# Patient Record
Sex: FEMALE | Race: WHITE | Employment: OTHER | URBAN - NONMETROPOLITAN AREA
[De-identification: names, ages, dates, MRNs, and addresses within clinical notes are randomized per-mention and may not be internally consistent; named-entity substitution may affect disease eponyms.]

---

## 2017-03-09 ENCOUNTER — TELEPHONE (OUTPATIENT)
Dept: FAMILY MEDICINE CLINIC | Age: 67
End: 2017-03-09

## 2017-03-20 ENCOUNTER — TELEPHONE (OUTPATIENT)
Dept: FAMILY MEDICINE CLINIC | Age: 67
End: 2017-03-20

## 2017-03-20 DIAGNOSIS — M75.121 COMPLETE TEAR OF RIGHT ROTATOR CUFF: Primary | ICD-10-CM

## 2017-04-26 ENCOUNTER — NURSE ONLY (OUTPATIENT)
Dept: FAMILY MEDICINE CLINIC | Age: 67
End: 2017-04-26

## 2017-04-26 DIAGNOSIS — R30.0 DYSURIA: Primary | ICD-10-CM

## 2017-04-26 DIAGNOSIS — R35.0 URINARY FREQUENCY: ICD-10-CM

## 2017-04-26 LAB
BILIRUBIN, POC: NEGATIVE
BLOOD URINE, POC: NEGATIVE
CLARITY, POC: CLEAR
COLOR, POC: YELLOW
GLUCOSE URINE, POC: NEGATIVE
KETONES, POC: NEGATIVE
LEUKOCYTE EST, POC: NEGATIVE
NITRITE, POC: NEGATIVE
PH, POC: 5.5
PROTEIN, POC: NEGATIVE
SPECIFIC GRAVITY, POC: 1.03
UROBILINOGEN, POC: 0.2

## 2017-04-26 PROCEDURE — 81003 URINALYSIS AUTO W/O SCOPE: CPT | Performed by: NURSE PRACTITIONER

## 2017-05-11 ENCOUNTER — OFFICE VISIT (OUTPATIENT)
Dept: ONCOLOGY | Age: 67
End: 2017-05-11

## 2017-05-11 VITALS
HEIGHT: 63 IN | WEIGHT: 155 LBS | SYSTOLIC BLOOD PRESSURE: 142 MMHG | RESPIRATION RATE: 18 BRPM | BODY MASS INDEX: 27.46 KG/M2 | TEMPERATURE: 97.7 F | OXYGEN SATURATION: 97 % | DIASTOLIC BLOOD PRESSURE: 71 MMHG | HEART RATE: 73 BPM

## 2017-05-11 DIAGNOSIS — C50.912 BREAST CANCER METASTASIZED TO AXILLARY LYMPH NODE, LEFT (HCC): ICD-10-CM

## 2017-05-11 DIAGNOSIS — C77.3 BREAST CANCER METASTASIZED TO AXILLARY LYMPH NODE, LEFT (HCC): ICD-10-CM

## 2017-05-11 PROCEDURE — G8427 DOCREV CUR MEDS BY ELIG CLIN: HCPCS | Performed by: INTERNAL MEDICINE

## 2017-05-11 PROCEDURE — 99214 OFFICE O/P EST MOD 30 MIN: CPT | Performed by: INTERNAL MEDICINE

## 2017-05-11 PROCEDURE — 3014F SCREEN MAMMO DOC REV: CPT | Performed by: INTERNAL MEDICINE

## 2017-05-11 PROCEDURE — 3017F COLORECTAL CA SCREEN DOC REV: CPT | Performed by: INTERNAL MEDICINE

## 2017-05-11 PROCEDURE — G8420 CALC BMI NORM PARAMETERS: HCPCS | Performed by: INTERNAL MEDICINE

## 2017-05-11 PROCEDURE — 4040F PNEUMOC VAC/ADMIN/RCVD: CPT | Performed by: INTERNAL MEDICINE

## 2017-05-11 PROCEDURE — 1123F ACP DISCUSS/DSCN MKR DOCD: CPT | Performed by: INTERNAL MEDICINE

## 2017-05-11 PROCEDURE — 1036F TOBACCO NON-USER: CPT | Performed by: INTERNAL MEDICINE

## 2017-05-11 PROCEDURE — 1090F PRES/ABSN URINE INCON ASSESS: CPT | Performed by: INTERNAL MEDICINE

## 2017-05-11 PROCEDURE — G8400 PT W/DXA NO RESULTS DOC: HCPCS | Performed by: INTERNAL MEDICINE

## 2017-05-11 RX ORDER — ANASTROZOLE 1 MG/1
TABLET ORAL
Qty: 90 TABLET | Refills: 3 | Status: SHIPPED | OUTPATIENT
Start: 2017-05-11 | End: 2018-07-16 | Stop reason: SDUPTHER

## 2017-06-12 ENCOUNTER — OFFICE VISIT (OUTPATIENT)
Dept: CARDIOLOGY | Age: 67
End: 2017-06-12

## 2017-06-12 VITALS
HEIGHT: 63 IN | SYSTOLIC BLOOD PRESSURE: 144 MMHG | BODY MASS INDEX: 27 KG/M2 | DIASTOLIC BLOOD PRESSURE: 60 MMHG | WEIGHT: 152.4 LBS | HEART RATE: 71 BPM

## 2017-06-12 DIAGNOSIS — R06.02 SOB (SHORTNESS OF BREATH) ON EXERTION: ICD-10-CM

## 2017-06-12 DIAGNOSIS — I48.0 PAF (PAROXYSMAL ATRIAL FIBRILLATION) (HCC): ICD-10-CM

## 2017-06-12 DIAGNOSIS — I31.39 PERICARDIAL EFFUSION: ICD-10-CM

## 2017-06-12 DIAGNOSIS — R94.31 ABNORMAL EKG: ICD-10-CM

## 2017-06-12 DIAGNOSIS — Z01.818 PRE-OP EVALUATION: Primary | ICD-10-CM

## 2017-06-12 DIAGNOSIS — R60.0 BILATERAL LEG EDEMA: ICD-10-CM

## 2017-06-12 PROCEDURE — G8427 DOCREV CUR MEDS BY ELIG CLIN: HCPCS | Performed by: INTERNAL MEDICINE

## 2017-06-12 PROCEDURE — 1123F ACP DISCUSS/DSCN MKR DOCD: CPT | Performed by: INTERNAL MEDICINE

## 2017-06-12 PROCEDURE — 1090F PRES/ABSN URINE INCON ASSESS: CPT | Performed by: INTERNAL MEDICINE

## 2017-06-12 PROCEDURE — 3017F COLORECTAL CA SCREEN DOC REV: CPT | Performed by: INTERNAL MEDICINE

## 2017-06-12 PROCEDURE — G8419 CALC BMI OUT NRM PARAM NOF/U: HCPCS | Performed by: INTERNAL MEDICINE

## 2017-06-12 PROCEDURE — 3014F SCREEN MAMMO DOC REV: CPT | Performed by: INTERNAL MEDICINE

## 2017-06-12 PROCEDURE — 93000 ELECTROCARDIOGRAM COMPLETE: CPT | Performed by: INTERNAL MEDICINE

## 2017-06-12 PROCEDURE — 99214 OFFICE O/P EST MOD 30 MIN: CPT | Performed by: INTERNAL MEDICINE

## 2017-06-12 PROCEDURE — 4040F PNEUMOC VAC/ADMIN/RCVD: CPT | Performed by: INTERNAL MEDICINE

## 2017-06-12 PROCEDURE — 1036F TOBACCO NON-USER: CPT | Performed by: INTERNAL MEDICINE

## 2017-06-12 PROCEDURE — G8400 PT W/DXA NO RESULTS DOC: HCPCS | Performed by: INTERNAL MEDICINE

## 2017-06-13 RX ORDER — DILTIAZEM HYDROCHLORIDE 120 MG/1
120 CAPSULE, EXTENDED RELEASE ORAL DAILY
Qty: 180 CAPSULE | Refills: 1 | Status: SHIPPED | OUTPATIENT
Start: 2017-06-13 | End: 2017-06-13 | Stop reason: SDUPTHER

## 2017-06-13 RX ORDER — FLECAINIDE ACETATE 50 MG/1
TABLET ORAL
Qty: 180 TABLET | Refills: 1 | Status: SHIPPED | OUTPATIENT
Start: 2017-06-13 | End: 2017-06-13 | Stop reason: SDUPTHER

## 2017-06-21 RX ORDER — FLECAINIDE ACETATE 50 MG/1
TABLET ORAL
Qty: 180 TABLET | Refills: 1 | Status: SHIPPED | OUTPATIENT
Start: 2017-06-21 | End: 2017-12-24 | Stop reason: SDUPTHER

## 2017-06-21 RX ORDER — DILTIAZEM HYDROCHLORIDE 120 MG/1
120 CAPSULE, EXTENDED RELEASE ORAL DAILY
Qty: 180 CAPSULE | Refills: 1 | Status: SHIPPED | OUTPATIENT
Start: 2017-06-21 | End: 2018-06-11 | Stop reason: SDUPTHER

## 2017-06-27 ENCOUNTER — TELEPHONE (OUTPATIENT)
Dept: CARDIOLOGY | Age: 67
End: 2017-06-27

## 2017-07-03 ENCOUNTER — OFFICE VISIT (OUTPATIENT)
Dept: CARDIOLOGY | Age: 67
End: 2017-07-03

## 2017-07-03 VITALS
BODY MASS INDEX: 27.29 KG/M2 | DIASTOLIC BLOOD PRESSURE: 64 MMHG | HEART RATE: 84 BPM | HEIGHT: 63 IN | WEIGHT: 154 LBS | SYSTOLIC BLOOD PRESSURE: 130 MMHG

## 2017-07-03 DIAGNOSIS — I48.0 PAF (PAROXYSMAL ATRIAL FIBRILLATION) (HCC): ICD-10-CM

## 2017-07-03 DIAGNOSIS — R94.31 ABNORMAL EKG: ICD-10-CM

## 2017-07-03 DIAGNOSIS — R06.02 SOB (SHORTNESS OF BREATH) ON EXERTION: Primary | ICD-10-CM

## 2017-07-03 DIAGNOSIS — R00.2 INTERMITTENT PALPITATIONS: ICD-10-CM

## 2017-07-03 DIAGNOSIS — R53.1 GENERALIZED WEAKNESS: ICD-10-CM

## 2017-07-03 DIAGNOSIS — Z01.818 PRE-OP EVALUATION: ICD-10-CM

## 2017-07-03 PROCEDURE — 3017F COLORECTAL CA SCREEN DOC REV: CPT | Performed by: INTERNAL MEDICINE

## 2017-07-03 PROCEDURE — 99213 OFFICE O/P EST LOW 20 MIN: CPT | Performed by: INTERNAL MEDICINE

## 2017-07-03 PROCEDURE — 1036F TOBACCO NON-USER: CPT | Performed by: INTERNAL MEDICINE

## 2017-07-03 PROCEDURE — G8400 PT W/DXA NO RESULTS DOC: HCPCS | Performed by: INTERNAL MEDICINE

## 2017-07-03 PROCEDURE — 3014F SCREEN MAMMO DOC REV: CPT | Performed by: INTERNAL MEDICINE

## 2017-07-03 PROCEDURE — 1123F ACP DISCUSS/DSCN MKR DOCD: CPT | Performed by: INTERNAL MEDICINE

## 2017-07-03 PROCEDURE — G8427 DOCREV CUR MEDS BY ELIG CLIN: HCPCS | Performed by: INTERNAL MEDICINE

## 2017-07-03 PROCEDURE — G8419 CALC BMI OUT NRM PARAM NOF/U: HCPCS | Performed by: INTERNAL MEDICINE

## 2017-07-03 PROCEDURE — 4040F PNEUMOC VAC/ADMIN/RCVD: CPT | Performed by: INTERNAL MEDICINE

## 2017-07-03 PROCEDURE — 1090F PRES/ABSN URINE INCON ASSESS: CPT | Performed by: INTERNAL MEDICINE

## 2017-07-14 RX ORDER — SODIUM CHLORIDE 0.9 % (FLUSH) 0.9 %
10 SYRINGE (ML) INJECTION PRN
Status: CANCELLED | OUTPATIENT
Start: 2017-07-14

## 2017-07-14 RX ORDER — SODIUM CHLORIDE 9 MG/ML
INJECTION, SOLUTION INTRAVENOUS CONTINUOUS
Status: CANCELLED | OUTPATIENT
Start: 2017-07-14

## 2017-07-14 RX ORDER — ALPRAZOLAM 0.5 MG/1
0.5 TABLET ORAL
Status: CANCELLED | OUTPATIENT
Start: 2017-07-14 | End: 2017-07-14

## 2017-07-14 RX ORDER — SODIUM CHLORIDE 0.9 % (FLUSH) 0.9 %
10 SYRINGE (ML) INJECTION EVERY 12 HOURS SCHEDULED
Status: CANCELLED | OUTPATIENT
Start: 2017-07-14

## 2017-07-17 RX ORDER — SODIUM CHLORIDE 9 MG/ML
INJECTION, SOLUTION INTRAVENOUS CONTINUOUS
Status: CANCELLED | OUTPATIENT
Start: 2017-07-17

## 2017-07-17 RX ORDER — ALPRAZOLAM 0.5 MG/1
0.5 TABLET ORAL
Status: CANCELLED | OUTPATIENT
Start: 2017-07-17 | End: 2017-07-17

## 2017-07-17 RX ORDER — SODIUM CHLORIDE 0.9 % (FLUSH) 0.9 %
10 SYRINGE (ML) INJECTION EVERY 12 HOURS SCHEDULED
Status: CANCELLED | OUTPATIENT
Start: 2017-07-17

## 2017-07-17 RX ORDER — SODIUM CHLORIDE 0.9 % (FLUSH) 0.9 %
10 SYRINGE (ML) INJECTION PRN
Status: CANCELLED | OUTPATIENT
Start: 2017-07-17

## 2017-07-18 ENCOUNTER — HOSPITAL ENCOUNTER (OUTPATIENT)
Dept: CARDIAC CATH/INVASIVE PROCEDURES | Age: 67
Discharge: HOME | End: 2017-07-18
Payer: MEDICARE

## 2017-07-18 ENCOUNTER — APPOINTMENT (OUTPATIENT)
Dept: INPATIENT UNIT | Age: 67
End: 2017-07-18
Payer: MEDICARE

## 2017-07-18 ENCOUNTER — HOSPITAL ENCOUNTER (OUTPATIENT)
Dept: INPATIENT UNIT | Age: 67
Discharge: HOME | End: 2017-07-18
Payer: MEDICARE

## 2017-07-18 VITALS
HEART RATE: 72 BPM | WEIGHT: 154 LBS | OXYGEN SATURATION: 96 % | HEIGHT: 63 IN | DIASTOLIC BLOOD PRESSURE: 55 MMHG | BODY MASS INDEX: 27.29 KG/M2 | SYSTOLIC BLOOD PRESSURE: 140 MMHG | TEMPERATURE: 97.7 F | RESPIRATION RATE: 20 BRPM

## 2017-07-18 DIAGNOSIS — Z98.890 S/P CARDIAC CATH: ICD-10-CM

## 2017-07-18 LAB
ANION GAP SERPL CALCULATED.3IONS-SCNC: 10 MEQ/L (ref 8–16)
BUN BLDV-MCNC: 14 MG/DL (ref 7–22)
CALCIUM SERPL-MCNC: 9.4 MG/DL (ref 8.5–10.5)
CHLORIDE BLD-SCNC: 103 MEQ/L (ref 98–111)
CO2: 28 MEQ/L (ref 23–33)
CREAT SERPL-MCNC: 0.6 MG/DL (ref 0.4–1.2)
GFR SERPL CREATININE-BSD FRML MDRD: > 90 ML/MIN/1.73M2
GLUCOSE BLD-MCNC: 86 MG/DL (ref 70–108)
HCT VFR BLD CALC: 38.1 % (ref 37–47)
HEMOGLOBIN: 13.2 GM/DL (ref 12–16)
MCH RBC QN AUTO: 30.9 PG (ref 27–31)
MCHC RBC AUTO-ENTMCNC: 34.5 GM/DL (ref 33–37)
MCV RBC AUTO: 89.5 FL (ref 81–99)
PDW BLD-RTO: 14 % (ref 11.5–14.5)
PLATELET # BLD: 236 THOU/MM3 (ref 130–400)
PMV BLD AUTO: 8.3 MCM (ref 7.4–10.4)
POTASSIUM SERPL-SCNC: 4.2 MEQ/L (ref 3.5–5.2)
RBC # BLD: 4.26 MILL/MM3 (ref 4.2–5.4)
SODIUM BLD-SCNC: 141 MEQ/L (ref 135–145)
WBC # BLD: 6.9 THOU/MM3 (ref 4.8–10.8)

## 2017-07-18 PROCEDURE — C1894 INTRO/SHEATH, NON-LASER: HCPCS

## 2017-07-18 PROCEDURE — 93458 L HRT ARTERY/VENTRICLE ANGIO: CPT | Performed by: INTERNAL MEDICINE

## 2017-07-18 PROCEDURE — C1725 CATH, TRANSLUMIN NON-LASER: HCPCS

## 2017-07-18 PROCEDURE — 36415 COLL VENOUS BLD VENIPUNCTURE: CPT

## 2017-07-18 PROCEDURE — 2500000003 HC RX 250 WO HCPCS

## 2017-07-18 PROCEDURE — 2780000010 HC IMPLANT OTHER

## 2017-07-18 PROCEDURE — 6360000002 HC RX W HCPCS

## 2017-07-18 PROCEDURE — C1769 GUIDE WIRE: HCPCS

## 2017-07-18 PROCEDURE — 80048 BASIC METABOLIC PNL TOTAL CA: CPT

## 2017-07-18 PROCEDURE — 2580000003 HC RX 258: Performed by: PHYSICIAN ASSISTANT

## 2017-07-18 PROCEDURE — 85027 COMPLETE CBC AUTOMATED: CPT

## 2017-07-18 PROCEDURE — 93005 ELECTROCARDIOGRAM TRACING: CPT

## 2017-07-18 RX ORDER — ACETAMINOPHEN 325 MG/1
650 TABLET ORAL EVERY 4 HOURS PRN
Status: CANCELLED | OUTPATIENT
Start: 2017-07-18

## 2017-07-18 RX ORDER — ACETAMINOPHEN 325 MG/1
650 TABLET ORAL EVERY 4 HOURS PRN
Status: DISCONTINUED | OUTPATIENT
Start: 2017-07-18 | End: 2017-07-19 | Stop reason: HOSPADM

## 2017-07-18 RX ORDER — SODIUM CHLORIDE 9 MG/ML
INJECTION, SOLUTION INTRAVENOUS CONTINUOUS
Status: DISCONTINUED | OUTPATIENT
Start: 2017-07-18 | End: 2017-07-19 | Stop reason: HOSPADM

## 2017-07-18 RX ORDER — SODIUM CHLORIDE 0.9 % (FLUSH) 0.9 %
10 SYRINGE (ML) INJECTION EVERY 12 HOURS SCHEDULED
Status: DISCONTINUED | OUTPATIENT
Start: 2017-07-18 | End: 2017-07-18 | Stop reason: SDUPTHER

## 2017-07-18 RX ORDER — SODIUM CHLORIDE 0.9 % (FLUSH) 0.9 %
10 SYRINGE (ML) INJECTION PRN
Status: CANCELLED | OUTPATIENT
Start: 2017-07-18

## 2017-07-18 RX ORDER — ONDANSETRON 2 MG/ML
4 INJECTION INTRAMUSCULAR; INTRAVENOUS EVERY 6 HOURS PRN
Status: DISCONTINUED | OUTPATIENT
Start: 2017-07-18 | End: 2017-07-19 | Stop reason: HOSPADM

## 2017-07-18 RX ORDER — ALPRAZOLAM 0.5 MG/1
0.5 TABLET ORAL
Status: ACTIVE | OUTPATIENT
Start: 2017-07-18 | End: 2017-07-18

## 2017-07-18 RX ORDER — SODIUM CHLORIDE 0.9 % (FLUSH) 0.9 %
10 SYRINGE (ML) INJECTION PRN
Status: DISCONTINUED | OUTPATIENT
Start: 2017-07-18 | End: 2017-07-18 | Stop reason: SDUPTHER

## 2017-07-18 RX ORDER — ONDANSETRON 2 MG/ML
4 INJECTION INTRAMUSCULAR; INTRAVENOUS EVERY 6 HOURS PRN
Status: CANCELLED | OUTPATIENT
Start: 2017-07-18

## 2017-07-18 RX ORDER — SODIUM CHLORIDE 0.9 % (FLUSH) 0.9 %
10 SYRINGE (ML) INJECTION EVERY 12 HOURS SCHEDULED
Status: CANCELLED | OUTPATIENT
Start: 2017-07-18

## 2017-07-18 RX ORDER — SODIUM CHLORIDE 0.9 % (FLUSH) 0.9 %
10 SYRINGE (ML) INJECTION EVERY 12 HOURS SCHEDULED
Status: DISCONTINUED | OUTPATIENT
Start: 2017-07-18 | End: 2017-07-19 | Stop reason: HOSPADM

## 2017-07-18 RX ADMIN — SODIUM CHLORIDE: 9 INJECTION, SOLUTION INTRAVENOUS at 14:22

## 2017-07-19 LAB
EKG ATRIAL RATE: 66 BPM
EKG P AXIS: 39 DEGREES
EKG P-R INTERVAL: 190 MS
EKG Q-T INTERVAL: 414 MS
EKG QRS DURATION: 86 MS
EKG QTC CALCULATION (BAZETT): 434 MS
EKG R AXIS: 71 DEGREES
EKG T AXIS: 73 DEGREES
EKG VENTRICULAR RATE: 66 BPM

## 2017-07-26 ENCOUNTER — TELEPHONE (OUTPATIENT)
Dept: CARDIOLOGY CLINIC | Age: 67
End: 2017-07-26

## 2017-08-28 ENCOUNTER — OFFICE VISIT (OUTPATIENT)
Dept: CARDIOLOGY CLINIC | Age: 67
End: 2017-08-28
Payer: MEDICARE

## 2017-08-28 VITALS
DIASTOLIC BLOOD PRESSURE: 82 MMHG | SYSTOLIC BLOOD PRESSURE: 137 MMHG | BODY MASS INDEX: 26.72 KG/M2 | WEIGHT: 150.8 LBS | HEIGHT: 63 IN | HEART RATE: 78 BPM

## 2017-08-28 DIAGNOSIS — Z98.890 S/P CARDIAC CATH: Primary | ICD-10-CM

## 2017-08-28 DIAGNOSIS — R00.2 INTERMITTENT PALPITATIONS: ICD-10-CM

## 2017-08-28 DIAGNOSIS — I48.0 PAF (PAROXYSMAL ATRIAL FIBRILLATION) (HCC): ICD-10-CM

## 2017-08-28 PROCEDURE — 1123F ACP DISCUSS/DSCN MKR DOCD: CPT | Performed by: INTERNAL MEDICINE

## 2017-08-28 PROCEDURE — 1090F PRES/ABSN URINE INCON ASSESS: CPT | Performed by: INTERNAL MEDICINE

## 2017-08-28 PROCEDURE — G8419 CALC BMI OUT NRM PARAM NOF/U: HCPCS | Performed by: INTERNAL MEDICINE

## 2017-08-28 PROCEDURE — 99213 OFFICE O/P EST LOW 20 MIN: CPT | Performed by: INTERNAL MEDICINE

## 2017-08-28 PROCEDURE — 1036F TOBACCO NON-USER: CPT | Performed by: INTERNAL MEDICINE

## 2017-08-28 PROCEDURE — G8428 CUR MEDS NOT DOCUMENT: HCPCS | Performed by: INTERNAL MEDICINE

## 2017-08-28 PROCEDURE — 3017F COLORECTAL CA SCREEN DOC REV: CPT | Performed by: INTERNAL MEDICINE

## 2017-08-28 PROCEDURE — 4040F PNEUMOC VAC/ADMIN/RCVD: CPT | Performed by: INTERNAL MEDICINE

## 2017-08-28 PROCEDURE — 3014F SCREEN MAMMO DOC REV: CPT | Performed by: INTERNAL MEDICINE

## 2017-08-28 PROCEDURE — G8400 PT W/DXA NO RESULTS DOC: HCPCS | Performed by: INTERNAL MEDICINE

## 2017-08-28 RX ORDER — OXYCODONE HYDROCHLORIDE AND ACETAMINOPHEN 5; 325 MG/1; MG/1
1 TABLET ORAL EVERY 4 HOURS PRN
COMMUNITY
End: 2017-10-02

## 2017-09-26 ENCOUNTER — TELEPHONE (OUTPATIENT)
Dept: ADMINISTRATIVE | Age: 67
End: 2017-09-26

## 2017-10-02 ENCOUNTER — OFFICE VISIT (OUTPATIENT)
Dept: SURGERY | Age: 67
End: 2017-10-02
Payer: MEDICARE

## 2017-10-02 VITALS
WEIGHT: 153.4 LBS | DIASTOLIC BLOOD PRESSURE: 64 MMHG | HEART RATE: 100 BPM | OXYGEN SATURATION: 96 % | HEIGHT: 63 IN | BODY MASS INDEX: 27.18 KG/M2 | RESPIRATION RATE: 18 BRPM | SYSTOLIC BLOOD PRESSURE: 120 MMHG | TEMPERATURE: 97.6 F

## 2017-10-02 DIAGNOSIS — C50.512 MALIGNANT NEOPLASM OF LOWER-OUTER QUADRANT OF LEFT FEMALE BREAST (HCC): Primary | ICD-10-CM

## 2017-10-02 DIAGNOSIS — M85.80 OSTEOPENIA, UNSPECIFIED LOCATION: ICD-10-CM

## 2017-10-02 PROCEDURE — G8484 FLU IMMUNIZE NO ADMIN: HCPCS | Performed by: SURGERY

## 2017-10-02 PROCEDURE — 4040F PNEUMOC VAC/ADMIN/RCVD: CPT | Performed by: SURGERY

## 2017-10-02 PROCEDURE — G8427 DOCREV CUR MEDS BY ELIG CLIN: HCPCS | Performed by: SURGERY

## 2017-10-02 PROCEDURE — 1090F PRES/ABSN URINE INCON ASSESS: CPT | Performed by: SURGERY

## 2017-10-02 PROCEDURE — 99214 OFFICE O/P EST MOD 30 MIN: CPT | Performed by: SURGERY

## 2017-10-02 PROCEDURE — 3014F SCREEN MAMMO DOC REV: CPT | Performed by: SURGERY

## 2017-10-02 PROCEDURE — G8400 PT W/DXA NO RESULTS DOC: HCPCS | Performed by: SURGERY

## 2017-10-02 PROCEDURE — 1123F ACP DISCUSS/DSCN MKR DOCD: CPT | Performed by: SURGERY

## 2017-10-02 PROCEDURE — 1036F TOBACCO NON-USER: CPT | Performed by: SURGERY

## 2017-10-02 PROCEDURE — 3017F COLORECTAL CA SCREEN DOC REV: CPT | Performed by: SURGERY

## 2017-10-02 PROCEDURE — G8417 CALC BMI ABV UP PARAM F/U: HCPCS | Performed by: SURGERY

## 2017-10-02 ASSESSMENT — ENCOUNTER SYMPTOMS
ABDOMINAL PAIN: 0
COLOR CHANGE: 0
COUGH: 0
ABDOMINAL DISTENTION: 0
BLOOD IN STOOL: 0
TROUBLE SWALLOWING: 0
CONSTIPATION: 0
NAUSEA: 0

## 2017-10-02 NOTE — PROGRESS NOTES
Lucia Polk MD   General Surgery  Follow-up Patient Evaluation in Office  Pt Name: Katerine Scott  Date of Birth 1950   Today's Date: 10/2/2017  Medical Record Number: [id number]  Referring Provider: No ref. provider found  Primary Care Provider: Devan Aguiar NP  Chief Complaint:  Chief Complaint   Patient presents with    1 Year Follow Up     left breast cancer-mammogram 4/17/17       ASSESSMENT      1. Malignant neoplasm of lower-outer quadrant of left female breast ([address]    2. Osteopenia, unspecified location         PLANS      1. Continue Arimidex 5 years  2. Medical oncology follow-up with Dr. Debora Cid  3. Encourage monthly self breast examinations and yearly breast imaging  4. Breast survivorship plan discussed with patient  5. Follow-up surgical clinic in 1 year  6. Continue calcium and vitamin D supplementation      Bhumika Xiong is a 78y.o. year old female who is presenting today in the office for Follow-up evaluation of left breast cancer. Mario Hooper presented in 2014 with some thickening in the upper outer quadrant of the left breast.  On mammography she had extremely dense breasts. She had a cluster of fine microcalcifications left breast 1:00 position posterior depth. Ultrasound revealed an associated mass. An ultrasound-guided biopsy also showed a potentially enlarged lymph node in the axilla. Ultrasound-guided biopsies revealed invasive ductal carcinoma nuclear grade 2 with metastatic deposit within the left axillary lymph node. Tumor was estrogen receptor +100%, UT +90% and HER-2 nonamplified. Mario Hooper saw Dr Debora Cid and underwent neoadjuvant chemotherapy. Staging studies at that time did not reveal conclusive evidence of distant metastatic disease. She underwent 4 cycles of Adriamycin/Cytoxan followed by 12 Taxol treatments. She then underwent a left partial mastectomy and left axillary lymph node dissection on 6/26/2014. She completed external beam radiation therapy.   She has been on oral Arimidex therapy. She complains of hot flashes and mild arthralgias. She has some right shoulder pain she had recent rotator cuff repair. She has no breast pain or nipple discharge. She denies any vaginal bleeding. Mammography in April of this year showed no suspicious findings. Recent mammography in April showed benign findings. She has had a cardiac catheterization and a colonoscopy since her last visit with me. G4, P3. She had her children in her 25s. She was on brief estrogen replacement therapy remotely. Mother history of brain malignancy, no known family history of breast, ovarian or GI malignancy. Past Medical History  Past Medical History:   Diagnosis Date    Cancer [facility]) 2013    Breast LEFT       Past Surgical History  Past Surgical History:   Procedure Laterality Date    BREAST LUMPECTOMY Left 06/26/2014    Needle Loc x2 with Axillary node dissection    BUNIONECTOMY      CARPAL TUNNEL RELEASE Right 11/28/2016    COLONOSCOPY  12/02/2016    ROTATOR CUFF REPAIR Right 08/03/2017        TONSILLECTOMY AND ADENOIDECTOMY Bilateral        Medications  Current Outpatient Prescriptions   Medication Sig Dispense Refill    diltiazem (CARDIZEM 12 HR) 120 MG extended release capsule Take 1 capsule by mouth daily 180 capsule 1    flecainide (TAMBOCOR) 50 MG tablet TAKE 1 TABLET TWICE A  tablet 1    anastrozole (ARIMIDEX) 1 MG tablet take 1 tablet by mouth once daily 90 tablet 3    calcium carbonate (OSCAL) 500 MG TABS tablet Take 500 mg by mouth 2 times daily      Multiple Vitamins-Minerals (MULTIVITAMIN PO) Take 1 tablet by mouth daily       aspirin 325 MG EC tablet Take 1 tablet by mouth daily. 30 tablet 3    acetaminophen 650 MG TABS Take 650 mg by mouth every 4 hours as needed. 120 tablet 3     No current facility-administered medications for this visit.       Allergies  No Known Allergies    Family History  Family History   Problem Relation Age of

## 2017-10-02 NOTE — MR AVS SNAPSHOT
After Visit Summary             Jaylene Solomon   10/2/2017 1:15 PM   Office Visit    Description:  Female : 1950   Provider:  Elvira Smith MD   Department:  [facility]              Your Follow-Up and Future Appointments         Below is a list of your follow-up and future appointments. This may not be a complete list as you may have made appointments directly with providers that we are not aware of or your providers may have made some for you. Please call your providers to confirm appointments. It is important to keep your appointments. Please bring your current insurance card, photo ID, co-pay, and all medication bottles to your appointment. If self-pay, payment is expected at the time of service. Your To-Do List     Future Appointments Provider Department Dept Phone    2017 10:30 AM Chuck Koyanagi, MD [facility] [phone]    Please arrive 15 minutes prior to appointment, bring photo ID and insurance card. Please arrive 15 minutes prior to appointment, bring photo ID and insurance card. 3/5/2018 8:30 AM Thuy Maloney MD [facility] [phone]    Please arrive 15 minutes prior to appointment, bring photo ID and insurance card. 10/8/2018 1:00 PM Elvira Smith MD [facility] [phone]    Please arrive 15 minutes prior to appointment, bring photo ID and insurance card. Please arrive 15 minutes prior to appointment, bring photo ID and insurance card. Follow-Up    Return in about 1 year (around 10/2/2018). Information from Your Visit        Department     Name Address Phone Fax    [facility]. [address] [place] [address] [phone]      Vital Signs     Blood Pressure Pulse Temperature Respirations Height Weight    120/64 (Site: Right Arm, Position: Sitting, Cuff Size: Medium Adult) 100 97.6 °F (36.4 °C) (Tympanic) 18 5' 2.99\" (1.6 m) 153 lb 6.4 oz (69.6 kg)    Oxygen Saturation Body Mass Index Smoking Status             96% 27.18 kg/m2 Former Smoker         Additional Information about your Body Mass Index (BMI)           Your BMI as listed above is considered overweight (25.0-29.9). BMI is an estimate of body fat, calculated from your height and weight. The higher your BMI, the greater your risk of heart disease, high blood pressure, type 2 diabetes, stroke, gallstones, arthritis, sleep apnea, and certain cancers. BMI is not perfect. It may overestimate body fat in athletes and people who are more muscular. If your body fat is high you can improve your BMI by decreasing your calorie intake and becoming more physically active. Learn more at: [employer]             Today's Medication Changes          These changes are accurate as of: 10/2/17  1:47 PM.  If you have any questions, ask your nurse or doctor. STOP taking these medications           oxyCODONE-acetaminophen 5-325 MG per tablet   Commonly known as:  PERCOCET   Stopped by:  Dayanara Galvez MD               Your Current Medications Are              diltiazem (CARDIZEM 12 HR) 120 MG extended release capsule Take 1 capsule by mouth daily    flecainide (TAMBOCOR) 50 MG tablet TAKE 1 TABLET TWICE A DAY    anastrozole (ARIMIDEX) 1 MG tablet take 1 tablet by mouth once daily    calcium carbonate (OSCAL) 500 MG TABS tablet Take 500 mg by mouth 2 times daily    Multiple Vitamins-Minerals (MULTIVITAMIN PO) Take 1 tablet by mouth daily     aspirin 325 MG EC tablet Take 1 tablet by mouth daily. acetaminophen 650 MG TABS Take 650 mg by mouth every 4 hours as needed.       Allergies           No Known Allergies         Additional Information        Basic Information     Date Of Birth Sex Race Ethnicity Preferred Language    1950 Female White Non-/Non  [place]

## 2017-11-16 ENCOUNTER — OFFICE VISIT (OUTPATIENT)
Dept: ONCOLOGY | Age: 67
End: 2017-11-16
Payer: MEDICARE

## 2017-11-16 ENCOUNTER — HOSPITAL ENCOUNTER (OUTPATIENT)
Dept: INFUSION THERAPY | Age: 67
Discharge: HOME | End: 2017-11-16
Payer: MEDICARE

## 2017-11-16 VITALS
BODY MASS INDEX: 27.32 KG/M2 | RESPIRATION RATE: 18 BRPM | WEIGHT: 154.2 LBS | TEMPERATURE: 97.1 F | HEART RATE: 83 BPM | DIASTOLIC BLOOD PRESSURE: 67 MMHG | SYSTOLIC BLOOD PRESSURE: 138 MMHG | HEIGHT: 63 IN | OXYGEN SATURATION: 98 %

## 2017-11-16 DIAGNOSIS — C77.3 BREAST CANCER METASTASIZED TO AXILLARY LYMPH NODE, LEFT (HCC): ICD-10-CM

## 2017-11-16 DIAGNOSIS — Z85.3 HISTORY OF BREAST CANCER: ICD-10-CM

## 2017-11-16 DIAGNOSIS — Z79.811 USE OF ANASTROZOLE (ARIMIDEX): ICD-10-CM

## 2017-11-16 DIAGNOSIS — C77.3 CARCINOMA OF LEFT BREAST METASTATIC TO AXILLARY LYMPH NODE (HCC): Primary | ICD-10-CM

## 2017-11-16 DIAGNOSIS — C50.912 CARCINOMA OF LEFT BREAST METASTATIC TO AXILLARY LYMPH NODE (HCC): Primary | ICD-10-CM

## 2017-11-16 DIAGNOSIS — Z12.31 ENCOUNTER FOR SCREENING MAMMOGRAM FOR MALIGNANT NEOPLASM OF BREAST: ICD-10-CM

## 2017-11-16 DIAGNOSIS — C50.912 BREAST CANCER METASTASIZED TO AXILLARY LYMPH NODE, LEFT (HCC): ICD-10-CM

## 2017-11-16 LAB
ALBUMIN SERPL-MCNC: 4.1 G/DL (ref 3.5–5.1)
ALP BLD-CCNC: 108 U/L (ref 38–126)
ALT SERPL-CCNC: 16 U/L (ref 11–66)
AST SERPL-CCNC: 18 U/L (ref 5–40)
BASINOPHIL, AUTOMATED: 1 % (ref 0–12)
BILIRUB SERPL-MCNC: < 0.2 MG/DL (ref 0.3–1.2)
BILIRUBIN DIRECT: < 0.2 MG/DL (ref 0–0.3)
BUN, WHOLE BLOOD: 13 MG/DL (ref 8–26)
CHLORIDE, WHOLE BLOOD: 99 MEQ/L (ref 98–109)
CREATININE, WHOLE BLOOD: 0.8 MG/DL (ref 0.5–1.2)
EOSINOPHILS RELATIVE PERCENT: 4 % (ref 0–12)
GFR, ESTIMATED: 76 ML/MIN/1.73M2
GLUCOSE, WHOLE BLOOD: 94 MG/DL (ref 70–108)
HCT VFR BLD CALC: 40.3 % (ref 37–47)
HEMOGLOBIN: 13.5 GM/DL (ref 12–16)
IONIZED CALCIUM, WHOLE BLOOD: 1.31 MMOL/L (ref 1.12–1.32)
LYMPHOCYTES # BLD: 31 % (ref 15–47)
MCH RBC QN AUTO: 30.2 PG (ref 27–31)
MCHC RBC AUTO-ENTMCNC: 33.6 GM/DL (ref 33–37)
MCV RBC AUTO: 90 FL (ref 81–99)
MONOCYTES: 6 % (ref 0–12)
PDW BLD-RTO: 11.5 % (ref 11.5–14.5)
PLATELET # BLD: 274 THOU/MM3 (ref 130–400)
PMV BLD AUTO: 7.2 MCM (ref 7.4–10.4)
POTASSIUM, WHOLE BLOOD: 4.4 MEQ/L (ref 3.5–4.9)
RBC # BLD: 4.48 MILL/MM3 (ref 4.2–5.4)
SEG NEUTROPHILS: 58 % (ref 43–75)
SODIUM, WHOLE BLOOD: 141 MEQ/L (ref 138–146)
TOTAL CO2, WHOLE BLOOD: 30 MEQ/L (ref 23–33)
TOTAL PROTEIN: 6.9 G/DL (ref 6.1–8)
WBC # BLD: 7.5 THOU/MM3 (ref 4.8–10.8)

## 2017-11-16 PROCEDURE — 1036F TOBACCO NON-USER: CPT | Performed by: INTERNAL MEDICINE

## 2017-11-16 PROCEDURE — G8400 PT W/DXA NO RESULTS DOC: HCPCS | Performed by: INTERNAL MEDICINE

## 2017-11-16 PROCEDURE — G8484 FLU IMMUNIZE NO ADMIN: HCPCS | Performed by: INTERNAL MEDICINE

## 2017-11-16 PROCEDURE — 99214 OFFICE O/P EST MOD 30 MIN: CPT | Performed by: INTERNAL MEDICINE

## 2017-11-16 PROCEDURE — G8427 DOCREV CUR MEDS BY ELIG CLIN: HCPCS | Performed by: INTERNAL MEDICINE

## 2017-11-16 PROCEDURE — 1090F PRES/ABSN URINE INCON ASSESS: CPT | Performed by: INTERNAL MEDICINE

## 2017-11-16 PROCEDURE — 99211 OFF/OP EST MAY X REQ PHY/QHP: CPT

## 2017-11-16 PROCEDURE — 36415 COLL VENOUS BLD VENIPUNCTURE: CPT

## 2017-11-16 PROCEDURE — G8417 CALC BMI ABV UP PARAM F/U: HCPCS | Performed by: INTERNAL MEDICINE

## 2017-11-16 PROCEDURE — 1123F ACP DISCUSS/DSCN MKR DOCD: CPT | Performed by: INTERNAL MEDICINE

## 2017-11-16 PROCEDURE — 80047 BASIC METABLC PNL IONIZED CA: CPT

## 2017-11-16 PROCEDURE — 4040F PNEUMOC VAC/ADMIN/RCVD: CPT | Performed by: INTERNAL MEDICINE

## 2017-11-16 PROCEDURE — 3014F SCREEN MAMMO DOC REV: CPT | Performed by: INTERNAL MEDICINE

## 2017-11-16 PROCEDURE — 85025 COMPLETE CBC W/AUTO DIFF WBC: CPT

## 2017-11-16 PROCEDURE — 80076 HEPATIC FUNCTION PANEL: CPT

## 2017-11-16 PROCEDURE — 3017F COLORECTAL CA SCREEN DOC REV: CPT | Performed by: INTERNAL MEDICINE

## 2017-11-16 NOTE — PROGRESS NOTES
[facility] PROFESSIONAL SERVICES  [facility]  [address] [facility] [address]  [address]  Dept: [phone]  Dept Fax: [phone]  Loc: [phone]       Subjective:      Chief Complaint: Debra Jackson is a 61-year-old female with a history of breast cancer. She was noted to have some thickening in the upper outer quadrant of the left breast. On mammography, she had extremely dense breasts. There was a cluster fine calcifications in the left breast. An ultrasound was obtained and ultrasound guided biopsy was completed. She also had low density towards the left axilla which was suspicious for enlarged lymph node. Core biopsy of the left breast mass and the enlarged lymph node revealed invasive ductal carcinoma, nuclear grade 1-2 as well as metastatic ductal adenocarcinoma in the lymph node. The tumor was noted to be estrogen receptor positive (100%) and progesterone receptor positive (90%). HER-2-олег was not amplified. She subsequently did undergo an MRI to rule out additional lesions and CT scans of the chest, abdomen and pelvis to evaluate for metastatic disease. There is no obvious evidence of distant metastatic disease. There was ground glass attenuation along the right major fissure of the lung which could represent inflammation or scarring, but nodules cannot not be entirely excluded. MRI revealed a stable 7 mm lobular mass right breast 11 o'clock posterior depth, appeared benign. There was an additional 5 mm round focus in the left breast 11 o'clock posterior depth which had a low suspicion for malignancy, but an ultrasound guided biopsy of this area confirmed benign tissue. There was 2 cm mass left breast 2 o'clock posterior depth consistent with a known carcinoma, extended to but did not involve the pectoralis muscle. The patient was started in neoadjuvant chemotherapy. She completed chemotherapy, surgery and radiation therapy treatment.      HPI:  The patient is here today for follow up evaluation. She is here today for follow-up of her history of breast cancer. At this time she has no evidence of recurrence of her malignancy. The patient continues to take adjuvant therapy with Arimidex. She is taking Arimidex 1 mg tablet by mouth daily. The patient reports that she is tolerating this well and without any significant side effects or toxicity. The patient has no signs or symptoms that be suggestive of recurrence of her malignancy on today's clinical evaluation. She does have a history of a pleural effusion of uncertain etiology. This has not recurred recently. The patient denies shortness of breath, chest pain, a change in bowel habits or a change in bladder habits. ECOG performance status is level 0. Her most recent mammogram was on April 17, 2017. This was reported as a benign appearing mammogram.  Her next mammogram will be completed in April 2018. PMH, SH, and FH:  I reviewed the PMH, SH and FH as noted on the electronic medical record. There have been no changes as noted in the previous documentation. Review of Systems   Constitutional: Negative. HENT: Negative. Eyes: Negative. Respiratory: Negative. Cardiovascular: Negative. Gastrointestinal: Negative. Genitourinary: Negative. Musculoskeletal: Negative. Skin: Negative. Neurological: Negative. Hematological: Negative. Psychiatric/Behavioral: Negative. Objective:   Physical Exam   Vitals:    11/16/17 1026   BP: 138/67   Pulse: 83   Resp: 18   Temp: 97.1 °F (36.2 °C)   SpO2: 98%   Vitals reviewed and are stable. Constitutional: Well-developed and well-nourished. No acute distress. HENT: Normocephalic and atraumatic. Eyes: Pupils are equal and reactive. No scleral icterus. Neck: Overall appearance is symmetrical. No identifiable masses. Chest: Bilateral breast exam displays no palpable abnormalities. Surgical incisions are well-healed.   There is no evidence of any localized recurrence. Pulmonary: Effort normal. No respiratory distress. Cardiovascular: RRR. No edema in any of the four extremities. Abdominal: Soft. No hepatomegaly or splenomegaly. Musculoskeletal: Gait is normal. Muscle strength and tone good. Neurological: Alert and oriented to person, place, and time. Judgment and thought content normal.  Skin: Skin is warm and dry. No rash. Psychiatric: Mood and affect appropriate for the clinical situation. Behavior is normal.        Data Analysis:    Hematology 11/16/2017 7/18/2017 5/11/2017   WBC 7.5 6.9 6.7   RBC 4.48 4.26 4.51   HGB 13.5 13.2 13.5   HCT 40.3 38.1 40.6   MCV 90 89.5 90   RDW 11.5 14.0 11.2 (L)    236 236     Hematology 11/17/2016   WBC 6.7   RBC 4.42   HGB 13.3   HCT 39.2   MCV 89   RDW 11.8        Assessment:   1. Breast cancer. 2.  Arimidex therapy. Plan:   1. Continue Arimidex address 1 mg tablet by mouth daily. 2.  Monitor for recurrence of malignancy. 3.  Continue annual mammogram - next mammogram in April, 2018. 4.  Monitor for side effects and toxicity from Arimidex. Melba Rubio M.D.   [facility]

## 2017-12-12 ENCOUNTER — NURSE ONLY (OUTPATIENT)
Dept: FAMILY MEDICINE CLINIC | Age: 67
End: 2017-12-12
Payer: MEDICARE

## 2017-12-12 DIAGNOSIS — Z23 NEED FOR PNEUMOCOCCAL VACCINATION: Primary | ICD-10-CM

## 2017-12-12 PROCEDURE — 90732 PPSV23 VACC 2 YRS+ SUBQ/IM: CPT | Performed by: NURSE PRACTITIONER

## 2017-12-12 PROCEDURE — G0009 ADMIN PNEUMOCOCCAL VACCINE: HCPCS | Performed by: NURSE PRACTITIONER

## 2017-12-26 RX ORDER — FLECAINIDE ACETATE 50 MG/1
TABLET ORAL
Qty: 180 TABLET | Refills: 3 | Status: SHIPPED | OUTPATIENT
Start: 2017-12-26 | End: 2018-12-17 | Stop reason: SDUPTHER

## 2018-03-05 ENCOUNTER — OFFICE VISIT (OUTPATIENT)
Dept: CARDIOLOGY CLINIC | Age: 68
End: 2018-03-05
Payer: MEDICARE

## 2018-03-05 VITALS
HEIGHT: 64 IN | DIASTOLIC BLOOD PRESSURE: 60 MMHG | HEART RATE: 70 BPM | WEIGHT: 155 LBS | BODY MASS INDEX: 26.46 KG/M2 | SYSTOLIC BLOOD PRESSURE: 118 MMHG

## 2018-03-05 DIAGNOSIS — I48.0 PAF (PAROXYSMAL ATRIAL FIBRILLATION) (HCC): Primary | ICD-10-CM

## 2018-03-05 DIAGNOSIS — Z98.890 S/P CARDIAC CATH: ICD-10-CM

## 2018-03-05 DIAGNOSIS — R06.02 SOB (SHORTNESS OF BREATH) ON EXERTION: ICD-10-CM

## 2018-03-05 PROCEDURE — 3014F SCREEN MAMMO DOC REV: CPT | Performed by: INTERNAL MEDICINE

## 2018-03-05 PROCEDURE — G8417 CALC BMI ABV UP PARAM F/U: HCPCS | Performed by: INTERNAL MEDICINE

## 2018-03-05 PROCEDURE — G8427 DOCREV CUR MEDS BY ELIG CLIN: HCPCS | Performed by: INTERNAL MEDICINE

## 2018-03-05 PROCEDURE — 93000 ELECTROCARDIOGRAM COMPLETE: CPT | Performed by: INTERNAL MEDICINE

## 2018-03-05 PROCEDURE — 1123F ACP DISCUSS/DSCN MKR DOCD: CPT | Performed by: INTERNAL MEDICINE

## 2018-03-05 PROCEDURE — 99214 OFFICE O/P EST MOD 30 MIN: CPT | Performed by: INTERNAL MEDICINE

## 2018-03-05 PROCEDURE — G8400 PT W/DXA NO RESULTS DOC: HCPCS | Performed by: INTERNAL MEDICINE

## 2018-03-05 PROCEDURE — 1036F TOBACCO NON-USER: CPT | Performed by: INTERNAL MEDICINE

## 2018-03-05 PROCEDURE — 4040F PNEUMOC VAC/ADMIN/RCVD: CPT | Performed by: INTERNAL MEDICINE

## 2018-03-05 PROCEDURE — 1090F PRES/ABSN URINE INCON ASSESS: CPT | Performed by: INTERNAL MEDICINE

## 2018-03-05 PROCEDURE — 3017F COLORECTAL CA SCREEN DOC REV: CPT | Performed by: INTERNAL MEDICINE

## 2018-03-05 PROCEDURE — G8484 FLU IMMUNIZE NO ADMIN: HCPCS | Performed by: INTERNAL MEDICINE

## 2018-03-05 NOTE — PROGRESS NOTES
Chief Complaint   Patient presents with    6 Month Follow-Up    Shortness of Breath    Atrial Fibrillation   Seen and evaluated in the hospital for for PNA and small pericardial effusionm  And discharged   Came today for F/U      Pt here for 6 mo check up     Occasional Palpitations    Chronic sob on exertion stairs       EKG done today    Denies chest pain,  dizziness, edema    Feels heart racing once in a while    C/o sob when going uphill or steps, palpitations, dizzy, lightheaded and peripheral edema on left side when standing on feet all day. Has occasional palpitations     Patient Seen, Chart, Consults notes, Labs, Radiology studies reviewed.         Patient Active Problem List   Diagnosis    Breast cancer metastasized to axillary lymph node ([address]    Encounter for antineoplastic chemotherapy    Anemia    Leukopenia    Encounter for care related to Port-a-Cath    Febrile illness, acute    Pneumonia    Pleural effusion    Pericardial effusion- small    Paroxysmal a-fib (HCC)    Abnormal EKG    Elevated alkaline phosphatase level    Severe malnutrition (HCC)    Generalized weakness    Leukocytosis    Weakness    Atrial fibrillation (HCC)    Use of anastrozole (Arimidex)    PAF (paroxysmal atrial fibrillation) (HCC)    Bilateral leg edema- dependant Trace    Pre-op evaluation- for rotator cuff surgery    SOB (shortness of breath) on exertion    Intermittent palpitations    S/P cardiac cath- 7/18/17- LM-P, LAD MID AND DISTAL 30%, LCX SMALL PATENT , RCA LARGED PATENT, EDP 5 MMHG, EF 60%- MD RX       Past Surgical History:   Procedure Laterality Date    BREAST LUMPECTOMY Left 06/26/2014    Needle Loc x2 with Axillary node dissection    BUNIONECTOMY      CARPAL TUNNEL RELEASE Right 11/28/2016    COLONOSCOPY  12/02/2016    OTHER SURGICAL HISTORY  08/03/2017    rotar cuff right repair     ROTATOR CUFF REPAIR Right 08/03/2017        TONSILLECTOMY AND ADENOIDECTOMY Bilateral No Known Allergies     Family History   Problem Relation Age of Onset    Cancer Mother     Mult Sclerosis Mother     Alcohol Abuse Father     Cancer Father         Social History     Social History    Marital status:      Spouse name: N/A    Number of children: 3    Years of education: N/A     Occupational History    Not on file. Social History Main Topics    Smoking status: Former Smoker     Packs/day: 0.25     Years: 25.00     Quit date: 1/1/2005    Smokeless tobacco: Never Used    Alcohol use 0.0 oz/week      Comment: social    Drug use: No    Sexual activity: Not on file     Other Topics Concern    Not on file     Social History Narrative    No narrative on file       Current Outpatient Prescriptions   Medication Sig Dispense Refill    calcium carbonate (OSCAL) 500 MG TABS tablet Take 500 mg by mouth 2 times daily      anastrozole (ARIMIDEX) 1 MG tablet take 1 tablet by mouth once daily REQUEST FOR 90 DAY RX 90 tablet 3    diltiazem (CARDIZEM SR) 120 MG SR capsule Take 1 capsule by mouth daily 60 capsule 3    flecainide (TAMBOCOR) 50 MG tablet TAKE 1 TABLET TWICE A  tablet 3    Multiple Vitamins-Minerals (MULTIVITAMIN PO) Take  by mouth daily.  aspirin 325 MG EC tablet Take 1 tablet by mouth daily. 30 tablet 3    acetaminophen 650 MG TABS Take 650 mg by mouth every 4 hours as needed. 120 tablet 3     No current facility-administered medications for this visit. Review of Systems -     General ROS: negative  Psychological ROS: negative  Hematological and Lymphatic ROS: No history of blood clots or bleeding disorder.    Respiratory ROS: no cough, shortness of breath, or wheezing  Cardiovascular ROS: no chest pain or dyspnea on exertion  Gastrointestinal ROS: negative  Genito-Urinary ROS: no dysuria, trouble voiding, or hematuria  Musculoskeletal ROS: negative  Neurological ROS: no TIA or stroke symptoms  Dermatological ROS: negative      Blood pressure 118/60,

## 2018-03-12 ENCOUNTER — HOSPITAL ENCOUNTER (OUTPATIENT)
Dept: NON INVASIVE DIAGNOSTICS | Age: 68
Discharge: HOME | End: 2018-03-12
Payer: MEDICARE

## 2018-03-12 DIAGNOSIS — Z98.890 S/P CARDIAC CATH: ICD-10-CM

## 2018-03-12 DIAGNOSIS — R06.02 SOB (SHORTNESS OF BREATH) ON EXERTION: ICD-10-CM

## 2018-03-12 DIAGNOSIS — I48.0 PAF (PAROXYSMAL ATRIAL FIBRILLATION) (HCC): ICD-10-CM

## 2018-03-12 PROCEDURE — 93270 REMOTE 30 DAY ECG REV/REPORT: CPT

## 2018-03-26 ENCOUNTER — OFFICE VISIT (OUTPATIENT)
Dept: FAMILY MEDICINE CLINIC | Age: 68
End: 2018-03-26
Payer: MEDICARE

## 2018-03-26 VITALS
BODY MASS INDEX: 27.36 KG/M2 | OXYGEN SATURATION: 98 % | DIASTOLIC BLOOD PRESSURE: 70 MMHG | SYSTOLIC BLOOD PRESSURE: 122 MMHG | HEART RATE: 80 BPM | WEIGHT: 159.4 LBS

## 2018-03-26 DIAGNOSIS — C50.912 CARCINOMA OF LEFT BREAST METASTATIC TO AXILLARY LYMPH NODE (HCC): Primary | ICD-10-CM

## 2018-03-26 DIAGNOSIS — E78.5 HYPERLIPIDEMIA, UNSPECIFIED HYPERLIPIDEMIA TYPE: ICD-10-CM

## 2018-03-26 DIAGNOSIS — I48.0 PAROXYSMAL A-FIB (HCC): ICD-10-CM

## 2018-03-26 DIAGNOSIS — C77.3 CARCINOMA OF LEFT BREAST METASTATIC TO AXILLARY LYMPH NODE (HCC): Primary | ICD-10-CM

## 2018-03-26 PROCEDURE — 1090F PRES/ABSN URINE INCON ASSESS: CPT | Performed by: NURSE PRACTITIONER

## 2018-03-26 PROCEDURE — 1123F ACP DISCUSS/DSCN MKR DOCD: CPT | Performed by: NURSE PRACTITIONER

## 2018-03-26 PROCEDURE — 1036F TOBACCO NON-USER: CPT | Performed by: NURSE PRACTITIONER

## 2018-03-26 PROCEDURE — G8417 CALC BMI ABV UP PARAM F/U: HCPCS | Performed by: NURSE PRACTITIONER

## 2018-03-26 PROCEDURE — 99214 OFFICE O/P EST MOD 30 MIN: CPT | Performed by: NURSE PRACTITIONER

## 2018-03-26 PROCEDURE — 4040F PNEUMOC VAC/ADMIN/RCVD: CPT | Performed by: NURSE PRACTITIONER

## 2018-03-26 PROCEDURE — G8427 DOCREV CUR MEDS BY ELIG CLIN: HCPCS | Performed by: NURSE PRACTITIONER

## 2018-03-26 PROCEDURE — G8400 PT W/DXA NO RESULTS DOC: HCPCS | Performed by: NURSE PRACTITIONER

## 2018-03-26 PROCEDURE — 3014F SCREEN MAMMO DOC REV: CPT | Performed by: NURSE PRACTITIONER

## 2018-03-26 PROCEDURE — 3017F COLORECTAL CA SCREEN DOC REV: CPT | Performed by: NURSE PRACTITIONER

## 2018-03-26 PROCEDURE — G8484 FLU IMMUNIZE NO ADMIN: HCPCS | Performed by: NURSE PRACTITIONER

## 2018-03-26 ASSESSMENT — ENCOUNTER SYMPTOMS
RESPIRATORY NEGATIVE: 1
EYES NEGATIVE: 1
GASTROINTESTINAL NEGATIVE: 1

## 2018-03-26 ASSESSMENT — PATIENT HEALTH QUESTIONNAIRE - PHQ9
2. FEELING DOWN, DEPRESSED OR HOPELESS: 0
SUM OF ALL RESPONSES TO PHQ QUESTIONS 1-9: 0
1. LITTLE INTEREST OR PLEASURE IN DOING THINGS: 0
SUM OF ALL RESPONSES TO PHQ9 QUESTIONS 1 & 2: 0

## 2018-04-02 ENCOUNTER — HOSPITAL ENCOUNTER (OUTPATIENT)
Age: 68
Discharge: HOME | End: 2018-04-02
Payer: MEDICARE

## 2018-04-02 DIAGNOSIS — I48.0 PAROXYSMAL A-FIB (HCC): ICD-10-CM

## 2018-04-02 DIAGNOSIS — E78.5 HYPERLIPIDEMIA, UNSPECIFIED HYPERLIPIDEMIA TYPE: ICD-10-CM

## 2018-04-02 LAB
CHOLESTEROL, TOTAL: 217 MG/DL (ref 100–199)
HDLC SERPL-MCNC: 104 MG/DL
LDL CHOLESTEROL CALCULATED: 100 MG/DL
TRIGL SERPL-MCNC: 67 MG/DL (ref 0–199)

## 2018-04-02 PROCEDURE — 36415 COLL VENOUS BLD VENIPUNCTURE: CPT

## 2018-04-02 PROCEDURE — 80061 LIPID PANEL: CPT

## 2018-04-03 ENCOUNTER — OFFICE VISIT (OUTPATIENT)
Dept: FAMILY MEDICINE CLINIC | Age: 68
End: 2018-04-03
Payer: MEDICARE

## 2018-04-03 VITALS
BODY MASS INDEX: 26.26 KG/M2 | SYSTOLIC BLOOD PRESSURE: 120 MMHG | HEART RATE: 80 BPM | WEIGHT: 153 LBS | DIASTOLIC BLOOD PRESSURE: 70 MMHG | TEMPERATURE: 98.5 F

## 2018-04-03 DIAGNOSIS — B00.1 COLD SORE: ICD-10-CM

## 2018-04-03 DIAGNOSIS — R05.9 COUGH: Primary | ICD-10-CM

## 2018-04-03 LAB
INFLUENZA VIRUS A RNA: NEGATIVE
INFLUENZA VIRUS B RNA: NEGATIVE

## 2018-04-03 PROCEDURE — G8417 CALC BMI ABV UP PARAM F/U: HCPCS | Performed by: NURSE PRACTITIONER

## 2018-04-03 PROCEDURE — 1036F TOBACCO NON-USER: CPT | Performed by: NURSE PRACTITIONER

## 2018-04-03 PROCEDURE — G8427 DOCREV CUR MEDS BY ELIG CLIN: HCPCS | Performed by: NURSE PRACTITIONER

## 2018-04-03 PROCEDURE — 87502 INFLUENZA DNA AMP PROBE: CPT | Performed by: NURSE PRACTITIONER

## 2018-04-03 PROCEDURE — 1123F ACP DISCUSS/DSCN MKR DOCD: CPT | Performed by: NURSE PRACTITIONER

## 2018-04-03 PROCEDURE — 3017F COLORECTAL CA SCREEN DOC REV: CPT | Performed by: NURSE PRACTITIONER

## 2018-04-03 PROCEDURE — 3014F SCREEN MAMMO DOC REV: CPT | Performed by: NURSE PRACTITIONER

## 2018-04-03 PROCEDURE — G8400 PT W/DXA NO RESULTS DOC: HCPCS | Performed by: NURSE PRACTITIONER

## 2018-04-03 PROCEDURE — 4040F PNEUMOC VAC/ADMIN/RCVD: CPT | Performed by: NURSE PRACTITIONER

## 2018-04-03 PROCEDURE — 1090F PRES/ABSN URINE INCON ASSESS: CPT | Performed by: NURSE PRACTITIONER

## 2018-04-03 PROCEDURE — 99213 OFFICE O/P EST LOW 20 MIN: CPT | Performed by: NURSE PRACTITIONER

## 2018-04-03 RX ORDER — CEFDINIR 300 MG/1
300 CAPSULE ORAL 2 TIMES DAILY
Qty: 20 CAPSULE | Refills: 0 | Status: SHIPPED | OUTPATIENT
Start: 2018-04-03 | End: 2019-10-02 | Stop reason: SDUPTHER

## 2018-04-03 RX ORDER — VALACYCLOVIR HYDROCHLORIDE 1 G/1
1000 TABLET, FILM COATED ORAL 2 TIMES DAILY
Qty: 10 TABLET | Refills: 0 | Status: SHIPPED | OUTPATIENT
Start: 2018-04-03 | End: 2018-04-08

## 2018-04-16 NOTE — PROCEDURES
[address] [place] [address]                                   EVENT MONITOR    PATIENT NAME: Reva Kimble                  :        1950  MED REC NO:   [id number]                           ROOM:  ACCOUNT NO:   [de-identified]                           ADMIT DATE: 2018  PROVIDER:     Adan Lloyd. Mateo Brandon M.D.    Luly Pierre:  2018    TEST TYPE:  Event monitor. DATE OF ENROLMENT:  2018 to 04/10/2018    INDICATION:  Paroxysmal atrial fibrillation. FINDINGS:  There are four strips that have been captured. Multiple  baseline strips and one strip captured later. So, all of them basically  showed normal sinus rhythm. Heart rate ranging from 72 beats per minute to  100 beats per minute. So, most of them are 72, 77, 75, and 100 beats per  minute. So, basically no atrial fibrillation. No other form of  arrhythmia. CONCLUSION:  This is a benign event monitor finding with normal sinus  rhythm. No evidence of atrial fibrillation. Heart rate ranging from 72 to  100 beats per minute. No other form of arrhythmia noted. For sure, there is no evidence of atrial fibrillation. Merly Flynn.  Ivana Simon M.D.    D: 04/15/2018 13:04:28       T: 04/15/2018 16:54:25     SAMINA_CAROLE  Job#: [id number]     Doc#: [id number]    CC:

## 2018-04-18 ENCOUNTER — TELEPHONE (OUTPATIENT)
Dept: FAMILY MEDICINE CLINIC | Age: 68
End: 2018-04-18

## 2018-04-23 ENCOUNTER — HOSPITAL ENCOUNTER (OUTPATIENT)
Dept: WOMENS IMAGING | Age: 68
Discharge: HOME | End: 2018-04-23
Payer: MEDICARE

## 2018-04-23 DIAGNOSIS — Z12.31 ENCOUNTER FOR SCREENING MAMMOGRAM FOR BREAST CANCER: ICD-10-CM

## 2018-04-23 DIAGNOSIS — C77.3 CARCINOMA OF LEFT BREAST METASTATIC TO AXILLARY LYMPH NODE (HCC): ICD-10-CM

## 2018-04-23 DIAGNOSIS — Z79.811 USE OF ANASTROZOLE (ARIMIDEX): ICD-10-CM

## 2018-04-23 DIAGNOSIS — C50.912 CARCINOMA OF LEFT BREAST METASTATIC TO AXILLARY LYMPH NODE (HCC): ICD-10-CM

## 2018-04-23 DIAGNOSIS — Z12.31 ENCOUNTER FOR SCREENING MAMMOGRAM FOR MALIGNANT NEOPLASM OF BREAST: ICD-10-CM

## 2018-04-23 DIAGNOSIS — Z12.31 ENCOUNTER FOR SCREENING MAMMOGRAM FOR BREAST CANCER: Primary | ICD-10-CM

## 2018-04-23 PROCEDURE — 77063 BREAST TOMOSYNTHESIS BI: CPT

## 2018-05-11 ENCOUNTER — NURSE ONLY (OUTPATIENT)
Dept: FAMILY MEDICINE CLINIC | Age: 68
End: 2018-05-11
Payer: MEDICARE

## 2018-05-11 DIAGNOSIS — R10.2 PELVIC PRESSURE IN FEMALE: Primary | ICD-10-CM

## 2018-05-11 LAB
BILIRUBIN URINE: NEGATIVE
BILIRUBIN, POC: NEGATIVE
BLOOD URINE, POC: ABNORMAL
BLOOD URINE, POC: NORMAL
CHARACTER, URINE: ABNORMAL
CLARITY, POC: NORMAL
COLOR, POC: YELLOW
COLOR, URINE: YELLOW
GLUCOSE URINE, POC: NEGATIVE
GLUCOSE URINE: NEGATIVE MG/DL
KETONES, POC: NEGATIVE
KETONES, URINE: NEGATIVE
LEUKOCYTE CLUMPS, URINE: ABNORMAL
LEUKOCYTE EST, POC: NORMAL
NITRITE, POC: NEGATIVE
NITRITE, URINE: NEGATIVE
PH, POC: 5
PH, URINE: 5
PROTEIN, POC: NEGATIVE
PROTEIN, URINE: NEGATIVE MG/DL
SPECIFIC GRAVITY, POC: 1.02
SPECIFIC GRAVITY, URINE: 1.02 (ref 1–1.03)
UROBILINOGEN, POC: 0.2
UROBILINOGEN, URINE: 0.2 EU/DL

## 2018-05-11 PROCEDURE — 81003 URINALYSIS AUTO W/O SCOPE: CPT | Performed by: NURSE PRACTITIONER

## 2018-05-11 PROCEDURE — 99211 OFF/OP EST MAY X REQ PHY/QHP: CPT | Performed by: NURSE PRACTITIONER

## 2018-05-11 RX ORDER — CEPHALEXIN 500 MG/1
500 CAPSULE ORAL 3 TIMES DAILY
Qty: 30 CAPSULE | Refills: 0 | Status: SHIPPED | OUTPATIENT
Start: 2018-05-11 | End: 2018-05-18

## 2018-05-13 LAB
ORGANISM: ABNORMAL
URINE CULTURE, ROUTINE: ABNORMAL

## 2018-05-17 ENCOUNTER — OFFICE VISIT (OUTPATIENT)
Dept: ONCOLOGY | Age: 68
End: 2018-05-17
Payer: MEDICARE

## 2018-05-17 ENCOUNTER — HOSPITAL ENCOUNTER (OUTPATIENT)
Dept: INFUSION THERAPY | Age: 68
Discharge: HOME | End: 2018-05-17
Payer: MEDICARE

## 2018-05-17 VITALS
WEIGHT: 151.2 LBS | HEART RATE: 68 BPM | RESPIRATION RATE: 18 BRPM | TEMPERATURE: 97 F | HEIGHT: 64 IN | OXYGEN SATURATION: 100 % | DIASTOLIC BLOOD PRESSURE: 76 MMHG | BODY MASS INDEX: 25.81 KG/M2 | SYSTOLIC BLOOD PRESSURE: 136 MMHG

## 2018-05-17 DIAGNOSIS — Z79.811 USE OF ANASTROZOLE (ARIMIDEX): ICD-10-CM

## 2018-05-17 DIAGNOSIS — C50.912 CARCINOMA OF LEFT BREAST METASTATIC TO AXILLARY LYMPH NODE (HCC): Primary | ICD-10-CM

## 2018-05-17 DIAGNOSIS — C77.3 CARCINOMA OF LEFT BREAST METASTATIC TO AXILLARY LYMPH NODE (HCC): ICD-10-CM

## 2018-05-17 DIAGNOSIS — C50.912 CARCINOMA OF LEFT BREAST METASTATIC TO AXILLARY LYMPH NODE (HCC): ICD-10-CM

## 2018-05-17 DIAGNOSIS — C77.3 CARCINOMA OF LEFT BREAST METASTATIC TO AXILLARY LYMPH NODE (HCC): Primary | ICD-10-CM

## 2018-05-17 LAB
ALBUMIN SERPL-MCNC: 4.2 G/DL (ref 3.5–5.1)
ALP BLD-CCNC: 102 U/L (ref 38–126)
ALT SERPL-CCNC: 18 U/L (ref 11–66)
AST SERPL-CCNC: 22 U/L (ref 5–40)
BASINOPHIL, AUTOMATED: 0 % (ref 0–3)
BILIRUB SERPL-MCNC: 0.3 MG/DL (ref 0.3–1.2)
BILIRUBIN DIRECT: < 0.2 MG/DL (ref 0–0.3)
BUN, WHOLE BLOOD: 14 MG/DL (ref 8–26)
CHLORIDE, WHOLE BLOOD: 99 MEQ/L (ref 98–109)
CREATININE, WHOLE BLOOD: 0.9 MG/DL (ref 0.5–1.2)
EOSINOPHILS RELATIVE PERCENT: 5 % (ref 0–4)
GFR, ESTIMATED: 66 ML/MIN/1.73M2
GLUCOSE, WHOLE BLOOD: 88 MG/DL (ref 70–108)
HCT VFR BLD CALC: 41 % (ref 37–47)
HEMOGLOBIN: 13.7 GM/DL (ref 12–16)
IONIZED CALCIUM, WHOLE BLOOD: 1.23 MMOL/L (ref 1.12–1.32)
LYMPHOCYTES # BLD: 34 % (ref 15–47)
MCH RBC QN AUTO: 30.1 PG (ref 27–31)
MCHC RBC AUTO-ENTMCNC: 33.4 GM/DL (ref 33–37)
MCV RBC AUTO: 90 FL (ref 81–99)
MONOCYTES: 6 % (ref 0–12)
PDW BLD-RTO: 11.8 % (ref 11.5–14.5)
PLATELET # BLD: 265 THOU/MM3 (ref 130–400)
PMV BLD AUTO: 7 FL (ref 7.4–10.4)
POTASSIUM, WHOLE BLOOD: 4.6 MEQ/L (ref 3.5–4.9)
RBC # BLD: 4.55 MILL/MM3 (ref 4.2–5.4)
SEG NEUTROPHILS: 55 % (ref 43–75)
SODIUM, WHOLE BLOOD: 136 MEQ/L (ref 138–146)
TOTAL CO2, WHOLE BLOOD: 31 MEQ/L (ref 23–33)
TOTAL PROTEIN: 7.1 G/DL (ref 6.1–8)
WBC # BLD: 7 THOU/MM3 (ref 4.8–10.8)

## 2018-05-17 PROCEDURE — 80076 HEPATIC FUNCTION PANEL: CPT

## 2018-05-17 PROCEDURE — 85025 COMPLETE CBC W/AUTO DIFF WBC: CPT

## 2018-05-17 PROCEDURE — 1036F TOBACCO NON-USER: CPT | Performed by: INTERNAL MEDICINE

## 2018-05-17 PROCEDURE — 1090F PRES/ABSN URINE INCON ASSESS: CPT | Performed by: INTERNAL MEDICINE

## 2018-05-17 PROCEDURE — 80047 BASIC METABLC PNL IONIZED CA: CPT

## 2018-05-17 PROCEDURE — 3017F COLORECTAL CA SCREEN DOC REV: CPT | Performed by: INTERNAL MEDICINE

## 2018-05-17 PROCEDURE — 99214 OFFICE O/P EST MOD 30 MIN: CPT | Performed by: INTERNAL MEDICINE

## 2018-05-17 PROCEDURE — 4040F PNEUMOC VAC/ADMIN/RCVD: CPT | Performed by: INTERNAL MEDICINE

## 2018-05-17 PROCEDURE — G8427 DOCREV CUR MEDS BY ELIG CLIN: HCPCS | Performed by: INTERNAL MEDICINE

## 2018-05-17 PROCEDURE — 99211 OFF/OP EST MAY X REQ PHY/QHP: CPT

## 2018-05-17 PROCEDURE — 36415 COLL VENOUS BLD VENIPUNCTURE: CPT

## 2018-05-17 PROCEDURE — 1123F ACP DISCUSS/DSCN MKR DOCD: CPT | Performed by: INTERNAL MEDICINE

## 2018-05-17 PROCEDURE — G8400 PT W/DXA NO RESULTS DOC: HCPCS | Performed by: INTERNAL MEDICINE

## 2018-05-17 PROCEDURE — G8417 CALC BMI ABV UP PARAM F/U: HCPCS | Performed by: INTERNAL MEDICINE

## 2018-05-24 ENCOUNTER — HOSPITAL ENCOUNTER (OUTPATIENT)
Age: 68
Discharge: HOME | End: 2018-05-24
Payer: MEDICARE

## 2018-05-24 ENCOUNTER — OFFICE VISIT (OUTPATIENT)
Dept: FAMILY MEDICINE CLINIC | Age: 68
End: 2018-05-24
Payer: MEDICARE

## 2018-05-24 ENCOUNTER — HOSPITAL ENCOUNTER (OUTPATIENT)
Dept: GENERAL RADIOLOGY | Age: 68
Discharge: HOME | End: 2018-05-24
Payer: MEDICARE

## 2018-05-24 VITALS
HEIGHT: 64 IN | OXYGEN SATURATION: 97 % | HEART RATE: 80 BPM | BODY MASS INDEX: 26.15 KG/M2 | DIASTOLIC BLOOD PRESSURE: 62 MMHG | RESPIRATION RATE: 14 BRPM | SYSTOLIC BLOOD PRESSURE: 134 MMHG | WEIGHT: 153.2 LBS | TEMPERATURE: 97 F

## 2018-05-24 DIAGNOSIS — M79.605 PAIN OF LEFT LOWER EXTREMITY: ICD-10-CM

## 2018-05-24 DIAGNOSIS — M79.605 PAIN OF LEFT LOWER EXTREMITY: Primary | ICD-10-CM

## 2018-05-24 PROCEDURE — 1036F TOBACCO NON-USER: CPT | Performed by: NURSE PRACTITIONER

## 2018-05-24 PROCEDURE — G8417 CALC BMI ABV UP PARAM F/U: HCPCS | Performed by: NURSE PRACTITIONER

## 2018-05-24 PROCEDURE — 1090F PRES/ABSN URINE INCON ASSESS: CPT | Performed by: NURSE PRACTITIONER

## 2018-05-24 PROCEDURE — 99213 OFFICE O/P EST LOW 20 MIN: CPT | Performed by: NURSE PRACTITIONER

## 2018-05-24 PROCEDURE — 1123F ACP DISCUSS/DSCN MKR DOCD: CPT | Performed by: NURSE PRACTITIONER

## 2018-05-24 PROCEDURE — 3017F COLORECTAL CA SCREEN DOC REV: CPT | Performed by: NURSE PRACTITIONER

## 2018-05-24 PROCEDURE — 73552 X-RAY EXAM OF FEMUR 2/>: CPT

## 2018-05-24 PROCEDURE — 73502 X-RAY EXAM HIP UNI 2-3 VIEWS: CPT

## 2018-05-24 PROCEDURE — G8427 DOCREV CUR MEDS BY ELIG CLIN: HCPCS | Performed by: NURSE PRACTITIONER

## 2018-05-24 PROCEDURE — 4040F PNEUMOC VAC/ADMIN/RCVD: CPT | Performed by: NURSE PRACTITIONER

## 2018-05-24 PROCEDURE — G8400 PT W/DXA NO RESULTS DOC: HCPCS | Performed by: NURSE PRACTITIONER

## 2018-05-24 RX ORDER — IBUPROFEN 800 MG/1
800 TABLET ORAL EVERY 8 HOURS PRN
Qty: 30 TABLET | Refills: 0 | Status: SHIPPED | OUTPATIENT
Start: 2018-05-24 | End: 2019-04-17

## 2018-05-24 RX ORDER — CYCLOBENZAPRINE HCL 5 MG
5 TABLET ORAL 3 TIMES DAILY PRN
Qty: 21 TABLET | Refills: 0 | Status: SHIPPED | OUTPATIENT
Start: 2018-05-24 | End: 2018-05-31

## 2018-05-24 ASSESSMENT — ENCOUNTER SYMPTOMS
GASTROINTESTINAL NEGATIVE: 1
RESPIRATORY NEGATIVE: 1
EYES NEGATIVE: 1

## 2018-06-11 RX ORDER — DILTIAZEM HYDROCHLORIDE 120 MG/1
CAPSULE, EXTENDED RELEASE ORAL
Qty: 90 CAPSULE | Refills: 0 | Status: SHIPPED | OUTPATIENT
Start: 2018-06-11 | End: 2018-06-18

## 2018-06-18 RX ORDER — DILTIAZEM HYDROCHLORIDE 120 MG/1
120 CAPSULE, COATED, EXTENDED RELEASE ORAL DAILY
COMMUNITY
End: 2018-06-18 | Stop reason: SDUPTHER

## 2018-06-18 RX ORDER — DILTIAZEM HYDROCHLORIDE 120 MG/1
120 CAPSULE, COATED, EXTENDED RELEASE ORAL DAILY
Qty: 90 CAPSULE | Refills: 0 | Status: SHIPPED | OUTPATIENT
Start: 2018-06-18 | End: 2018-07-10 | Stop reason: SDUPTHER

## 2018-07-10 RX ORDER — DILTIAZEM HYDROCHLORIDE 120 MG/1
120 CAPSULE, COATED, EXTENDED RELEASE ORAL DAILY
Qty: 90 CAPSULE | Refills: 0 | Status: SHIPPED | OUTPATIENT
Start: 2018-07-10 | End: 2018-07-30 | Stop reason: SDUPTHER

## 2018-07-16 DIAGNOSIS — C50.912 BREAST CANCER METASTASIZED TO AXILLARY LYMPH NODE, LEFT (HCC): ICD-10-CM

## 2018-07-16 DIAGNOSIS — C77.3 BREAST CANCER METASTASIZED TO AXILLARY LYMPH NODE, LEFT (HCC): ICD-10-CM

## 2018-07-16 RX ORDER — ANASTROZOLE 1 MG/1
TABLET ORAL
Qty: 90 TABLET | Refills: 3 | Status: SHIPPED | OUTPATIENT
Start: 2018-07-16 | End: 2018-11-13 | Stop reason: SDUPTHER

## 2018-07-23 ENCOUNTER — TELEPHONE (OUTPATIENT)
Dept: CARDIOLOGY CLINIC | Age: 68
End: 2018-07-23

## 2018-07-31 RX ORDER — DILTIAZEM HYDROCHLORIDE 120 MG/1
120 CAPSULE, COATED, EXTENDED RELEASE ORAL DAILY
Qty: 90 CAPSULE | Refills: 0 | Status: SHIPPED | OUTPATIENT
Start: 2018-07-31 | End: 2018-09-10 | Stop reason: SDUPTHER

## 2018-09-10 ENCOUNTER — OFFICE VISIT (OUTPATIENT)
Dept: CARDIOLOGY CLINIC | Age: 68
End: 2018-09-10
Payer: MEDICARE

## 2018-09-10 VITALS
HEART RATE: 80 BPM | SYSTOLIC BLOOD PRESSURE: 150 MMHG | DIASTOLIC BLOOD PRESSURE: 69 MMHG | BODY MASS INDEX: 25.65 KG/M2 | HEIGHT: 64 IN | WEIGHT: 150.25 LBS

## 2018-09-10 DIAGNOSIS — I48.0 PAF (PAROXYSMAL ATRIAL FIBRILLATION) (HCC): Primary | ICD-10-CM

## 2018-09-10 DIAGNOSIS — Z98.890 S/P CARDIAC CATH: ICD-10-CM

## 2018-09-10 DIAGNOSIS — R94.31 ABNORMAL EKG: ICD-10-CM

## 2018-09-10 PROCEDURE — G8417 CALC BMI ABV UP PARAM F/U: HCPCS | Performed by: INTERNAL MEDICINE

## 2018-09-10 PROCEDURE — G8427 DOCREV CUR MEDS BY ELIG CLIN: HCPCS | Performed by: INTERNAL MEDICINE

## 2018-09-10 PROCEDURE — 1101F PT FALLS ASSESS-DOCD LE1/YR: CPT | Performed by: INTERNAL MEDICINE

## 2018-09-10 PROCEDURE — 99214 OFFICE O/P EST MOD 30 MIN: CPT | Performed by: INTERNAL MEDICINE

## 2018-09-10 PROCEDURE — 1090F PRES/ABSN URINE INCON ASSESS: CPT | Performed by: INTERNAL MEDICINE

## 2018-09-10 PROCEDURE — 3017F COLORECTAL CA SCREEN DOC REV: CPT | Performed by: INTERNAL MEDICINE

## 2018-09-10 PROCEDURE — 4040F PNEUMOC VAC/ADMIN/RCVD: CPT | Performed by: INTERNAL MEDICINE

## 2018-09-10 PROCEDURE — G8400 PT W/DXA NO RESULTS DOC: HCPCS | Performed by: INTERNAL MEDICINE

## 2018-09-10 PROCEDURE — 1123F ACP DISCUSS/DSCN MKR DOCD: CPT | Performed by: INTERNAL MEDICINE

## 2018-09-10 PROCEDURE — 93000 ELECTROCARDIOGRAM COMPLETE: CPT | Performed by: INTERNAL MEDICINE

## 2018-09-10 PROCEDURE — 1036F TOBACCO NON-USER: CPT | Performed by: INTERNAL MEDICINE

## 2018-09-10 RX ORDER — DILTIAZEM HYDROCHLORIDE 120 MG/1
120 CAPSULE, COATED, EXTENDED RELEASE ORAL DAILY
Qty: 90 CAPSULE | Refills: 1 | Status: SHIPPED | OUTPATIENT
Start: 2018-09-10 | End: 2019-03-11 | Stop reason: SDUPTHER

## 2018-09-10 NOTE — PROGRESS NOTES
08/03/2017    rotar cuff right repair     ROTATOR CUFF REPAIR Right 08/03/2017        TONSILLECTOMY AND ADENOIDECTOMY Bilateral        No Known Allergies     Family History   Problem Relation Age of Onset    Cancer Mother     Mult Sclerosis Mother     Alcohol Abuse Father     Cancer Father         Social History     Social History    Marital status:      Spouse name: N/A    Number of children: 3    Years of education: N/A     Occupational History    Not on file. Social History Main Topics    Smoking status: Former Smoker     Packs/day: 0.25     Years: 25.00     Quit date: 1/1/2005    Smokeless tobacco: Never Used    Alcohol use 0.0 oz/week      Comment: social    Drug use: No    Sexual activity: Not on file     Other Topics Concern    Not on file     Social History Narrative    No narrative on file       Current Outpatient Prescriptions   Medication Sig Dispense Refill    calcium carbonate (OSCAL) 500 MG TABS tablet Take 500 mg by mouth 2 times daily      anastrozole (ARIMIDEX) 1 MG tablet take 1 tablet by mouth once daily REQUEST FOR 90 DAY RX 90 tablet 3    diltiazem (CARDIZEM SR) 120 MG SR capsule Take 1 capsule by mouth daily 60 capsule 3    flecainide (TAMBOCOR) 50 MG tablet TAKE 1 TABLET TWICE A  tablet 3    Multiple Vitamins-Minerals (MULTIVITAMIN PO) Take  by mouth daily.  aspirin 325 MG EC tablet Take 1 tablet by mouth daily. 30 tablet 3    acetaminophen 650 MG TABS Take 650 mg by mouth every 4 hours as needed. 120 tablet 3     No current facility-administered medications for this visit. Review of Systems -     General ROS: negative  Psychological ROS: negative  Hematological and Lymphatic ROS: No history of blood clots or bleeding disorder.    Respiratory ROS: no cough, shortness of breath, or wheezing  Cardiovascular ROS: no chest pain or dyspnea on exertion  Gastrointestinal ROS: negative  Genito-Urinary ROS: no dysuria, trouble voiding, or hematuria  Musculoskeletal ROS: negative  Neurological ROS: no TIA or stroke symptoms  Dermatological ROS: negative      Blood pressure (!) 150/69, pulse 80, height 5' 4\" (1.626 m), weight 150 lb 4 oz (68.2 kg). Physical Examination:    General appearance - alert, well appearing, and in no distress  Mental status - alert, oriented to person, place, and time  Neck - supple, no significant adenopathy, no JVD, or carotid bruits  Chest - clear to auscultation, no wheezes, rales or rhonchi, symmetric air entry  Heart - normal rate, regular rhythm, normal S1, S2, no murmurs, rubs, clicks or gallops  Abdomen - soft, nontender, nondistended, no masses or organomegaly  Neurological - alert, oriented, normal speech, no focal findings or movement disorder noted  Musculoskeletal - no joint tenderness, deformity or swelling  Extremities - peripheral pulses normal, no pedal edema, no clubbing or cyanosis  Skin - normal coloration and turgor, no rashes, no suspicious skin lesions noted    Lab  No results for input(s): CKTOTAL, CKMB, CKMBINDEX, TROPONINI in the last 72 hours.   CBC:   Lab Results   Component Value Date    WBC 7.0 05/17/2018    RBC 4.55 05/17/2018    HGB 13.7 05/17/2018    HCT 41.0 05/17/2018    MCV 90 05/17/2018    MCH 30.1 05/17/2018    MCHC 33.4 05/17/2018    RDW 11.8 05/17/2018     05/17/2018    MPV 7.0 05/17/2018     BMP:    Lab Results   Component Value Date     05/17/2018     07/18/2017    K 4.6 05/17/2018    K 4.2 07/18/2017     07/18/2017    CO2 28 07/18/2017    BUN 14 07/18/2017    LABALBU 4.2 05/17/2018    CREATININE 0.9 05/17/2018    CREATININE 0.6 07/18/2017    CALCIUM 9.4 07/18/2017    LABGLOM >90 07/18/2017    GLUCOSE 86 07/18/2017     Hepatic Function Panel:    Lab Results   Component Value Date    ALKPHOS 102 05/17/2018    ALT 18 05/17/2018    AST 22 05/17/2018    PROT 7.1 05/17/2018    BILITOT 0.3 05/17/2018    BILIDIR <0.2 05/17/2018    LABALBU 4.2 05/17/2018 Magnesium:    Lab Results   Component Value Date    MG 2.1 11/17/2014     Warfarin PT/INR:    No components found for: PTPATWAR,  PTINRWAR  HgBA1c:    No results found for: LABA1C  FLP:    Lab Results   Component Value Date    TRIG 67 04/02/2018     04/02/2018    LDLCALC 100 04/02/2018     TSH:    Lab Results   Component Value Date    TSH 1.080 11/12/2014       EKG 12/4/14-Sinus  Rhythm   -  Nonspecific T-abnormality. ABNORMAL  msec    SUMMARY:    Left ventricle:  Size was normal.  Systolic function was normal. Ejection fraction was estimated in the range   of 55 % to 65 %. There were no regional wall motion abnormalities. Left atrium:  The atrium was mildly dilated. Pericardium:  A small, loculated pericardial effusion was identified circumferential to   the heart. The fluid had no internal echoes. There was no evidence of hemodynamic compromise. COMPARISONS:  Comparison was made with the previous study of 12-Nov-2014. Pericardial   effusion appearance has not changed. Prepared and signed by    Citlali Rodrigez MD    EKG 3/3./15-Sinus  Rhythm    -  Nonspecific T-abnormality. No acute changes  ABNORMAL   msec    EKG 7/7/15-  Sinus  Rhythm   -  Negative precordial T-waves. WITHIN NORMAL LIMITS    EKG 12/7/15  Sinus  Rhythm   -  Nonspecific T-abnormality.   QTC  msec  ABNORMAL     EKG 6/12/17  NSR, prwp, no acute changed  QTC      Conclusions      Summary   Lexiscan EKG stress test is not suggestive for ischemia.   Calculated gated LVEF 69 %.   The T.I.D. ratio was 1.3 .   There was a small sized, mildly severe, reversible myocardial perfusion   defect of the apex and distal anterior wall.   The nuclear images is suggestive for mild myocardial ischemia in the apex   and distal segment of anterior wall.      Recommendation   Clinical correlation is recommended.      Signatures      ----------------------------------------------------------------   Electronically signed by Saida Amezquita MD (Interpreting   Cardiologist) on 06/26/2017 at 19:33   ----------------------------------------------------------------    ekg  9/10/18  Sinus  Rhythm   WITHIN NORMAL LIMITS   msec        Event monitor  CONCLUSION:  This is a benign event monitor finding with normal sinus  rhythm. No evidence of atrial fibrillation. Heart rate ranging from 72 to  100 beats per minute. No other form of arrhythmia noted.     For sure, there is no evidence of atrial fibrillation.      CHUY Sharma M.D.     D: 04/15/2018 13:04:28      Assessment     Diagnosis Orders   1. PAF (paroxysmal atrial fibrillation) ([facility] [address]     2. Abnormal EKG     3. S/P cardiac cath- 7/18/17- LM-P, LAD MID AND DISTAL 30%, LCX SMALL PATENT , RCA LARGED PATENT, EDP 5 MMHG, EF 60%- MD RX           Previous admission DX  New onset AFB: CVR now Less than 24 hours now IN NSR  CHADS2= 0  TSH, potassium and mag normal No Hx TEMI    Now moderate pericardial effusion by CT done this am  Small pericardial effusion by echo    EKG changes: new T wave inversions leads V4-V6  - trend top      Pleural effusion: s\p Lt thoracentesis 11-16 with 225 ml off    viral syndrome- with possible pericarditis and pleurisy    possible pericarditis and pleurisy    Hx breast CA- s\p lumpectomy and chemo/radiation    Atypical CP upon admit- resolved now      Plan     Still in nSR      Continue the current treatment and with constant vigilance to changes in symptoms and also any potential side effects. Return for care or seek medical attention immediately if symptoms got worse and/or develop new symptoms. Limited echo revealed stable small pericardial effusion- resolved  Completed months of colchicine      PAF 2015 and remain in NSR for over one yr- post chemo and radiation  chads of 0, chads vasc 2 ( age and female)  EKG NSR Normal QTC  Rate control for now and consider rhythm control down the line  Cont.  Cardizem cd 120 qd and flecanide  Remain in NSR for over one yr and will cont asa rather   OA considered and does not want it now  Check event mionitor_ revealed NSR  Cont asa 325 po qd  Any recurrence of atr fib she need OA    Need rhythm control and   cont Flecanide 50 BID    D/w the pat the plan of care    Echo and ekg reviewed and doing well    RTC in 6 months    Melina [facility]

## 2018-09-26 PROBLEM — Z01.818 PRE-OP EVALUATION: Status: RESOLVED | Noted: 2017-06-12 | Resolved: 2018-09-26

## 2018-10-15 ENCOUNTER — OFFICE VISIT (OUTPATIENT)
Dept: SURGERY | Age: 68
End: 2018-10-15
Payer: MEDICARE

## 2018-10-15 VITALS
SYSTOLIC BLOOD PRESSURE: 120 MMHG | HEART RATE: 88 BPM | TEMPERATURE: 98 F | BODY MASS INDEX: 26 KG/M2 | RESPIRATION RATE: 18 BRPM | HEIGHT: 64 IN | DIASTOLIC BLOOD PRESSURE: 80 MMHG | OXYGEN SATURATION: 99 % | WEIGHT: 152.3 LBS

## 2018-10-15 DIAGNOSIS — L81.9 PIGMENTED SKIN LESION SUSPICIOUS FOR MALIGNANT NEOPLASM: ICD-10-CM

## 2018-10-15 DIAGNOSIS — M85.80 OSTEOPENIA, UNSPECIFIED LOCATION: ICD-10-CM

## 2018-10-15 DIAGNOSIS — C50.512 MALIGNANT NEOPLASM OF LOWER-OUTER QUADRANT OF LEFT BREAST OF FEMALE, ESTROGEN RECEPTOR POSITIVE (HCC): Primary | ICD-10-CM

## 2018-10-15 DIAGNOSIS — Z17.0 MALIGNANT NEOPLASM OF LOWER-OUTER QUADRANT OF LEFT BREAST OF FEMALE, ESTROGEN RECEPTOR POSITIVE (HCC): Primary | ICD-10-CM

## 2018-10-15 PROCEDURE — 99214 OFFICE O/P EST MOD 30 MIN: CPT | Performed by: SURGERY

## 2018-10-15 PROCEDURE — G8417 CALC BMI ABV UP PARAM F/U: HCPCS | Performed by: SURGERY

## 2018-10-15 PROCEDURE — 3017F COLORECTAL CA SCREEN DOC REV: CPT | Performed by: SURGERY

## 2018-10-15 PROCEDURE — 4040F PNEUMOC VAC/ADMIN/RCVD: CPT | Performed by: SURGERY

## 2018-10-15 PROCEDURE — 1101F PT FALLS ASSESS-DOCD LE1/YR: CPT | Performed by: SURGERY

## 2018-10-15 PROCEDURE — 1036F TOBACCO NON-USER: CPT | Performed by: SURGERY

## 2018-10-15 PROCEDURE — G8484 FLU IMMUNIZE NO ADMIN: HCPCS | Performed by: SURGERY

## 2018-10-15 PROCEDURE — G8427 DOCREV CUR MEDS BY ELIG CLIN: HCPCS | Performed by: SURGERY

## 2018-10-15 PROCEDURE — G8400 PT W/DXA NO RESULTS DOC: HCPCS | Performed by: SURGERY

## 2018-10-15 PROCEDURE — 1090F PRES/ABSN URINE INCON ASSESS: CPT | Performed by: SURGERY

## 2018-10-15 PROCEDURE — 1123F ACP DISCUSS/DSCN MKR DOCD: CPT | Performed by: SURGERY

## 2018-10-15 ASSESSMENT — ENCOUNTER SYMPTOMS
TROUBLE SWALLOWING: 0
ABDOMINAL DISTENTION: 0
ABDOMINAL PAIN: 0
NAUSEA: 0
BLOOD IN STOOL: 0
CONSTIPATION: 0
COUGH: 0
COLOR CHANGE: 0

## 2018-10-15 NOTE — LETTER
[address] [facility]  [address] [facility]  Phone: [phone]  Fax: [phone]    Patrick Johnson MD        October 16, 2018    MARGARET Asencio CNP  [address], [address]     Patient: Gala Villanueva   MR Number: [id number]   YOB: 1950   Date of Visit: 10/15/2018     Dear Leena Barron,    I recently saw your patient, Gala Villanueva for a follow-up visit. Below are the relevant portions of my assessment and plan of care. 1. Malignant neoplasm of lower-outer quadrant of left breast of female, estrogen receptor positive ([address]    2. Osteopenia, unspecified location    3. Pigmented skin lesion suspicious for malignant neoplasm           1. Continue Arimidex 5 years  2. Medical oncology follow-up with Dr. Winifred Carroll  3. Encourage monthly self breast examinations and yearly breast imaging  4. Breast survivorship plan discussed with patient  5. Follow-up surgical clinic in 1 year  6. Continue calcium and vitamin D supplementation  7. Schedule office procedure for excision of suspicious pigmented skin lesion on her sternum. If you have questions, please do not hesitate to call me. I look forward to following Pillo Guillen along with you.     Sincerely,          Patrick Johnson MD

## 2018-10-15 NOTE — PROGRESS NOTES
fax: [phone]       Imaging Technologist: Jamin Mustafa RT(R)(M), [address]   letter sent: [facility] Doctor Names     [id number]

## 2018-10-16 ASSESSMENT — ENCOUNTER SYMPTOMS
SHORTNESS OF BREATH: 0
EYE PAIN: 0
EYE REDNESS: 0

## 2018-10-22 ENCOUNTER — PROCEDURE VISIT (OUTPATIENT)
Dept: SURGERY | Age: 68
End: 2018-10-22
Payer: MEDICARE

## 2018-10-22 VITALS
HEART RATE: 92 BPM | HEIGHT: 64 IN | BODY MASS INDEX: 25.61 KG/M2 | OXYGEN SATURATION: 96 % | WEIGHT: 150 LBS | TEMPERATURE: 97.5 F | RESPIRATION RATE: 18 BRPM | DIASTOLIC BLOOD PRESSURE: 60 MMHG | SYSTOLIC BLOOD PRESSURE: 132 MMHG

## 2018-10-22 DIAGNOSIS — L98.9 SKIN LESION: Primary | ICD-10-CM

## 2018-10-22 PROCEDURE — 11402 EXC TR-EXT B9+MARG 1.1-2 CM: CPT | Performed by: SURGERY

## 2018-10-22 PROCEDURE — 12031 INTMD RPR S/A/T/EXT 2.5 CM/<: CPT | Performed by: SURGERY

## 2018-10-23 ENCOUNTER — TELEPHONE (OUTPATIENT)
Dept: FAMILY MEDICINE CLINIC | Age: 68
End: 2018-10-23

## 2018-10-23 NOTE — TELEPHONE ENCOUNTER
Patient called asking if she was due for a pnuemonia shot? Advised patient she was not due and series was completed. No further concerns voiced.

## 2018-10-25 ENCOUNTER — TELEPHONE (OUTPATIENT)
Dept: SURGERY | Age: 68
End: 2018-10-25

## 2018-11-05 ENCOUNTER — OFFICE VISIT (OUTPATIENT)
Dept: SURGERY | Age: 68
End: 2018-11-05
Payer: MEDICARE

## 2018-11-05 VITALS
OXYGEN SATURATION: 97 % | WEIGHT: 148.6 LBS | RESPIRATION RATE: 16 BRPM | HEART RATE: 83 BPM | HEIGHT: 64 IN | SYSTOLIC BLOOD PRESSURE: 122 MMHG | DIASTOLIC BLOOD PRESSURE: 70 MMHG | BODY MASS INDEX: 25.37 KG/M2 | TEMPERATURE: 97.5 F

## 2018-11-05 DIAGNOSIS — D23.9 DYSPLASTIC NEVUS OF SKIN: Primary | ICD-10-CM

## 2018-11-05 PROCEDURE — G8417 CALC BMI ABV UP PARAM F/U: HCPCS | Performed by: SURGERY

## 2018-11-05 PROCEDURE — 3017F COLORECTAL CA SCREEN DOC REV: CPT | Performed by: SURGERY

## 2018-11-05 PROCEDURE — 99211 OFF/OP EST MAY X REQ PHY/QHP: CPT | Performed by: SURGERY

## 2018-11-05 PROCEDURE — G8428 CUR MEDS NOT DOCUMENT: HCPCS | Performed by: SURGERY

## 2018-11-06 ASSESSMENT — ENCOUNTER SYMPTOMS: COLOR CHANGE: 0

## 2018-11-06 NOTE — PROGRESS NOTES
(OSCAL) 500 MG TABS tablet Take 500 mg by mouth 2 times daily      Multiple Vitamins-Minerals (MULTIVITAMIN PO) Take 1 tablet by mouth daily       aspirin 325 MG EC tablet Take 1 tablet by mouth daily. 30 tablet 3    acetaminophen 650 MG TABS Take 650 mg by mouth every 4 hours as needed. 120 tablet 3     No current facility-administered medications for this visit. Allergies  No Known Allergies    Family History  Family History   Problem Relation Age of Onset    Cancer Mother    Guadalupe Price Sclerosis Mother     Alcohol Abuse Father     Cancer Father        SocialHistory  Social History     Social History    Marital status:      Spouse name: N/A    Number of children: 3    Years of education: N/A     Occupational History    Not on file. Social History Main Topics    Smoking status: Former Smoker     Packs/day: 0.25     Years: 25.00     Quit date: 1/1/2005    Smokeless tobacco: Never Used    Alcohol use 0.0 oz/week      Comment: social    Drug use: No    Sexual activity: Not on file     Other Topics Concern    Not on file     Social History Narrative    No narrative on file           Review of Systems  Review of Systems   Constitutional: Negative for chills and fever. Skin: Negative for color change and rash. OBJECTIVE     /70 (Site: Right Upper Arm, Position: Sitting, Cuff Size: Medium Adult)   Pulse 83   Temp 97.5 °F (36.4 °C) (Tympanic)   Resp 16   Ht 5' 4\" (1.626 m)   Wt 148 lb 9.6 oz (67.4 kg)   SpO2 97%   Breastfeeding? No   BMI 25.51 kg/m²     Physical Exam   Constitutional: She appears well-developed and well-nourished. HENT:   Head: Normocephalic and atraumatic. Eyes: EOM are normal. No scleral icterus. Neck: No JVD present. Cardiovascular: Normal rate. Pulmonary/Chest: Effort normal. No respiratory distress. Skin: Skin is warm and dry. She is not diaphoretic.             Lab Results   Component Value Date    WBC 7.0 05/17/2018    HGB 13.7 dermis. The melanocytes show evidence of mild cytologic  atypia.  Underlying fibroplasia is seen.  Overall, the findings are  consistent with a dysplastic compound nevus with mild architectural and  cytologic atypia.  Prominent dermal melanophages are also seen.  The  lesion appears completely excised and is not seen at the specimen  edges.  Please correlate with the clinical findings. Viet Hooper                                        <Sign Out Dr. Magda Macias M.D., F.C.A.P.       [facility]/ [address] [facility]  Printed on:  10/24/2018  Luly Julio [facility]  [address] [facility], [facility]  Original print date: 10/24/2018   Lab and Collection     Surgical Pathology on 10/22/2018

## 2018-11-12 DIAGNOSIS — C77.3 CARCINOMA OF LEFT BREAST METASTATIC TO AXILLARY LYMPH NODE (HCC): Primary | ICD-10-CM

## 2018-11-12 DIAGNOSIS — C50.912 CARCINOMA OF LEFT BREAST METASTATIC TO AXILLARY LYMPH NODE (HCC): Primary | ICD-10-CM

## 2018-11-13 ENCOUNTER — OFFICE VISIT (OUTPATIENT)
Dept: ONCOLOGY | Age: 68
End: 2018-11-13
Payer: MEDICARE

## 2018-11-13 ENCOUNTER — HOSPITAL ENCOUNTER (OUTPATIENT)
Dept: INFUSION THERAPY | Age: 68
Discharge: HOME | End: 2018-11-13
Payer: MEDICARE

## 2018-11-13 VITALS
SYSTOLIC BLOOD PRESSURE: 136 MMHG | WEIGHT: 147.4 LBS | TEMPERATURE: 98 F | HEART RATE: 67 BPM | RESPIRATION RATE: 18 BRPM | HEIGHT: 64 IN | OXYGEN SATURATION: 98 % | DIASTOLIC BLOOD PRESSURE: 63 MMHG | BODY MASS INDEX: 25.16 KG/M2

## 2018-11-13 DIAGNOSIS — C77.3 CARCINOMA OF LEFT BREAST METASTATIC TO AXILLARY LYMPH NODE (HCC): ICD-10-CM

## 2018-11-13 DIAGNOSIS — C77.3 CARCINOMA OF LEFT BREAST METASTATIC TO AXILLARY LYMPH NODE (HCC): Primary | ICD-10-CM

## 2018-11-13 DIAGNOSIS — C77.3 BREAST CANCER METASTASIZED TO AXILLARY LYMPH NODE, LEFT (HCC): ICD-10-CM

## 2018-11-13 DIAGNOSIS — C50.912 BREAST CANCER METASTASIZED TO AXILLARY LYMPH NODE, LEFT (HCC): ICD-10-CM

## 2018-11-13 DIAGNOSIS — C50.912 CARCINOMA OF LEFT BREAST METASTATIC TO AXILLARY LYMPH NODE (HCC): Primary | ICD-10-CM

## 2018-11-13 DIAGNOSIS — J90 PLEURAL EFFUSION: ICD-10-CM

## 2018-11-13 DIAGNOSIS — C50.912 CARCINOMA OF LEFT BREAST METASTATIC TO AXILLARY LYMPH NODE (HCC): ICD-10-CM

## 2018-11-13 LAB
ALBUMIN SERPL-MCNC: 4.3 G/DL (ref 3.5–5.1)
ALP BLD-CCNC: 108 U/L (ref 38–126)
ALT SERPL-CCNC: 16 U/L (ref 11–66)
AST SERPL-CCNC: 19 U/L (ref 5–40)
BASINOPHIL, AUTOMATED: 1 % (ref 0–3)
BILIRUB SERPL-MCNC: < 0.2 MG/DL (ref 0.3–1.2)
BILIRUBIN DIRECT: < 0.2 MG/DL (ref 0–0.3)
BUN, WHOLE BLOOD: 15 MG/DL (ref 8–26)
CHLORIDE, WHOLE BLOOD: 98 MEQ/L (ref 98–109)
CREATININE, WHOLE BLOOD: 0.7 MG/DL (ref 0.5–1.2)
EOSINOPHILS RELATIVE PERCENT: 4 % (ref 0–4)
GFR, ESTIMATED: 89 ML/MIN/1.73M2
GLUCOSE, WHOLE BLOOD: 104 MG/DL (ref 70–108)
HCT VFR BLD CALC: 41.5 % (ref 37–47)
HEMOGLOBIN: 14.2 GM/DL (ref 12–16)
IONIZED CALCIUM, WHOLE BLOOD: 1.29 MMOL/L (ref 1.12–1.32)
LYMPHOCYTES # BLD: 33 % (ref 15–47)
MCH RBC QN AUTO: 29.7 PG (ref 27–31)
MCHC RBC AUTO-ENTMCNC: 34.3 GM/DL (ref 33–37)
MCV RBC AUTO: 87 FL (ref 81–99)
MONOCYTES: 8 % (ref 0–12)
PDW BLD-RTO: 11.4 % (ref 11.5–14.5)
PLATELET # BLD: 270 THOU/MM3 (ref 130–400)
PMV BLD AUTO: 7.3 FL (ref 7.4–10.4)
POTASSIUM, WHOLE BLOOD: 4.2 MEQ/L (ref 3.5–4.9)
RBC # BLD: 4.78 MILL/MM3 (ref 4.2–5.4)
SEG NEUTROPHILS: 55 % (ref 43–75)
SODIUM, WHOLE BLOOD: 139 MEQ/L (ref 138–146)
TOTAL CO2, WHOLE BLOOD: 28 MEQ/L (ref 23–33)
TOTAL PROTEIN: 7.2 G/DL (ref 6.1–8)
WBC # BLD: 8.3 THOU/MM3 (ref 4.8–10.8)

## 2018-11-13 PROCEDURE — G8400 PT W/DXA NO RESULTS DOC: HCPCS | Performed by: INTERNAL MEDICINE

## 2018-11-13 PROCEDURE — 80047 BASIC METABLC PNL IONIZED CA: CPT

## 2018-11-13 PROCEDURE — 99214 OFFICE O/P EST MOD 30 MIN: CPT | Performed by: INTERNAL MEDICINE

## 2018-11-13 PROCEDURE — G8427 DOCREV CUR MEDS BY ELIG CLIN: HCPCS | Performed by: INTERNAL MEDICINE

## 2018-11-13 PROCEDURE — 99211 OFF/OP EST MAY X REQ PHY/QHP: CPT

## 2018-11-13 PROCEDURE — 1090F PRES/ABSN URINE INCON ASSESS: CPT | Performed by: INTERNAL MEDICINE

## 2018-11-13 PROCEDURE — G8484 FLU IMMUNIZE NO ADMIN: HCPCS | Performed by: INTERNAL MEDICINE

## 2018-11-13 PROCEDURE — 80076 HEPATIC FUNCTION PANEL: CPT

## 2018-11-13 PROCEDURE — 36415 COLL VENOUS BLD VENIPUNCTURE: CPT

## 2018-11-13 PROCEDURE — 1036F TOBACCO NON-USER: CPT | Performed by: INTERNAL MEDICINE

## 2018-11-13 PROCEDURE — 1123F ACP DISCUSS/DSCN MKR DOCD: CPT | Performed by: INTERNAL MEDICINE

## 2018-11-13 PROCEDURE — 1101F PT FALLS ASSESS-DOCD LE1/YR: CPT | Performed by: INTERNAL MEDICINE

## 2018-11-13 PROCEDURE — 4040F PNEUMOC VAC/ADMIN/RCVD: CPT | Performed by: INTERNAL MEDICINE

## 2018-11-13 PROCEDURE — G8417 CALC BMI ABV UP PARAM F/U: HCPCS | Performed by: INTERNAL MEDICINE

## 2018-11-13 PROCEDURE — 85025 COMPLETE CBC W/AUTO DIFF WBC: CPT

## 2018-11-13 PROCEDURE — 3017F COLORECTAL CA SCREEN DOC REV: CPT | Performed by: INTERNAL MEDICINE

## 2018-11-13 RX ORDER — ANASTROZOLE 1 MG/1
TABLET ORAL
Qty: 90 TABLET | Refills: 3 | Status: SHIPPED | OUTPATIENT
Start: 2018-11-13 | End: 2019-06-25 | Stop reason: SDUPTHER

## 2018-11-19 ENCOUNTER — OFFICE VISIT (OUTPATIENT)
Dept: FAMILY MEDICINE CLINIC | Age: 68
End: 2018-11-19
Payer: MEDICARE

## 2018-11-19 VITALS
WEIGHT: 148.6 LBS | TEMPERATURE: 99.2 F | BODY MASS INDEX: 25.51 KG/M2 | SYSTOLIC BLOOD PRESSURE: 150 MMHG | HEART RATE: 86 BPM | RESPIRATION RATE: 12 BRPM | DIASTOLIC BLOOD PRESSURE: 58 MMHG

## 2018-11-19 DIAGNOSIS — B37.2 CANDIDAL DERMATITIS: Primary | ICD-10-CM

## 2018-11-19 DIAGNOSIS — H01.004 BLEPHARITIS OF LEFT UPPER EYELID, UNSPECIFIED TYPE: ICD-10-CM

## 2018-11-19 PROBLEM — C77.3 BREAST CANCER METASTASIZED TO AXILLARY LYMPH NODE, LEFT (HCC): Status: ACTIVE | Noted: 2018-11-19

## 2018-11-19 PROBLEM — C50.912 BREAST CANCER METASTASIZED TO AXILLARY LYMPH NODE, LEFT (HCC): Status: ACTIVE | Noted: 2018-11-19

## 2018-11-19 PROCEDURE — G8484 FLU IMMUNIZE NO ADMIN: HCPCS | Performed by: NURSE PRACTITIONER

## 2018-11-19 PROCEDURE — 1101F PT FALLS ASSESS-DOCD LE1/YR: CPT | Performed by: NURSE PRACTITIONER

## 2018-11-19 PROCEDURE — G8400 PT W/DXA NO RESULTS DOC: HCPCS | Performed by: NURSE PRACTITIONER

## 2018-11-19 PROCEDURE — 4040F PNEUMOC VAC/ADMIN/RCVD: CPT | Performed by: NURSE PRACTITIONER

## 2018-11-19 PROCEDURE — 1036F TOBACCO NON-USER: CPT | Performed by: NURSE PRACTITIONER

## 2018-11-19 PROCEDURE — 99213 OFFICE O/P EST LOW 20 MIN: CPT | Performed by: NURSE PRACTITIONER

## 2018-11-19 PROCEDURE — 3017F COLORECTAL CA SCREEN DOC REV: CPT | Performed by: NURSE PRACTITIONER

## 2018-11-19 PROCEDURE — G8427 DOCREV CUR MEDS BY ELIG CLIN: HCPCS | Performed by: NURSE PRACTITIONER

## 2018-11-19 PROCEDURE — 1123F ACP DISCUSS/DSCN MKR DOCD: CPT | Performed by: NURSE PRACTITIONER

## 2018-11-19 PROCEDURE — G8417 CALC BMI ABV UP PARAM F/U: HCPCS | Performed by: NURSE PRACTITIONER

## 2018-11-19 PROCEDURE — 1090F PRES/ABSN URINE INCON ASSESS: CPT | Performed by: NURSE PRACTITIONER

## 2018-11-19 RX ORDER — GENTAMICIN SULFATE 3 MG/ML
2 SOLUTION/ DROPS OPHTHALMIC 4 TIMES DAILY
Qty: 1 BOTTLE | Refills: 0 | Status: SHIPPED | OUTPATIENT
Start: 2018-11-19 | End: 2018-11-29

## 2018-11-19 RX ORDER — CEPHALEXIN 500 MG/1
500 CAPSULE ORAL 3 TIMES DAILY
Qty: 30 CAPSULE | Refills: 0 | Status: SHIPPED | OUTPATIENT
Start: 2018-11-19 | End: 2020-01-14 | Stop reason: SDUPTHER

## 2018-11-19 ASSESSMENT — ENCOUNTER SYMPTOMS
COLOR CHANGE: 1
GASTROINTESTINAL NEGATIVE: 1
RESPIRATORY NEGATIVE: 1
EYE REDNESS: 1

## 2018-11-19 NOTE — PROGRESS NOTES
Pulse: 86   Resp: 12   Temp: 99.2 °F (37.3 °C)   TempSrc: Temporal   Weight: 148 lb 9.6 oz (67.4 kg)       Physical Exam   Constitutional: She is oriented to person, place, and time. She appears well-developed and well-nourished. HENT:   Head: Normocephalic. Right Ear: Tympanic membrane and external ear normal.   Left Ear: Tympanic membrane and external ear normal.   Nose: Nose normal.   Mouth/Throat: Oropharynx is clear and moist.   Eyes:       Neck: Normal range of motion. Neck supple. Cardiovascular: Normal rate, regular rhythm, normal heart sounds and intact distal pulses. Exam reveals no gallop and no friction rub. No murmur heard. Pulmonary/Chest: Effort normal and breath sounds normal. She has no wheezes. She has no rales. Abdominal: Soft. Bowel sounds are normal. There is no tenderness. There is no guarding. Musculoskeletal: Normal range of motion. Lymphadenopathy:     She has no cervical adenopathy. Neurological: She is alert and oriented to person, place, and time. She has normal reflexes. Skin: Skin is warm. Psychiatric: She has a normal mood and affect. Assessment:      Diagnosis Orders   1. Candidal dermatitis  nystatin-triamcinolone (MYCOLOG II) 766041-3.1 UNIT/GM-% cream   2. Blepharitis of left upper eyelid, unspecified type  cephALEXin (KEFLEX) 500 MG capsule    gentamicin (GARAMYCIN) 0.3 % ophthalmic solution       Plan:      No Follow-up on file. No orders of the defined types were placed in this encounter. Orders Placed This Encounter   Medications    nystatin-triamcinolone (MYCOLOG II) 598528-8.3 UNIT/GM-% cream     Sig: Apply topically 4 times daily.      Dispense:  30 g     Refill:  1    cephALEXin (KEFLEX) 500 MG capsule     Sig: Take 1 capsule by mouth 3 times daily for 10 days     Dispense:  30 capsule     Refill:  0    gentamicin (GARAMYCIN) 0.3 % ophthalmic solution     Sig: Place 2 drops into the left eye 4 times daily for 10 days

## 2018-11-20 NOTE — PROGRESS NOTES
splenomegaly. Musculoskeletal: Gait is normal. Muscle strength and tone good. Neurological: Alert and oriented to person, place, and time. Judgment and thought content normal.  Skin: Skin is warm and dry. No rash. Psychiatric: Mood and affect appropriate for the clinical situation. Behavior is normal.         Data Analysis:    Hematology 11/13/2018 5/17/2018 11/16/2017   WBC 8.3 7.0 7.5   RBC 4.78 4.55 4.48   HGB 14.2 13.7 13.5   HCT 41.5 41.0 40.3   MCV 87 90 90   RDW 11.4 (L) 11.8 11.5    265 274     Assessment:   1. Breast cancer. 2.  Arimidex therapy. 3.  History of unexplained pleural effusion. Plan:   1. Continue Arimidex 1 mg tablet by mouth daily. 2.  Monitor for recurrence of malignancy. 3.  Continue annual mammogram.  4.  Monitor for side effects and toxicity from Arimidex. 5.  Monitor for recurrent pleural effusion. Brenden Maloney M.D.   [facility] [address]  [address] [facility], [address] [place] [address], [address] [place] [address]

## 2018-12-17 RX ORDER — FLECAINIDE ACETATE 50 MG/1
TABLET ORAL
Qty: 180 TABLET | Refills: 1 | Status: SHIPPED | OUTPATIENT
Start: 2018-12-17 | End: 2019-03-11 | Stop reason: SDUPTHER

## 2019-02-27 ENCOUNTER — OFFICE VISIT (OUTPATIENT)
Dept: FAMILY MEDICINE CLINIC | Age: 69
End: 2019-02-27
Payer: MEDICARE

## 2019-02-27 VITALS
SYSTOLIC BLOOD PRESSURE: 140 MMHG | HEIGHT: 64 IN | HEART RATE: 76 BPM | DIASTOLIC BLOOD PRESSURE: 72 MMHG | WEIGHT: 151.6 LBS | BODY MASS INDEX: 25.88 KG/M2 | RESPIRATION RATE: 12 BRPM

## 2019-02-27 DIAGNOSIS — M25.562 ARTHRALGIA OF LEFT KNEE: Primary | ICD-10-CM

## 2019-02-27 DIAGNOSIS — C77.3 CARCINOMA OF LEFT BREAST METASTATIC TO AXILLARY LYMPH NODE (HCC): ICD-10-CM

## 2019-02-27 DIAGNOSIS — I48.0 PAROXYSMAL A-FIB (HCC): ICD-10-CM

## 2019-02-27 DIAGNOSIS — C50.912 CARCINOMA OF LEFT BREAST METASTATIC TO AXILLARY LYMPH NODE (HCC): ICD-10-CM

## 2019-02-27 PROCEDURE — 1090F PRES/ABSN URINE INCON ASSESS: CPT | Performed by: NURSE PRACTITIONER

## 2019-02-27 PROCEDURE — G8417 CALC BMI ABV UP PARAM F/U: HCPCS | Performed by: NURSE PRACTITIONER

## 2019-02-27 PROCEDURE — 1036F TOBACCO NON-USER: CPT | Performed by: NURSE PRACTITIONER

## 2019-02-27 PROCEDURE — 99213 OFFICE O/P EST LOW 20 MIN: CPT | Performed by: NURSE PRACTITIONER

## 2019-02-27 PROCEDURE — 4040F PNEUMOC VAC/ADMIN/RCVD: CPT | Performed by: NURSE PRACTITIONER

## 2019-02-27 PROCEDURE — 1123F ACP DISCUSS/DSCN MKR DOCD: CPT | Performed by: NURSE PRACTITIONER

## 2019-02-27 PROCEDURE — G8427 DOCREV CUR MEDS BY ELIG CLIN: HCPCS | Performed by: NURSE PRACTITIONER

## 2019-02-27 PROCEDURE — 1101F PT FALLS ASSESS-DOCD LE1/YR: CPT | Performed by: NURSE PRACTITIONER

## 2019-02-27 PROCEDURE — 3017F COLORECTAL CA SCREEN DOC REV: CPT | Performed by: NURSE PRACTITIONER

## 2019-02-27 PROCEDURE — G8400 PT W/DXA NO RESULTS DOC: HCPCS | Performed by: NURSE PRACTITIONER

## 2019-02-27 PROCEDURE — G8482 FLU IMMUNIZE ORDER/ADMIN: HCPCS | Performed by: NURSE PRACTITIONER

## 2019-02-27 RX ORDER — MELOXICAM 7.5 MG/1
7.5 TABLET ORAL DAILY PRN
Qty: 90 TABLET | Refills: 1 | Status: SHIPPED | OUTPATIENT
Start: 2019-02-27

## 2019-02-27 ASSESSMENT — ENCOUNTER SYMPTOMS
EYES NEGATIVE: 1
GASTROINTESTINAL NEGATIVE: 1
RESPIRATORY NEGATIVE: 1

## 2019-02-27 ASSESSMENT — PATIENT HEALTH QUESTIONNAIRE - PHQ9
1. LITTLE INTEREST OR PLEASURE IN DOING THINGS: 0
SUM OF ALL RESPONSES TO PHQ QUESTIONS 1-9: 0
SUM OF ALL RESPONSES TO PHQ9 QUESTIONS 1 & 2: 0
2. FEELING DOWN, DEPRESSED OR HOPELESS: 0
SUM OF ALL RESPONSES TO PHQ QUESTIONS 1-9: 0

## 2019-03-05 ENCOUNTER — HOSPITAL ENCOUNTER (OUTPATIENT)
Dept: GENERAL RADIOLOGY | Age: 69
Discharge: HOME | End: 2019-03-05
Payer: MEDICARE

## 2019-03-05 ENCOUNTER — HOSPITAL ENCOUNTER (OUTPATIENT)
Age: 69
Discharge: HOME | End: 2019-03-05
Payer: MEDICARE

## 2019-03-05 DIAGNOSIS — M25.562 ARTHRALGIA OF LEFT KNEE: ICD-10-CM

## 2019-03-05 PROCEDURE — 73564 X-RAY EXAM KNEE 4 OR MORE: CPT

## 2019-03-11 ENCOUNTER — OFFICE VISIT (OUTPATIENT)
Dept: CARDIOLOGY CLINIC | Age: 69
End: 2019-03-11
Payer: MEDICARE

## 2019-03-11 VITALS
HEART RATE: 82 BPM | WEIGHT: 154 LBS | HEIGHT: 64 IN | BODY MASS INDEX: 26.29 KG/M2 | SYSTOLIC BLOOD PRESSURE: 154 MMHG | DIASTOLIC BLOOD PRESSURE: 69 MMHG

## 2019-03-11 DIAGNOSIS — I48.0 PAF (PAROXYSMAL ATRIAL FIBRILLATION) (HCC): Primary | ICD-10-CM

## 2019-03-11 DIAGNOSIS — Z98.890 S/P CARDIAC CATH: ICD-10-CM

## 2019-03-11 DIAGNOSIS — R94.31 ABNORMAL EKG: ICD-10-CM

## 2019-03-11 PROBLEM — R06.02 SOB (SHORTNESS OF BREATH) ON EXERTION: Status: RESOLVED | Noted: 2017-06-12 | Resolved: 2019-03-11

## 2019-03-11 PROBLEM — R00.2 INTERMITTENT PALPITATIONS: Status: RESOLVED | Noted: 2017-07-03 | Resolved: 2019-03-11

## 2019-03-11 PROCEDURE — 99214 OFFICE O/P EST MOD 30 MIN: CPT | Performed by: INTERNAL MEDICINE

## 2019-03-11 PROCEDURE — 93000 ELECTROCARDIOGRAM COMPLETE: CPT | Performed by: INTERNAL MEDICINE

## 2019-03-11 PROCEDURE — G8428 CUR MEDS NOT DOCUMENT: HCPCS | Performed by: INTERNAL MEDICINE

## 2019-03-11 PROCEDURE — G8400 PT W/DXA NO RESULTS DOC: HCPCS | Performed by: INTERNAL MEDICINE

## 2019-03-11 PROCEDURE — G8482 FLU IMMUNIZE ORDER/ADMIN: HCPCS | Performed by: INTERNAL MEDICINE

## 2019-03-11 PROCEDURE — 4040F PNEUMOC VAC/ADMIN/RCVD: CPT | Performed by: INTERNAL MEDICINE

## 2019-03-11 PROCEDURE — 1123F ACP DISCUSS/DSCN MKR DOCD: CPT | Performed by: INTERNAL MEDICINE

## 2019-03-11 PROCEDURE — 3017F COLORECTAL CA SCREEN DOC REV: CPT | Performed by: INTERNAL MEDICINE

## 2019-03-11 PROCEDURE — 1036F TOBACCO NON-USER: CPT | Performed by: INTERNAL MEDICINE

## 2019-03-11 PROCEDURE — 1101F PT FALLS ASSESS-DOCD LE1/YR: CPT | Performed by: INTERNAL MEDICINE

## 2019-03-11 PROCEDURE — G8417 CALC BMI ABV UP PARAM F/U: HCPCS | Performed by: INTERNAL MEDICINE

## 2019-03-11 PROCEDURE — 1090F PRES/ABSN URINE INCON ASSESS: CPT | Performed by: INTERNAL MEDICINE

## 2019-03-11 RX ORDER — FLECAINIDE ACETATE 50 MG/1
TABLET ORAL
Qty: 180 TABLET | Refills: 3 | Status: SHIPPED | OUTPATIENT
Start: 2019-03-11 | End: 2019-04-17

## 2019-03-11 RX ORDER — DILTIAZEM HYDROCHLORIDE 120 MG/1
120 CAPSULE, COATED, EXTENDED RELEASE ORAL DAILY
Qty: 90 CAPSULE | Refills: 3 | Status: SHIPPED | OUTPATIENT
Start: 2019-03-11 | End: 2019-03-19 | Stop reason: SDUPTHER

## 2019-03-19 RX ORDER — DILTIAZEM HYDROCHLORIDE 120 MG/1
120 CAPSULE, COATED, EXTENDED RELEASE ORAL DAILY
Qty: 7 CAPSULE | Refills: 0 | OUTPATIENT
Start: 2019-03-19 | End: 2020-03-24

## 2019-03-27 ENCOUNTER — HOSPITAL ENCOUNTER (OUTPATIENT)
Dept: NON INVASIVE DIAGNOSTICS | Age: 69
Discharge: HOME | End: 2019-03-27
Payer: MEDICARE

## 2019-03-27 DIAGNOSIS — R94.31 ABNORMAL EKG: ICD-10-CM

## 2019-03-27 DIAGNOSIS — Z98.890 S/P CARDIAC CATH: ICD-10-CM

## 2019-03-27 DIAGNOSIS — I48.0 PAF (PAROXYSMAL ATRIAL FIBRILLATION) (HCC): ICD-10-CM

## 2019-03-27 LAB
LV EF: 45 %
LVEF MODALITY: NORMAL

## 2019-03-27 PROCEDURE — 93306 TTE W/DOPPLER COMPLETE: CPT

## 2019-04-17 ENCOUNTER — OFFICE VISIT (OUTPATIENT)
Dept: CARDIOLOGY CLINIC | Age: 69
End: 2019-04-17
Payer: MEDICARE

## 2019-04-17 VITALS
HEIGHT: 64 IN | DIASTOLIC BLOOD PRESSURE: 72 MMHG | BODY MASS INDEX: 25.95 KG/M2 | WEIGHT: 152 LBS | SYSTOLIC BLOOD PRESSURE: 138 MMHG | HEART RATE: 63 BPM

## 2019-04-17 DIAGNOSIS — I42.8 CARDIOMYOPATHY, NONISCHEMIC (HCC): Primary | ICD-10-CM

## 2019-04-17 DIAGNOSIS — R06.02 SOB (SHORTNESS OF BREATH) ON EXERTION: ICD-10-CM

## 2019-04-17 DIAGNOSIS — R94.31 ABNORMAL EKG: ICD-10-CM

## 2019-04-17 DIAGNOSIS — I48.0 PAF (PAROXYSMAL ATRIAL FIBRILLATION) (HCC): ICD-10-CM

## 2019-04-17 DIAGNOSIS — Z98.890 S/P CARDIAC CATH: ICD-10-CM

## 2019-04-17 DIAGNOSIS — I44.7 LBBB (LEFT BUNDLE BRANCH BLOCK): ICD-10-CM

## 2019-04-17 PROCEDURE — 1036F TOBACCO NON-USER: CPT | Performed by: INTERNAL MEDICINE

## 2019-04-17 PROCEDURE — 93000 ELECTROCARDIOGRAM COMPLETE: CPT | Performed by: INTERNAL MEDICINE

## 2019-04-17 PROCEDURE — G8400 PT W/DXA NO RESULTS DOC: HCPCS | Performed by: INTERNAL MEDICINE

## 2019-04-17 PROCEDURE — 99214 OFFICE O/P EST MOD 30 MIN: CPT | Performed by: INTERNAL MEDICINE

## 2019-04-17 PROCEDURE — G8417 CALC BMI ABV UP PARAM F/U: HCPCS | Performed by: INTERNAL MEDICINE

## 2019-04-17 PROCEDURE — G8427 DOCREV CUR MEDS BY ELIG CLIN: HCPCS | Performed by: INTERNAL MEDICINE

## 2019-04-17 PROCEDURE — 4040F PNEUMOC VAC/ADMIN/RCVD: CPT | Performed by: INTERNAL MEDICINE

## 2019-04-17 PROCEDURE — 1090F PRES/ABSN URINE INCON ASSESS: CPT | Performed by: INTERNAL MEDICINE

## 2019-04-17 PROCEDURE — 3017F COLORECTAL CA SCREEN DOC REV: CPT | Performed by: INTERNAL MEDICINE

## 2019-04-17 PROCEDURE — 1123F ACP DISCUSS/DSCN MKR DOCD: CPT | Performed by: INTERNAL MEDICINE

## 2019-04-17 NOTE — PROGRESS NOTES
Years of education: Not on file    Highest education level: Not on file   Occupational History    Not on file   Social Needs    Financial resource strain: Not on file    Food insecurity:     Worry: Not on file     Inability: Not on file    Transportation needs:     Medical: Not on file     Non-medical: Not on file   Tobacco Use    Smoking status: Former Smoker     Packs/day: 0.25     Years: 25.00     Pack years: 6.25     Last attempt to quit: 2005     Years since quittin.2    Smokeless tobacco: Never Used   Substance and Sexual Activity    Alcohol use: Yes     Alcohol/week: 0.0 oz     Comment: social    Drug use: No    Sexual activity: Not on file   Lifestyle    Physical activity:     Days per week: Not on file     Minutes per session: Not on file    Stress: Not on file   Relationships    Social connections:     Talks on phone: Not on file     Gets together: Not on file     Attends [employer] service: Not on file     Active member of club or organization: Not on file     Attends meetings of clubs or organizations: Not on file     Relationship status: Not on file    Intimate partner violence:     Fear of current or ex partner: Not on file     Emotionally abused: Not on file     Physically abused: Not on file     Forced sexual activity: Not on file   Other Topics Concern    Not on file   Social History Narrative    Not on file       Current Outpatient Prescriptions   Medication Sig Dispense Refill    calcium carbonate (OSCAL) 500 MG TABS tablet Take 500 mg by mouth 2 times daily      anastrozole (ARIMIDEX) 1 MG tablet take 1 tablet by mouth once daily REQUEST FOR 90 DAY RX 90 tablet 3    diltiazem (CARDIZEM SR) 120 MG SR capsule Take 1 capsule by mouth daily 60 capsule 3    flecainide (TAMBOCOR) 50 MG tablet TAKE 1 TABLET TWICE A  tablet 3    Multiple Vitamins-Minerals (MULTIVITAMIN PO) Take  by mouth daily.  aspirin 325 MG EC tablet Take 1 tablet by mouth daily.  30 tablet 3 139 11/13/2018     07/18/2017    K 4.2 11/13/2018    K 4.2 07/18/2017     07/18/2017    CO2 28 07/18/2017    BUN 14 07/18/2017    LABALBU 4.3 11/13/2018    CREATININE 0.7 11/13/2018    CREATININE 0.6 07/18/2017    CALCIUM 9.4 07/18/2017    LABGLOM >90 07/18/2017    GLUCOSE 86 07/18/2017     Hepatic Function Panel:    Lab Results   Component Value Date    ALKPHOS 108 11/13/2018    ALT 16 11/13/2018    AST 19 11/13/2018    PROT 7.2 11/13/2018    BILITOT <0.2 11/13/2018    BILIDIR <0.2 11/13/2018    LABALBU 4.3 11/13/2018     Magnesium:    Lab Results   Component Value Date    MG 2.1 11/17/2014     Warfarin PT/INR:    No components found for: PTPATWAR,  PTINRWAR  HgBA1c:    No results found for: LABA1C  FLP:    Lab Results   Component Value Date    TRIG 67 04/02/2018     04/02/2018    LDLCALC 100 04/02/2018     TSH:    Lab Results   Component Value Date    TSH 1.080 11/12/2014       EKG 12/4/14-Sinus  Rhythm   -  Nonspecific T-abnormality. ABNORMAL  msec    SUMMARY:    Left ventricle:  Size was normal.  Systolic function was normal. Ejection fraction was estimated in the range   of 55 % to 65 %. There were no regional wall motion abnormalities. Left atrium:  The atrium was mildly dilated. Pericardium:  A small, loculated pericardial effusion was identified circumferential to   the heart. The fluid had no internal echoes. There was no evidence of hemodynamic compromise. COMPARISONS:  Comparison was made with the previous study of 12-Nov-2014. Pericardial   effusion appearance has not changed. Prepared and signed by    Cosme Swain MD    EKG 3/3./15-Sinus  Rhythm    -  Nonspecific T-abnormality. No acute changes  ABNORMAL   msec    EKG 7/7/15-  Sinus  Rhythm   -  Negative precordial T-waves. WITHIN NORMAL LIMITS    EKG 12/7/15  Sinus  Rhythm   -  Nonspecific T-abnormality.   QTC  msec  ABNORMAL     EKG 6/12/17  NSR, prwp, no acute changed  QTC WNL [facility] EKG stress test is not suggestive for ischemia.   Calculated gated LVEF 69 %.   The T.I.D. ratio was 1.3 .   There was a small sized, mildly severe, reversible myocardial perfusion   defect of the apex and distal anterior wall.   The nuclear images is suggestive for mild myocardial ischemia in the apex   and distal segment of anterior wall.      Recommendation   Clinical correlation is recommended.      Signatures      ----------------------------------------------------------------   Electronically signed by Judi Jerez MD (Interpreting   Cardiologist) on 06/26/2017 at 19:33   ----------------------------------------------------------------    ekg  9/10/18  Sinus  Rhythm   WITHIN NORMAL LIMITS   msec        Event monitor  CONCLUSION:  This is a benign event monitor finding with normal sinus  rhythm. No evidence of atrial fibrillation. Heart rate ranging from 72 to  100 beats per minute. No other form of arrhythmia noted.     For sure, there is no evidence of atrial fibrillation.      CHUY Tejeda M.D.     D: 04/15/2018 13:04:28    EKG 3/11/19  NSR, LBBB  The LBBB is new compared to ekg sept 2018  No hx of cp     MSEC    EKG 4/17/19  Sinus  Rhythm   -  Nonspecific T-abnormality.    ABNORMAL   No more LBBB      Conclusions      Summary   Left ventricle size is normal.   Normal left ventricular wall thickness.   There was moderate global hypokinesis of the left ventricle.   Systolic function was moderately reduced.   Ejection fraction is visually estimated at 45%.   Doppler parameters were consistent with abnormal left ventricular   relaxation (grade 1 diastolic dysfunction).   Doppler parameters were consistent with abnormal left ventricular   relaxation (grade 1 diastolic dysfunction).   The left atrium is Mildly dilated.   Moderate mitral regurgitation is present.      Signature      ----------------------------------------------------------------   Electronically signed by Justo Georges MD (Interpreting  [phone]) on 03/27/2019 at 07:02 PM    Assessment     Diagnosis Orders   1. Cardiomyopathy, probable nonischemic (HCC)  Stress test, lexiscan   2. SOB (shortness of breath) on exertion  Stress test, lexiscan   3. PAF (paroxysmal atrial fibrillation) (HCC)  Stress test, lexiscan   4. LBBB (left bundle branch block)  Stress test, lexiscan   5. Abnormal EKG  Stress test, lexiscan   6. S/P cardiac cath- 7/18/17- LM-P, LAD MID AND DISTAL 30%, LCX SMALL PATENT , RCA LARGED PATENT, EDP 5 MMHG, EF 60%- MD RX  Stress test, lexiscan         Previous admission DX  New onset AFB: CVR now Less than 24 hours now IN NSR  CHADS2= 0  TSH, potassium and mag normal No Hx TEMI    Now moderate pericardial effusion by CT done this am  Small pericardial effusion by echo    EKG changes: new T wave inversions leads V4-V6  - trend top      Pleural effusion: s\p Lt thoracentesis 11-16 with 225 ml off    viral syndrome- with possible pericarditis and pleurisy    possible pericarditis and pleurisy    Hx breast CA- s\p lumpectomy and chemo/radiation    Atypical CP upon admit- resolved now      Plan     Still in nSR  BuT NEW ABN EKG LBBB and eveloving  NEED ECHO  NO CP   Sob on exertion      Cardiomyopathy: improving, no CHF symptoms, no change in clinical condition. Will need periodic echocardiograms depending on symptoms. LBBB new and now resolved and EF 45% new drop from 60% prior  No cp  Sob on exertion not new per pat  Stop felcanide 50 bid  lexiscan nuc stress  Start lopressor 25 po bid      Continue the current treatment and with constant vigilance to changes in symptoms and also any potential side effects. Return for care or seek medical attention immediately if symptoms got worse and/or develop new symptoms.     Limited echo revealed stable small pericardial effusion- resolved  Completed months of colchicine      PAF 2015 and remain in NSR for over one yr- post chemo and radiation  chads of 0, chads vasc 2 ( age and female)  EKG NSR Normal QTC  Rate control for now and consider rhythm control down the line  Cont.  Cardizem cd 120 qd and flecanide  Remain in NSR for over one yr and will cont asa rather   OA considered and does not want it now  Check event mionitor_ revealed NSR  Cont asa 325 po qd  Any recurrence of atr fib she need OA    Need rhythm control and   Stop  Flecanide 50 BID  qtc wnl    HOME BP ADVISED    D/w the pat the plan of care    Echo and ekg reviewed and doing well    RTC in 4 months    Ty Count includes the [facility]

## 2019-04-29 ENCOUNTER — HOSPITAL ENCOUNTER (OUTPATIENT)
Dept: WOMENS IMAGING | Age: 69
Discharge: HOME | End: 2019-04-29
Payer: MEDICARE

## 2019-04-29 DIAGNOSIS — Z85.3 HISTORY OF BREAST CANCER: ICD-10-CM

## 2019-04-29 PROCEDURE — 77063 BREAST TOMOSYNTHESIS BI: CPT

## 2019-05-07 ENCOUNTER — HOSPITAL ENCOUNTER (OUTPATIENT)
Dept: NON INVASIVE DIAGNOSTICS | Age: 69
Discharge: HOME | End: 2019-05-07
Payer: MEDICARE

## 2019-05-07 DIAGNOSIS — I42.8 CARDIOMYOPATHY, NONISCHEMIC (HCC): ICD-10-CM

## 2019-05-07 DIAGNOSIS — R94.31 ABNORMAL EKG: ICD-10-CM

## 2019-05-07 DIAGNOSIS — R06.02 SOB (SHORTNESS OF BREATH) ON EXERTION: ICD-10-CM

## 2019-05-07 DIAGNOSIS — I44.7 LBBB (LEFT BUNDLE BRANCH BLOCK): ICD-10-CM

## 2019-05-07 DIAGNOSIS — I48.0 PAF (PAROXYSMAL ATRIAL FIBRILLATION) (HCC): ICD-10-CM

## 2019-05-07 DIAGNOSIS — Z98.890 S/P CARDIAC CATH: ICD-10-CM

## 2019-05-07 PROCEDURE — 6360000002 HC RX W HCPCS

## 2019-05-07 PROCEDURE — 3430000000 HC RX DIAGNOSTIC RADIOPHARMACEUTICAL: Performed by: INTERNAL MEDICINE

## 2019-05-07 PROCEDURE — A9500 TC99M SESTAMIBI: HCPCS | Performed by: INTERNAL MEDICINE

## 2019-05-07 PROCEDURE — 93017 CV STRESS TEST TRACING ONLY: CPT

## 2019-05-07 PROCEDURE — 2709999900 HC NON-CHARGEABLE SUPPLY

## 2019-05-07 PROCEDURE — 78452 HT MUSCLE IMAGE SPECT MULT: CPT

## 2019-05-07 RX ADMIN — Medication 34.1 MILLICURIE: at 12:05

## 2019-05-07 RX ADMIN — Medication 9.6 MILLICURIE: at 11:20

## 2019-05-08 ENCOUNTER — TELEPHONE (OUTPATIENT)
Dept: CARDIOLOGY CLINIC | Age: 69
End: 2019-05-08

## 2019-05-08 NOTE — TELEPHONE ENCOUNTER
LM for pt to return call. Pt had an abnormal stress test. Schedule pt 5/9/19 at 1130AM with Dr. Zina Casey if pt can make it at that time.

## 2019-05-09 ENCOUNTER — OFFICE VISIT (OUTPATIENT)
Dept: CARDIOLOGY CLINIC | Age: 69
End: 2019-05-09
Payer: MEDICARE

## 2019-05-09 VITALS
SYSTOLIC BLOOD PRESSURE: 139 MMHG | HEIGHT: 64 IN | DIASTOLIC BLOOD PRESSURE: 63 MMHG | BODY MASS INDEX: 25.64 KG/M2 | HEART RATE: 61 BPM | WEIGHT: 150.2 LBS

## 2019-05-09 DIAGNOSIS — R94.39 ABNORMAL NUCLEAR STRESS TEST: ICD-10-CM

## 2019-05-09 DIAGNOSIS — I44.7 LBBB (LEFT BUNDLE BRANCH BLOCK): ICD-10-CM

## 2019-05-09 DIAGNOSIS — R94.31 ABNORMAL EKG: ICD-10-CM

## 2019-05-09 DIAGNOSIS — I48.0 PAF (PAROXYSMAL ATRIAL FIBRILLATION) (HCC): Primary | ICD-10-CM

## 2019-05-09 DIAGNOSIS — R06.02 SOB (SHORTNESS OF BREATH) ON EXERTION: ICD-10-CM

## 2019-05-09 DIAGNOSIS — Z98.890 S/P CARDIAC CATH: ICD-10-CM

## 2019-05-09 DIAGNOSIS — I42.8 CARDIOMYOPATHY, NONISCHEMIC (HCC): ICD-10-CM

## 2019-05-09 PROCEDURE — 1036F TOBACCO NON-USER: CPT | Performed by: INTERNAL MEDICINE

## 2019-05-09 PROCEDURE — 3017F COLORECTAL CA SCREEN DOC REV: CPT | Performed by: INTERNAL MEDICINE

## 2019-05-09 PROCEDURE — G8400 PT W/DXA NO RESULTS DOC: HCPCS | Performed by: INTERNAL MEDICINE

## 2019-05-09 PROCEDURE — 4040F PNEUMOC VAC/ADMIN/RCVD: CPT | Performed by: INTERNAL MEDICINE

## 2019-05-09 PROCEDURE — G8417 CALC BMI ABV UP PARAM F/U: HCPCS | Performed by: INTERNAL MEDICINE

## 2019-05-09 PROCEDURE — 99214 OFFICE O/P EST MOD 30 MIN: CPT | Performed by: INTERNAL MEDICINE

## 2019-05-09 PROCEDURE — 1090F PRES/ABSN URINE INCON ASSESS: CPT | Performed by: INTERNAL MEDICINE

## 2019-05-09 PROCEDURE — 1123F ACP DISCUSS/DSCN MKR DOCD: CPT | Performed by: INTERNAL MEDICINE

## 2019-05-09 PROCEDURE — G8427 DOCREV CUR MEDS BY ELIG CLIN: HCPCS | Performed by: INTERNAL MEDICINE

## 2019-05-09 NOTE — PROGRESS NOTES
Chief Complaint   Patient presents with    Check-Up    Cardiomyopathy    Atrial Fibrillation   Originally Seen and evaluated in the hospital for for PNA and small pericardial effusionm  And discharged   Came today for F/U      Abnormal stress test f/u. Rare episodes of dizziness at rest    NO  Palpitations    Chronic sob on exertion stairs- stable no wrosening    No cp      Denies chest pain, palpitations,  dizziness, edema    Patient Seen, Chart, Consults notes, Labs, Radiology studies reviewed.         Patient Active Problem List   Diagnosis    Breast cancer metastasized to axillary lymph node ([facility] [address]    Encounter for antineoplastic chemotherapy    Anemia    Leukopenia    Encounter for care related to Port-a-Cath    Febrile illness, acute    Pneumonia    Pleural effusion    Paroxysmal A-fib (HCC)    Abnormal EKG    Elevated alkaline phosphatase level    Generalized weakness    Leukocytosis    Weakness    Use of anastrozole (Arimidex)    PAF (paroxysmal atrial fibrillation) (HCC)    Bilateral leg edema- dependant Trace    S/P cardiac cath- 7/18/17- LM-P, LAD MID AND DISTAL 30%, LCX SMALL PATENT , RCA LARGED PATENT, EDP 5 MMHG, EF 60%- MD RX    Breast cancer metastasized to axillary lymph node, left (HCC)    LBBB (left bundle branch block)    SOB (shortness of breath) on exertion    Cardiomyopathy, probable nonischemic (HCC)    Abnormal nuclear stress test- poor image quaility mild ischemia probvaly artifact related - no cp- med RX       Past Surgical History:   Procedure Laterality Date    BREAST LUMPECTOMY Left 06/26/2014    Needle Loc x2 with Axillary node dissection    BUNIONECTOMY      CARPAL TUNNEL RELEASE Right 11/28/2016    COLONOSCOPY  12/02/2016    ROTATOR CUFF REPAIR Right 08/03/2017        TONSILLECTOMY AND ADENOIDECTOMY Bilateral        No Known Allergies     Family History   Problem Relation Age of Onset    Cancer Mother    Diana Ally Sclerosis Mother     Alcohol TWICE A  tablet 3    Multiple Vitamins-Minerals (MULTIVITAMIN PO) Take  by mouth daily.  aspirin 325 MG EC tablet Take 1 tablet by mouth daily. 30 tablet 3    acetaminophen 650 MG TABS Take 650 mg by mouth every 4 hours as needed. 120 tablet 3     No current facility-administered medications for this visit. Review of Systems -     General ROS: negative  Psychological ROS: negative  Hematological and Lymphatic ROS: No history of blood clots or bleeding disorder. Respiratory ROS: no cough, shortness of breath, or wheezing  Cardiovascular ROS: no chest pain or dyspnea on exertion  Gastrointestinal ROS: negative  Genito-Urinary ROS: no dysuria, trouble voiding, or hematuria  Musculoskeletal ROS: negative  Neurological ROS: no TIA or stroke symptoms  Dermatological ROS: negative      Blood pressure 139/63, pulse 61, height 5' 4\" (1.626 m), weight 150 lb 3.2 oz (68.1 kg), not currently breastfeeding. Physical Examination:    General appearance - alert, well appearing, and in no distress  Mental status - alert, oriented to person, place, and time  Neck - supple, no significant adenopathy, no JVD, or carotid bruits  Chest - clear to auscultation, no wheezes, rales or rhonchi, symmetric air entry  Heart - normal rate, regular rhythm, normal S1, S2, no murmurs, rubs, clicks or gallops  Abdomen - soft, nontender, nondistended, no masses or organomegaly  Neurological - alert, oriented, normal speech, no focal findings or movement disorder noted  Musculoskeletal - no joint tenderness, deformity or swelling  Extremities - peripheral pulses normal, no pedal edema, no clubbing or cyanosis  Skin - normal coloration and turgor, no rashes, no suspicious skin lesions noted    Lab  No results for input(s): CKTOTAL, CKMB, CKMBINDEX, TROPONINI in the last 72 hours.   CBC:   Lab Results   Component Value Date    WBC 8.3 11/13/2018    RBC 4.78 11/13/2018    HGB 14.2 11/13/2018    HCT 41.5 11/13/2018    MCV 87 11/13/2018    MCH 29.7 11/13/2018    MCHC 34.3 11/13/2018    RDW 11.4 11/13/2018     11/13/2018    MPV 7.3 11/13/2018     BMP:    Lab Results   Component Value Date     11/13/2018     07/18/2017    K 4.2 11/13/2018    K 4.2 07/18/2017     07/18/2017    CO2 28 07/18/2017    BUN 14 07/18/2017    LABALBU 4.3 11/13/2018    CREATININE 0.7 11/13/2018    CREATININE 0.6 07/18/2017    CALCIUM 9.4 07/18/2017    LABGLOM >90 07/18/2017    GLUCOSE 86 07/18/2017     Hepatic Function Panel:    Lab Results   Component Value Date    ALKPHOS 108 11/13/2018    ALT 16 11/13/2018    AST 19 11/13/2018    PROT 7.2 11/13/2018    BILITOT <0.2 11/13/2018    BILIDIR <0.2 11/13/2018    LABALBU 4.3 11/13/2018     Magnesium:    Lab Results   Component Value Date    MG 2.1 11/17/2014     Warfarin PT/INR:    No components found for: PTPATWAR,  PTINRWAR  HgBA1c:    No results found for: LABA1C  FLP:    Lab Results   Component Value Date    TRIG 67 04/02/2018     04/02/2018    LDLCALC 100 04/02/2018     TSH:    Lab Results   Component Value Date    TSH 1.080 11/12/2014       EKG 12/4/14-Sinus  Rhythm   -  Nonspecific T-abnormality. ABNORMAL  msec    SUMMARY:    Left ventricle:  Size was normal.  Systolic function was normal. Ejection fraction was estimated in the range   of 55 % to 65 %. There were no regional wall motion abnormalities. Left atrium:  The atrium was mildly dilated. Pericardium:  A small, loculated pericardial effusion was identified circumferential to   the heart. The fluid had no internal echoes. There was no evidence of hemodynamic compromise. COMPARISONS:  Comparison was made with the previous study of 12-Nov-2014. Pericardial   effusion appearance has not changed. Prepared and signed by    Rafael Ulloa MD    EKG 3/3./15-Sinus  Rhythm    -  Nonspecific T-abnormality. No acute changes  ABNORMAL   msec    EKG 7/7/15-  Sinus  Rhythm   -  Negative precordial T-waves. dysfunction).   The left atrium is Mildly dilated.   Moderate mitral regurgitation is present.      Signature      ----------------------------------------------------------------   Electronically signed by Jazmine Cardoza MD (Interpreting   physician) on 03/27/2019 at 07:02 PM      Conclusions      Summary   No ischemic EKG changes.   The nuclear images is suggestive for mild myocardial ischemia in the apex   and distal anterior walls. Gated EF 60%      Recommendation   Clinical correlation is recommended due to poor image quality.      Signatures      ----------------------------------------------------------------   Electronically signed by Jazmine Cardoza MD (Interpreting   Cardiologist) on 05/07/2019 at 21:06   ----------------------------------------------------------------    Assessment     Diagnosis Orders   1. PAF (paroxysmal atrial fibrillation) (HCC)     2. Cardiomyopathy, probable nonischemic ([address]     3. Abnormal EKG     4. LBBB (left bundle branch block)     5. SOB (shortness of breath) on exertion     6. Abnormal nuclear stress test- poor image quaility mild ischemia probvaly artifact related - no cp- med RX     7.  S/P cardiac cath- 7/18/17- LM-P, LAD MID AND DISTAL 30%, LCX SMALL PATENT , RCA LARGED PATENT, EDP 5 MMHG, EF 60%- MD RX           Previous admission DX  New onset AFB: CVR now Less than 24 hours now IN NSR  CHADS2= 0  TSH, potassium and mag normal No Hx TEMI    Now moderate pericardial effusion by CT done this am  Small pericardial effusion by echo    EKG changes: new T wave inversions leads V4-V6  - trend top      Pleural effusion: s\p Lt thoracentesis 11-16 with 225 ml off    viral syndrome- with possible pericarditis and pleurisy    possible pericarditis and pleurisy    Hx breast CA- s\p lumpectomy and chemo/radiation    Atypical CP upon admit- resolved now      Plan     Still in nSR  BuT NEW ABN EKG LBBB and eveloving  NEED ECHO  NO CP   Sob on exertion      Cardiomyopathy: improving, no CHF symptoms, no change in clinical condition. Will need periodic echocardiograms depending on symptoms. LBBB new and now resolved and EF 45% new drop from 60% prior  No cp  Sob on exertion not new per pat  Off  felcanide 50 bid  Abnormal nuclear stress test- poor image quaility mild ischemia probvaly artifact related - no cp- med RX  D/w the pat  Cont lopressor 25 po bid      Continue the current treatment and with constant vigilance to changes in symptoms and also any potential side effects. Return for care or seek medical attention immediately if symptoms got worse and/or develop new symptoms. Limited echo revealed stable small pericardial effusion- resolved  Completed months of colchicine      PAF 2015 and remain in NSR for over one yr- post chemo and radiation  chads of 0, chads vasc 2 ( age and female)  EKG NSR Normal QTC  Rate control for now and consider rhythm control down the line  Cont.  Cardizem cd 120 qd and flecanide  Remain in NSR for over one yr and will cont asa rather   OA considered and does not want it now  Check event mionitor_ revealed NSR  Cont asa 325 po qd  Any recurrence of atr fib she need OA    Need rhythm control and   Off  Flecanide 50 BID for drop in ef to 45  qtc wnl    HOME BP ADVISED    D/w the pat the plan of care    Echo and ekg reviewed and doing well    RTC in 6 months    Edwardo Quintana [facility]

## 2019-05-13 ENCOUNTER — OFFICE VISIT (OUTPATIENT)
Dept: SURGERY | Age: 69
End: 2019-05-13
Payer: MEDICARE

## 2019-05-13 VITALS
TEMPERATURE: 96.7 F | RESPIRATION RATE: 16 BRPM | HEIGHT: 64 IN | HEART RATE: 72 BPM | OXYGEN SATURATION: 95 % | WEIGHT: 151 LBS | SYSTOLIC BLOOD PRESSURE: 120 MMHG | BODY MASS INDEX: 25.78 KG/M2 | DIASTOLIC BLOOD PRESSURE: 60 MMHG

## 2019-05-13 DIAGNOSIS — C50.512 MALIGNANT NEOPLASM OF LOWER-OUTER QUADRANT OF LEFT BREAST OF FEMALE, ESTROGEN RECEPTOR POSITIVE (HCC): Primary | ICD-10-CM

## 2019-05-13 DIAGNOSIS — M85.80 OSTEOPENIA, UNSPECIFIED LOCATION: ICD-10-CM

## 2019-05-13 DIAGNOSIS — D23.9 DYSPLASTIC NEVUS OF SKIN: ICD-10-CM

## 2019-05-13 DIAGNOSIS — Z17.0 MALIGNANT NEOPLASM OF LOWER-OUTER QUADRANT OF LEFT BREAST OF FEMALE, ESTROGEN RECEPTOR POSITIVE (HCC): Primary | ICD-10-CM

## 2019-05-13 PROCEDURE — G8427 DOCREV CUR MEDS BY ELIG CLIN: HCPCS | Performed by: SURGERY

## 2019-05-13 PROCEDURE — 1123F ACP DISCUSS/DSCN MKR DOCD: CPT | Performed by: SURGERY

## 2019-05-13 PROCEDURE — 1036F TOBACCO NON-USER: CPT | Performed by: SURGERY

## 2019-05-13 PROCEDURE — G8400 PT W/DXA NO RESULTS DOC: HCPCS | Performed by: SURGERY

## 2019-05-13 PROCEDURE — 99213 OFFICE O/P EST LOW 20 MIN: CPT | Performed by: SURGERY

## 2019-05-13 PROCEDURE — 1090F PRES/ABSN URINE INCON ASSESS: CPT | Performed by: SURGERY

## 2019-05-13 PROCEDURE — 3017F COLORECTAL CA SCREEN DOC REV: CPT | Performed by: SURGERY

## 2019-05-13 PROCEDURE — 4040F PNEUMOC VAC/ADMIN/RCVD: CPT | Performed by: SURGERY

## 2019-05-13 PROCEDURE — G8417 CALC BMI ABV UP PARAM F/U: HCPCS | Performed by: SURGERY

## 2019-05-13 ASSESSMENT — ENCOUNTER SYMPTOMS
EYE REDNESS: 0
COLOR CHANGE: 0
EYE PAIN: 0
BLOOD IN STOOL: 0
COUGH: 0
SHORTNESS OF BREATH: 0
NAUSEA: 0
ABDOMINAL PAIN: 0
TROUBLE SWALLOWING: 0
CONSTIPATION: 0
ABDOMINAL DISTENTION: 0

## 2019-05-13 NOTE — PROGRESS NOTES
Gauri Bains MD   General Surgery  Follow-up Patient Evaluation in Office  Pt Name: Yossi Virk  Date of Birth 1950   Today's Date: 5/13/2019  Medical Record Number: [id number]  Referring Provider: No ref. provider found  Primary Care Provider: MARGARET Rodriguez CNP  Chief Complaint:  Chief Complaint   Patient presents with    6 Month Follow-Up     left breast cancer--last mammogram 4/29/19       ASSESSMENT      1. Malignant neoplasm of lower-outer quadrant of left breast of female, estrogen receptor positive ([address]    2. Osteopenia, unspecified location    3. Dysplastic nevus of skin    4. No evidence of recurrent breast cancer     PLANS      1. Continue Arimidex per medical oncology recommendations. 2. Medical oncology follow-up with Dr. Jacquelyn James  3. Encourage monthly self breast examinations and yearly breast imaging  4. Follow-up surgical clinic as needed. Patient nearly 5 years out from her surgery and adjuvant treatment. 5. Continue calcium and vitamin D supplementation for osteopenia. 6.recent breast imaging reviewed. SUBJECTIVE   History of present illness:  Joaquin Beckham is a 80y.o. year old female who is presenting today in the office for Follow-up evaluation of left breast cancer. Joaquin Beckham presented in 2014 with some thickening in the upper outer quadrant of the left breast.  On mammography she had extremely dense breasts. She had a cluster of fine microcalcifications left breast 1:00 position posterior depth. Ultrasound revealed an associated mass. An ultrasound-guided biopsy also showed a potentially enlarged lymph node in the axilla. Ultrasound-guided biopsies revealed invasive ductal carcinoma nuclear grade 2 with metastatic deposit within the left axillary lymph node. Tumor was estrogen receptor +100%, NV +90% and HER-2 nonamplified. Joaquin Beckham saw Dr Jacquelyn James and underwent neoadjuvant chemotherapy.   Staging studies at that time did not reveal conclusive evidence of distant metastatic disease. She underwent 4 cycles of Adriamycin/Cytoxan followed by 12 Taxol treatments. She then underwent a left partial mastectomy and left axillary lymph node dissection on 6/26/2014. She completed external beam radiation therapy. She has been on oral Arimidex therapy since completion of radiation therapy. Interval history: Christopher Leyva is tolerating Arimidex. She denies any of the arthralgias or hot flashes that she had when she first started the medication. Site of dysplastic nevus that was excised between her breasts is well healed. Clinical breast examination today is benign. She continues to take oral calcium and vitamin D for her osteopenia. She was counseled she may have another DEXA scan this fall. Clinical breast examination today is benign. She has no current lymphedema symptoms in her left upper extremity. She has good range of motion. G4, P3. She had her children in her 25s. She was on brief estrogen replacement therapy remotely. Mother history of brain malignancy, no known family history of breast, ovarian or GI malignancy.        Past Medical History  Past Medical History:   Diagnosis Date    Cancer [facility] 2013    Breast LEFT       Past Surgical History  Past Surgical History:   Procedure Laterality Date    BREAST LUMPECTOMY Left 06/26/2014    Needle Loc x2 with Axillary node dissection    BUNIONECTOMY      CARPAL TUNNEL RELEASE Right 11/28/2016    COLONOSCOPY  12/02/2016    ROTATOR CUFF REPAIR Right 08/03/2017        TONSILLECTOMY AND ADENOIDECTOMY Bilateral        Medications  Current Outpatient Medications   Medication Sig Dispense Refill    metoprolol tartrate (LOPRESSOR) 25 MG tablet Take 1 tablet by mouth 2 times daily 180 tablet 4    diltiazem (CARDIZEM CD) 120 MG extended release capsule Take 1 capsule by mouth daily 7 capsule 0    meloxicam (MOBIC) 7.5 MG tablet Take 1 tablet by mouth daily as needed for Pain 90 tablet 1    anastrozole (ARIMIDEX) 1 MG tablet take 1 tablet by mouth once daily 90 tablet 3    calcium carbonate (OSCAL) 500 MG TABS tablet Take 500 mg by mouth 2 times daily      Multiple Vitamins-Minerals (MULTIVITAMIN PO) Take 1 tablet by mouth daily       aspirin 325 MG EC tablet Take 1 tablet by mouth daily. 30 tablet 3    acetaminophen 650 MG TABS Take 650 mg by mouth every 4 hours as needed. 120 tablet 3     No current facility-administered medications for this visit. Allergies  No Known Allergies    Family History  Family History   Problem Relation Age of Onset    Cancer Mother     Mult Sclerosis Mother     Alcohol Abuse Father     Cancer Father        Social History  Social History     Socioeconomic History    Marital status:      Spouse name: Not on file    Number of children: 3    Years of education: Not on file    Highest education level: Not on file   Occupational History    Not on file   Social Needs    Financial resource strain: Not on file    Food insecurity:     Worry: Not on file     Inability: Not on file    Transportation needs:     Medical: Not on file     Non-medical: Not on file   Tobacco Use    Smoking status: Former Smoker     Packs/day: 0.25     Years: 25.00     Pack years: 6.25     Last attempt to quit: 2005     Years since quittin.3    Smokeless tobacco: Never Used   Substance and Sexual Activity    Alcohol use:  Yes     Alcohol/week: 0.0 oz     Comment: social    Drug use: No    Sexual activity: Not on file   Lifestyle    Physical activity:     Days per week: Not on file     Minutes per session: Not on file    Stress: Not on file   Relationships    Social connections:     Talks on phone: Not on file     Gets together: Not on file     Attends Spiritism service: Not on file     Active member of club or organization: Not on file     Attends meetings of clubs or organizations: Not on file     Relationship status: Not on file    Intimate partner violence:     Fear of current or ex partner: Not on file     Emotionally abused: Not on file     Physically abused: Not on file     Forced sexual activity: Not on file   Other Topics Concern    Not on file   Social History Narrative    Not on file           Review of Systems  Review of Systems   Constitutional: Negative for activity change, chills, fatigue, fever and unexpected weight change. HENT: Negative for dental problem, hearing loss, nosebleeds and trouble swallowing. Eyes: Negative for pain and redness. Respiratory: Negative for cough and shortness of breath. Cardiovascular: Negative for chest pain and leg swelling. Gastrointestinal: Negative for abdominal distention, abdominal pain, blood in stool, constipation and nausea. Endocrine: Negative for cold intolerance, heat intolerance, polydipsia and polyuria. Genitourinary: Negative for dysuria, flank pain and hematuria. Musculoskeletal: Negative for arthralgias, joint swelling and neck pain. Skin: Negative for color change, pallor and rash. Allergic/Immunologic: Negative for environmental allergies and food allergies. Neurological: Negative for dizziness, syncope, light-headedness and headaches. Psychiatric/Behavioral: Negative for agitation and confusion. The patient is not nervous/anxious. OBJECTIVE     /60 (Site: Right Upper Arm, Position: Sitting, Cuff Size: Medium Adult)   Pulse 72   Temp 96.7 °F (35.9 °C) (Tympanic)   Resp 16   Ht 5' 4\" (1.626 m)   Wt 151 lb (68.5 kg)   SpO2 95%   Breastfeeding? No   BMI 25.92 kg/m²     Physical Exam   Constitutional: She is oriented to person, place, and time. She appears well-developed and well-nourished. No distress. HENT:   Head: Normocephalic and atraumatic. Eyes: Pupils are equal, round, and reactive to light. EOM are normal. Left eye exhibits no discharge. No scleral icterus. Neck: Normal range of motion. Neck supple. No JVD present. No tracheal deviation present. Cardiovascular: Normal rate, regular rhythm and normal heart sounds. Pulmonary/Chest: Effort normal and breath sounds normal. No respiratory distress. She has no wheezes. She exhibits no tenderness. Right breast exhibits no inverted nipple, no mass, no nipple discharge, no skin change and no tenderness. Left breast exhibits no inverted nipple, no mass, no nipple discharge, no skin change and no tenderness. Abdominal: Soft. She exhibits no distension. There is no tenderness. There is no guarding. Musculoskeletal: Normal range of motion. She exhibits no edema. Lymphadenopathy:     She has no cervical adenopathy. She has no axillary adenopathy. Neurological: She is alert and oriented to person, place, and time. Skin: Skin is warm and dry. No rash noted. No erythema. Psychiatric: She has a normal mood and affect. Her behavior is normal. Thought content normal.   Nursing note and vitals reviewed. Lab Results   Component Value Date    WBC 8.3 11/13/2018    HGB 14.2 11/13/2018    HCT 41.5 11/13/2018     11/13/2018    CHOL 217 (H) 04/02/2018    TRIG 67 04/02/2018     04/02/2018    ALT 16 11/13/2018    AST 19 11/13/2018     11/13/2018    K 4.2 11/13/2018     07/18/2017    CREATININE 0.7 11/13/2018    BUN 14 07/18/2017    CO2 28 07/18/2017    TSH 1.080 11/12/2014            IMPRESSION: Mammogram BI-RADS: 2: Benign       There is no mammographic evidence of malignancy. A 1 year    screening mammogram is recommended. The patient has been entered    into our database and they will receive a notification when they    are due for their next exam.     The patient was notified of the results.         #[id number] - [facility] DEBRA DIGITAL SCREEN BILATERAL   BILATERAL DIGITAL SCREENING MAMMOGRAM 3D/2D WITH CAD: 4/29/2019   CLINICAL: Tomosynthesis.  Screening.  Personal history of breast    cancer.         Comparison is made to exams dated:  4/23/2018 mammogram and    4/17/2017 mammogram [facility].     The tissue of both breasts is heterogeneously dense.  This may    lower the sensitivity of mammography.     Current study was also evaluated with a Computer Aided Detection    (CAD) system.     There are benign scattered calcifications both breasts. Dorothea Cardona    also is a benign nodule right breast.  Additionally, there are    post operative findings and biopsy clips left breast.     No significant masses, calcifications, or other findings are seen    in either breast.     There has been no significant interval change.               Dr. Legih villagran/shlomo:4/29/2019 17:27:42     copy to: Thomas Arevalo MD, [facility]. [facility], ph:    [phone], fax: [phone]       Imaging Technologist: Aj PEREA(R)(M), [employer] [id number]   letter sent: [facility] Doctor Names     [id number]     Imaging reviewed

## 2019-05-16 ENCOUNTER — HOSPITAL ENCOUNTER (OUTPATIENT)
Dept: INFUSION THERAPY | Age: 69
Discharge: HOME | End: 2019-05-16
Payer: MEDICARE

## 2019-05-16 ENCOUNTER — OFFICE VISIT (OUTPATIENT)
Dept: ONCOLOGY | Age: 69
End: 2019-05-16
Payer: MEDICARE

## 2019-05-16 VITALS
WEIGHT: 150.4 LBS | TEMPERATURE: 98.7 F | SYSTOLIC BLOOD PRESSURE: 127 MMHG | OXYGEN SATURATION: 98 % | BODY MASS INDEX: 25.68 KG/M2 | HEART RATE: 62 BPM | RESPIRATION RATE: 18 BRPM | HEIGHT: 64 IN | DIASTOLIC BLOOD PRESSURE: 59 MMHG

## 2019-05-16 DIAGNOSIS — C50.912 CARCINOMA OF LEFT BREAST METASTATIC TO AXILLARY LYMPH NODE (HCC): ICD-10-CM

## 2019-05-16 DIAGNOSIS — C77.3 CARCINOMA OF LEFT BREAST METASTATIC TO AXILLARY LYMPH NODE (HCC): Primary | ICD-10-CM

## 2019-05-16 DIAGNOSIS — C50.912 BREAST CANCER METASTASIZED TO AXILLARY LYMPH NODE, LEFT (HCC): ICD-10-CM

## 2019-05-16 DIAGNOSIS — C50.912 CARCINOMA OF LEFT BREAST METASTATIC TO AXILLARY LYMPH NODE (HCC): Primary | ICD-10-CM

## 2019-05-16 DIAGNOSIS — C77.3 BREAST CANCER METASTASIZED TO AXILLARY LYMPH NODE, LEFT (HCC): ICD-10-CM

## 2019-05-16 DIAGNOSIS — C77.3 CARCINOMA OF LEFT BREAST METASTATIC TO AXILLARY LYMPH NODE (HCC): ICD-10-CM

## 2019-05-16 LAB
ALBUMIN SERPL-MCNC: 3.7 G/DL (ref 3.5–5.1)
ALP BLD-CCNC: 98 U/L (ref 38–126)
ALT SERPL-CCNC: 15 U/L (ref 11–66)
AST SERPL-CCNC: 21 U/L (ref 5–40)
BASINOPHIL, AUTOMATED: 1 % (ref 0–3)
BILIRUB SERPL-MCNC: < 0.2 MG/DL (ref 0.3–1.2)
BILIRUBIN DIRECT: < 0.2 MG/DL (ref 0–0.3)
BUN, WHOLE BLOOD: 14 MG/DL (ref 8–26)
CHLORIDE, WHOLE BLOOD: 103 MEQ/L (ref 98–109)
CREATININE, WHOLE BLOOD: 0.7 MG/DL (ref 0.5–1.2)
EOSINOPHILS RELATIVE PERCENT: 4 % (ref 0–4)
GFR, ESTIMATED: 88 ML/MIN/1.73M2
GLUCOSE, WHOLE BLOOD: 119 MG/DL (ref 70–108)
HCT VFR BLD CALC: 38.4 % (ref 37–47)
HEMOGLOBIN: 13.1 GM/DL (ref 12–16)
IONIZED CALCIUM, WHOLE BLOOD: 1.27 MMOL/L (ref 1.12–1.32)
LYMPHOCYTES # BLD: 35 % (ref 15–47)
MCH RBC QN AUTO: 30 PG (ref 27–31)
MCHC RBC AUTO-ENTMCNC: 34.2 GM/DL (ref 33–37)
MCV RBC AUTO: 88 FL (ref 81–99)
MONOCYTES: 7 % (ref 0–12)
PDW BLD-RTO: 11.3 % (ref 11.5–14.5)
PLATELET # BLD: 227 THOU/MM3 (ref 130–400)
PMV BLD AUTO: 6.9 FL (ref 7.4–10.4)
POTASSIUM, WHOLE BLOOD: 4.2 MEQ/L (ref 3.5–4.9)
RBC # BLD: 4.38 MILL/MM3 (ref 4.2–5.4)
SEG NEUTROPHILS: 54 % (ref 43–75)
SODIUM, WHOLE BLOOD: 141 MEQ/L (ref 138–146)
TOTAL CO2, WHOLE BLOOD: 29 MEQ/L (ref 23–33)
TOTAL PROTEIN: 6.5 G/DL (ref 6.1–8)
WBC # BLD: 7.2 THOU/MM3 (ref 4.8–10.8)

## 2019-05-16 PROCEDURE — 80076 HEPATIC FUNCTION PANEL: CPT

## 2019-05-16 PROCEDURE — 1090F PRES/ABSN URINE INCON ASSESS: CPT | Performed by: INTERNAL MEDICINE

## 2019-05-16 PROCEDURE — 1123F ACP DISCUSS/DSCN MKR DOCD: CPT | Performed by: INTERNAL MEDICINE

## 2019-05-16 PROCEDURE — 80047 BASIC METABLC PNL IONIZED CA: CPT

## 2019-05-16 PROCEDURE — 85025 COMPLETE CBC W/AUTO DIFF WBC: CPT

## 2019-05-16 PROCEDURE — 99211 OFF/OP EST MAY X REQ PHY/QHP: CPT

## 2019-05-16 PROCEDURE — 1036F TOBACCO NON-USER: CPT | Performed by: INTERNAL MEDICINE

## 2019-05-16 PROCEDURE — 36415 COLL VENOUS BLD VENIPUNCTURE: CPT

## 2019-05-16 PROCEDURE — 99214 OFFICE O/P EST MOD 30 MIN: CPT | Performed by: INTERNAL MEDICINE

## 2019-05-16 PROCEDURE — 3017F COLORECTAL CA SCREEN DOC REV: CPT | Performed by: INTERNAL MEDICINE

## 2019-05-16 PROCEDURE — G8417 CALC BMI ABV UP PARAM F/U: HCPCS | Performed by: INTERNAL MEDICINE

## 2019-05-16 PROCEDURE — G8400 PT W/DXA NO RESULTS DOC: HCPCS | Performed by: INTERNAL MEDICINE

## 2019-05-16 PROCEDURE — G8427 DOCREV CUR MEDS BY ELIG CLIN: HCPCS | Performed by: INTERNAL MEDICINE

## 2019-05-16 PROCEDURE — 4040F PNEUMOC VAC/ADMIN/RCVD: CPT | Performed by: INTERNAL MEDICINE

## 2019-05-16 NOTE — PROGRESS NOTES
[facility] PROFESSIONAL SERVICES  [facility] OF [facility]  [address] [facility] [address]  [address]  Dept: [phone]  Dept Fax: [phone]  Loc: [phone]       Subjective:      Chief Complaint: Brigitte Noe is a 52-year-old female with a history of breast cancer. She was noted to have some thickening in the upper outer quadrant of the left breast. On mammography, she had extremely dense breasts. There was a cluster fine calcifications in the left breast. An ultrasound was obtained and ultrasound guided biopsy was completed. She also had low density towards the left axilla which was suspicious for enlarged lymph node. Core biopsy of the left breast mass and the enlarged lymph node revealed invasive ductal carcinoma, nuclear grade 1-2 as well as metastatic ductal adenocarcinoma in the lymph node. The tumor was noted to be estrogen receptor positive (100%) and progesterone receptor positive (90%). HER-2-олег was not amplified. She subsequently did undergo an MRI to rule out additional lesions and CT scans of the chest, abdomen and pelvis to evaluate for metastatic disease. There is no obvious evidence of distant metastatic disease. There was ground glass attenuation along the right major fissure of the lung which could represent inflammation or scarring, but nodules cannot not be entirely excluded. MRI revealed a stable 7 mm lobular mass right breast 11 o'clock posterior depth, appeared benign. There was an additional 5 mm round focus in the left breast 11 o'clock posterior depth which had a low suspicion for malignancy, but an ultrasound guided biopsy of this area confirmed benign tissue. There was 2 cm mass left breast 2 o'clock posterior depth consistent with a known carcinoma, extended to but did not involve the pectoralis muscle. The patient was started in neoadjuvant chemotherapy. She completed chemotherapy, surgery and radiation therapy treatment.      HPI:  The patient is here today for follow examination. She is here for follow up of her history of breast cancer. The patient is currently on therapy with Arimidex. She tolerates this well and without side effects. Her overall health has been good. She has no signs or symptoms that are suggestive of recurrence of her breast cancer. The patient denies skeletal pain. She has not had fever or other signs of infection. The patient denies shortness of breath, chest pain, a change in bowel habits or a change in bladder habits. ECOG performance status is level 0. Her most recent mammogram was on 04/29/2019. This was reported as a benign appearing mammogram.  The results of the mammogram reviewed with the patient today. PMH, SH, and FH:  I reviewed the PMH, SH and FH as noted on the electronic medical record. There have been no changes as noted in the previous documentation. Review of Systems   Constitutional: Negative. HENT: Negative. Eyes: Negative. Respiratory: Negative. Cardiovascular: Negative. Gastrointestinal: Negative. Genitourinary: Negative. Musculoskeletal: Negative. Skin: Negative. Neurological: Negative. Hematological: Negative. Psychiatric/Behavioral: Negative. Objective:   Physical Exam   Vitals:    05/16/19 1021   BP: (!) 127/59   Pulse: 62   Resp: 18   Temp: 98.7 °F (37.1 °C)   SpO2: 98%   Vitals reviewed and are stable. Constitutional: Well-developed and well-nourished. No acute distress. HENT: Normocephalic and atraumatic. Eyes: Pupils are equal and reactive. No scleral icterus. Neck: Overall appearance is symmetrical. No identifiable masses. Chest: Inspection and palpation of chest is normal.  Pulmonary: Effort normal. No respiratory distress. Cardiovascular: RRR. No edema in any of the four extremities. Abdominal: Soft. No hepatomegaly or splenomegaly. Musculoskeletal: Gait is normal. Muscle strength and tone good. Neurological: Alert and oriented to person, place, and time. Judgment and thought content normal.  Skin: Skin is warm and dry. No rash. Psychiatric: Mood and affect appropriate for the clinical situation. Behavior is normal.      Data Analysis:    Hematology 5/16/2019 11/13/2018 5/17/2018   WBC 7.2 8.3 7.0   RBC 4.38 4.78 4.55   HGB 13.1 14.2 13.7   HCT 38.4 41.5 41.0   MCV 88 87 90   RDW 11.3 (L) 11.4 (L) 11.8    270 265     Assessment:   1. Breast cancer. 2.  Arimidex therapy. 3.  History of unexplained pleural effusion. Plan:   1. Continue Arimidex 1 mg tablet by mouth daily. 2.  Monitor for recurrence of malignancy. 3.  Continue annual mammogram.  4.  Monitor for side effects and toxicity from Arimidex. 5.  Monitor for recurrent pleural effusion. Deyanira Monson M.D. Medical Director: [facility] [address] [place] [facility], [address] [place] [address] of the [facility] at [facility]      **This report has been created using voice recognition software. It may contain minor errors which are inherent in voice recognition technology. **

## 2019-06-03 RX ORDER — FLECAINIDE ACETATE 50 MG/1
TABLET ORAL
Qty: 180 TABLET | Refills: 3 | Status: SHIPPED | OUTPATIENT
Start: 2019-06-03 | End: 2019-09-13 | Stop reason: ALTCHOICE

## 2019-06-25 DIAGNOSIS — C77.3 BREAST CANCER METASTASIZED TO AXILLARY LYMPH NODE, LEFT (HCC): ICD-10-CM

## 2019-06-25 DIAGNOSIS — C50.912 BREAST CANCER METASTASIZED TO AXILLARY LYMPH NODE, LEFT (HCC): ICD-10-CM

## 2019-06-25 RX ORDER — ANASTROZOLE 1 MG/1
TABLET ORAL
Qty: 90 TABLET | Refills: 3 | Status: SHIPPED | OUTPATIENT
Start: 2019-06-25 | End: 2020-01-14

## 2019-09-09 ENCOUNTER — TELEPHONE (OUTPATIENT)
Dept: CARDIOLOGY CLINIC | Age: 69
End: 2019-09-09

## 2019-09-13 ENCOUNTER — OFFICE VISIT (OUTPATIENT)
Dept: CARDIOLOGY CLINIC | Age: 69
End: 2019-09-13
Payer: MEDICARE

## 2019-09-13 VITALS
BODY MASS INDEX: 25.47 KG/M2 | SYSTOLIC BLOOD PRESSURE: 142 MMHG | WEIGHT: 149.2 LBS | HEIGHT: 64 IN | HEART RATE: 60 BPM | DIASTOLIC BLOOD PRESSURE: 67 MMHG

## 2019-09-13 DIAGNOSIS — R94.39 ABNORMAL NUCLEAR STRESS TEST: ICD-10-CM

## 2019-09-13 DIAGNOSIS — R06.02 SOB (SHORTNESS OF BREATH) ON EXERTION: ICD-10-CM

## 2019-09-13 DIAGNOSIS — I42.8 CARDIOMYOPATHY, NONISCHEMIC (HCC): ICD-10-CM

## 2019-09-13 DIAGNOSIS — I48.0 PAROXYSMAL A-FIB (HCC): ICD-10-CM

## 2019-09-13 DIAGNOSIS — R94.31 ABNORMAL EKG: ICD-10-CM

## 2019-09-13 DIAGNOSIS — I44.7 LBBB (LEFT BUNDLE BRANCH BLOCK): ICD-10-CM

## 2019-09-13 DIAGNOSIS — Z98.890 S/P CARDIAC CATH: ICD-10-CM

## 2019-09-13 DIAGNOSIS — R60.0 BILATERAL LEG EDEMA: ICD-10-CM

## 2019-09-13 DIAGNOSIS — Z01.818 PRE-OP EVALUATION: Primary | ICD-10-CM

## 2019-09-13 PROCEDURE — G8427 DOCREV CUR MEDS BY ELIG CLIN: HCPCS | Performed by: INTERNAL MEDICINE

## 2019-09-13 PROCEDURE — G8417 CALC BMI ABV UP PARAM F/U: HCPCS | Performed by: INTERNAL MEDICINE

## 2019-09-13 PROCEDURE — 3017F COLORECTAL CA SCREEN DOC REV: CPT | Performed by: INTERNAL MEDICINE

## 2019-09-13 PROCEDURE — 93000 ELECTROCARDIOGRAM COMPLETE: CPT | Performed by: INTERNAL MEDICINE

## 2019-09-13 PROCEDURE — 1123F ACP DISCUSS/DSCN MKR DOCD: CPT | Performed by: INTERNAL MEDICINE

## 2019-09-13 PROCEDURE — 1036F TOBACCO NON-USER: CPT | Performed by: INTERNAL MEDICINE

## 2019-09-13 PROCEDURE — 1090F PRES/ABSN URINE INCON ASSESS: CPT | Performed by: INTERNAL MEDICINE

## 2019-09-13 PROCEDURE — 4040F PNEUMOC VAC/ADMIN/RCVD: CPT | Performed by: INTERNAL MEDICINE

## 2019-09-13 PROCEDURE — 99214 OFFICE O/P EST MOD 30 MIN: CPT | Performed by: INTERNAL MEDICINE

## 2019-09-13 PROCEDURE — G8400 PT W/DXA NO RESULTS DOC: HCPCS | Performed by: INTERNAL MEDICINE

## 2019-09-13 RX ORDER — SPIRONOLACTONE AND HYDROCHLOROTHIAZIDE 25; 25 MG/1; MG/1
0.5 TABLET ORAL DAILY
Qty: 30 TABLET | Refills: 3 | Status: SHIPPED | OUTPATIENT
Start: 2019-09-13 | End: 2020-04-27 | Stop reason: SDUPTHER

## 2019-09-13 NOTE — PROGRESS NOTES
Father     Cancer Father         Social History     Socioeconomic History    Marital status:      Spouse name: Not on file    Number of children: 3    Years of education: Not on file    Highest education level: Not on file   Occupational History    Not on file   Social Needs    Financial resource strain: Not on file    Food insecurity:     Worry: Not on file     Inability: Not on file    Transportation needs:     Medical: Not on file     Non-medical: Not on file   Tobacco Use    Smoking status: Former Smoker     Packs/day: 0.25     Years: 25.00     Pack years: 6.25     Last attempt to quit: 2005     Years since quittin.7    Smokeless tobacco: Never Used   Substance and Sexual Activity    Alcohol use:  Yes     Alcohol/week: 0.0 standard drinks     Comment: social    Drug use: No    Sexual activity: Not on file   Lifestyle    Physical activity:     Days per week: Not on file     Minutes per session: Not on file    Stress: Not on file   Relationships    Social connections:     Talks on phone: Not on file     Gets together: Not on file     Attends Holiness service: Not on file     Active member of club or organization: Not on file     Attends meetings of clubs or organizations: Not on file     Relationship status: Not on file    Intimate partner violence:     Fear of current or ex partner: Not on file     Emotionally abused: Not on file     Physically abused: Not on file     Forced sexual activity: Not on file   Other Topics Concern    Not on file   Social History Narrative    Not on file       Current Outpatient Prescriptions   Medication Sig Dispense Refill    calcium carbonate (OSCAL) 500 MG TABS tablet Take 500 mg by mouth 2 times daily      anastrozole (ARIMIDEX) 1 MG tablet take 1 tablet by mouth once daily REQUEST FOR 90 DAY RX 90 tablet 3    diltiazem (CARDIZEM SR) 120 MG SR capsule Take 1 capsule by mouth daily 60 capsule 3    flecainide (TAMBOCOR) 50 MG tablet TAKE 1

## 2019-10-02 ENCOUNTER — OFFICE VISIT (OUTPATIENT)
Dept: FAMILY MEDICINE CLINIC | Age: 69
End: 2019-10-02
Payer: MEDICARE

## 2019-10-02 VITALS
RESPIRATION RATE: 14 BRPM | DIASTOLIC BLOOD PRESSURE: 62 MMHG | HEIGHT: 64 IN | TEMPERATURE: 98.5 F | BODY MASS INDEX: 24.65 KG/M2 | WEIGHT: 144.4 LBS | HEART RATE: 64 BPM | SYSTOLIC BLOOD PRESSURE: 122 MMHG

## 2019-10-02 DIAGNOSIS — J21.9 ACUTE BRONCHIOLITIS DUE TO UNSPECIFIED ORGANISM: Primary | ICD-10-CM

## 2019-10-02 PROCEDURE — 99213 OFFICE O/P EST LOW 20 MIN: CPT | Performed by: NURSE PRACTITIONER

## 2019-10-02 PROCEDURE — G8427 DOCREV CUR MEDS BY ELIG CLIN: HCPCS | Performed by: NURSE PRACTITIONER

## 2019-10-02 PROCEDURE — 3017F COLORECTAL CA SCREEN DOC REV: CPT | Performed by: NURSE PRACTITIONER

## 2019-10-02 PROCEDURE — 1123F ACP DISCUSS/DSCN MKR DOCD: CPT | Performed by: NURSE PRACTITIONER

## 2019-10-02 PROCEDURE — 1090F PRES/ABSN URINE INCON ASSESS: CPT | Performed by: NURSE PRACTITIONER

## 2019-10-02 PROCEDURE — G8400 PT W/DXA NO RESULTS DOC: HCPCS | Performed by: NURSE PRACTITIONER

## 2019-10-02 PROCEDURE — 1036F TOBACCO NON-USER: CPT | Performed by: NURSE PRACTITIONER

## 2019-10-02 PROCEDURE — G8420 CALC BMI NORM PARAMETERS: HCPCS | Performed by: NURSE PRACTITIONER

## 2019-10-02 PROCEDURE — G8484 FLU IMMUNIZE NO ADMIN: HCPCS | Performed by: NURSE PRACTITIONER

## 2019-10-02 PROCEDURE — 4040F PNEUMOC VAC/ADMIN/RCVD: CPT | Performed by: NURSE PRACTITIONER

## 2019-10-02 RX ORDER — CEFDINIR 300 MG/1
300 CAPSULE ORAL 2 TIMES DAILY
Qty: 20 CAPSULE | Refills: 0 | Status: SHIPPED | OUTPATIENT
Start: 2019-10-02 | End: 2019-10-12

## 2019-10-02 ASSESSMENT — ENCOUNTER SYMPTOMS
GASTROINTESTINAL NEGATIVE: 1
WHEEZING: 1
COUGH: 1
EYES NEGATIVE: 1

## 2019-10-13 PROBLEM — Z01.818 PRE-OP EVALUATION: Status: RESOLVED | Noted: 2019-09-13 | Resolved: 2019-10-13

## 2019-11-14 ENCOUNTER — HOSPITAL ENCOUNTER (OUTPATIENT)
Dept: INFUSION THERAPY | Age: 69
Discharge: HOME | End: 2019-11-14
Payer: MEDICARE

## 2019-11-14 ENCOUNTER — OFFICE VISIT (OUTPATIENT)
Dept: ONCOLOGY | Age: 69
End: 2019-11-14
Payer: MEDICARE

## 2019-11-14 VITALS
DIASTOLIC BLOOD PRESSURE: 58 MMHG | HEART RATE: 67 BPM | RESPIRATION RATE: 16 BRPM | WEIGHT: 148.6 LBS | SYSTOLIC BLOOD PRESSURE: 119 MMHG | BODY MASS INDEX: 25.37 KG/M2 | HEIGHT: 64 IN | OXYGEN SATURATION: 97 % | TEMPERATURE: 97.9 F

## 2019-11-14 DIAGNOSIS — Z79.811 ENCOUNTER FOR MONITORING AROMATASE INHIBITOR THERAPY: ICD-10-CM

## 2019-11-14 DIAGNOSIS — C77.3 CARCINOMA OF LEFT BREAST METASTATIC TO AXILLARY LYMPH NODE (HCC): ICD-10-CM

## 2019-11-14 DIAGNOSIS — J90 PLEURAL EFFUSION: ICD-10-CM

## 2019-11-14 DIAGNOSIS — C50.912 CARCINOMA OF LEFT BREAST METASTATIC TO AXILLARY LYMPH NODE (HCC): ICD-10-CM

## 2019-11-14 DIAGNOSIS — Z51.81 ENCOUNTER FOR MONITORING AROMATASE INHIBITOR THERAPY: ICD-10-CM

## 2019-11-14 DIAGNOSIS — R60.0 BILATERAL LEG EDEMA: ICD-10-CM

## 2019-11-14 DIAGNOSIS — Z12.31 ENCOUNTER FOR SCREENING MAMMOGRAM FOR MALIGNANT NEOPLASM OF BREAST: ICD-10-CM

## 2019-11-14 DIAGNOSIS — Z85.3 HISTORY OF BREAST CANCER: Primary | ICD-10-CM

## 2019-11-14 LAB
ALBUMIN SERPL-MCNC: 4 G/DL (ref 3.5–5.1)
ALP BLD-CCNC: 90 U/L (ref 38–126)
ALT SERPL-CCNC: 15 U/L (ref 11–66)
AST SERPL-CCNC: 18 U/L (ref 5–40)
BILIRUB SERPL-MCNC: 0.2 MG/DL (ref 0.3–1.2)
BILIRUBIN DIRECT: < 0.2 MG/DL (ref 0–0.3)
BUN, WHOLE BLOOD: 19 MG/DL (ref 8–26)
CHLORIDE, WHOLE BLOOD: 101 MEQ/L (ref 98–109)
CREATININE, WHOLE BLOOD: 0.8 MG/DL (ref 0.5–1.2)
GFR, ESTIMATED: 76 ML/MIN/1.73M2
GLUCOSE, WHOLE BLOOD: 83 MG/DL (ref 70–108)
HCT VFR BLD CALC: 40 % (ref 37–47)
HEMOGLOBIN: 13.4 GM/DL (ref 12–16)
IONIZED CALCIUM, WHOLE BLOOD: 1.28 MMOL/L (ref 1.12–1.32)
MCH RBC QN AUTO: 30.2 PG (ref 27–31)
MCHC RBC AUTO-ENTMCNC: 33.4 GM/DL (ref 33–37)
MCV RBC AUTO: 90 FL (ref 81–99)
PDW BLD-RTO: 10.9 % (ref 11.5–14.5)
PLATELET # BLD: 267 THOU/MM3 (ref 130–400)
PMV BLD AUTO: 7 FL (ref 7.4–10.4)
POTASSIUM, WHOLE BLOOD: 4.8 MEQ/L (ref 3.5–4.9)
RBC # BLD: 4.43 MILL/MM3 (ref 4.2–5.4)
SODIUM, WHOLE BLOOD: 138 MEQ/L (ref 138–146)
TOTAL CO2, WHOLE BLOOD: 30 MEQ/L (ref 23–33)
TOTAL PROTEIN: 6.7 G/DL (ref 6.1–8)
WBC # BLD: 7.6 THOU/MM3 (ref 4.8–10.8)

## 2019-11-14 PROCEDURE — G8400 PT W/DXA NO RESULTS DOC: HCPCS | Performed by: INTERNAL MEDICINE

## 2019-11-14 PROCEDURE — 3017F COLORECTAL CA SCREEN DOC REV: CPT | Performed by: INTERNAL MEDICINE

## 2019-11-14 PROCEDURE — G8484 FLU IMMUNIZE NO ADMIN: HCPCS | Performed by: INTERNAL MEDICINE

## 2019-11-14 PROCEDURE — 4040F PNEUMOC VAC/ADMIN/RCVD: CPT | Performed by: INTERNAL MEDICINE

## 2019-11-14 PROCEDURE — 1090F PRES/ABSN URINE INCON ASSESS: CPT | Performed by: INTERNAL MEDICINE

## 2019-11-14 PROCEDURE — 80076 HEPATIC FUNCTION PANEL: CPT

## 2019-11-14 PROCEDURE — 1036F TOBACCO NON-USER: CPT | Performed by: INTERNAL MEDICINE

## 2019-11-14 PROCEDURE — 99214 OFFICE O/P EST MOD 30 MIN: CPT | Performed by: INTERNAL MEDICINE

## 2019-11-14 PROCEDURE — 1123F ACP DISCUSS/DSCN MKR DOCD: CPT | Performed by: INTERNAL MEDICINE

## 2019-11-14 PROCEDURE — G8427 DOCREV CUR MEDS BY ELIG CLIN: HCPCS | Performed by: INTERNAL MEDICINE

## 2019-11-14 PROCEDURE — G8417 CALC BMI ABV UP PARAM F/U: HCPCS | Performed by: INTERNAL MEDICINE

## 2019-11-14 PROCEDURE — 85027 COMPLETE CBC AUTOMATED: CPT

## 2019-11-14 PROCEDURE — 36415 COLL VENOUS BLD VENIPUNCTURE: CPT

## 2019-11-14 PROCEDURE — 99211 OFF/OP EST MAY X REQ PHY/QHP: CPT

## 2019-11-14 PROCEDURE — 80047 BASIC METABLC PNL IONIZED CA: CPT

## 2020-01-14 ENCOUNTER — OFFICE VISIT (OUTPATIENT)
Dept: FAMILY MEDICINE CLINIC | Age: 70
End: 2020-01-14
Payer: MEDICARE

## 2020-01-14 VITALS
SYSTOLIC BLOOD PRESSURE: 120 MMHG | WEIGHT: 149 LBS | RESPIRATION RATE: 14 BRPM | DIASTOLIC BLOOD PRESSURE: 60 MMHG | OXYGEN SATURATION: 98 % | HEIGHT: 64 IN | HEART RATE: 80 BPM | BODY MASS INDEX: 25.44 KG/M2 | TEMPERATURE: 97.7 F

## 2020-01-14 PROCEDURE — 3017F COLORECTAL CA SCREEN DOC REV: CPT | Performed by: NURSE PRACTITIONER

## 2020-01-14 PROCEDURE — G8427 DOCREV CUR MEDS BY ELIG CLIN: HCPCS | Performed by: NURSE PRACTITIONER

## 2020-01-14 PROCEDURE — 96372 THER/PROPH/DIAG INJ SC/IM: CPT | Performed by: NURSE PRACTITIONER

## 2020-01-14 PROCEDURE — 1090F PRES/ABSN URINE INCON ASSESS: CPT | Performed by: NURSE PRACTITIONER

## 2020-01-14 PROCEDURE — 99213 OFFICE O/P EST LOW 20 MIN: CPT | Performed by: NURSE PRACTITIONER

## 2020-01-14 PROCEDURE — G8417 CALC BMI ABV UP PARAM F/U: HCPCS | Performed by: NURSE PRACTITIONER

## 2020-01-14 PROCEDURE — G8400 PT W/DXA NO RESULTS DOC: HCPCS | Performed by: NURSE PRACTITIONER

## 2020-01-14 PROCEDURE — 1123F ACP DISCUSS/DSCN MKR DOCD: CPT | Performed by: NURSE PRACTITIONER

## 2020-01-14 PROCEDURE — 4040F PNEUMOC VAC/ADMIN/RCVD: CPT | Performed by: NURSE PRACTITIONER

## 2020-01-14 PROCEDURE — 1036F TOBACCO NON-USER: CPT | Performed by: NURSE PRACTITIONER

## 2020-01-14 PROCEDURE — G8484 FLU IMMUNIZE NO ADMIN: HCPCS | Performed by: NURSE PRACTITIONER

## 2020-01-14 RX ORDER — TRIAMCINOLONE ACETONIDE 40 MG/ML
40 INJECTION, SUSPENSION INTRA-ARTICULAR; INTRAMUSCULAR ONCE
Status: COMPLETED | OUTPATIENT
Start: 2020-01-14 | End: 2020-01-14

## 2020-01-14 RX ORDER — ALBUTEROL SULFATE 90 UG/1
2 AEROSOL, METERED RESPIRATORY (INHALATION) EVERY 6 HOURS PRN
Qty: 1 INHALER | Refills: 0 | Status: SHIPPED | OUTPATIENT
Start: 2020-01-14 | End: 2020-05-20 | Stop reason: ALTCHOICE

## 2020-01-14 RX ORDER — BENZONATATE 200 MG/1
200 CAPSULE ORAL 3 TIMES DAILY PRN
Qty: 30 CAPSULE | Refills: 0 | Status: SHIPPED | OUTPATIENT
Start: 2020-01-14 | End: 2020-01-21

## 2020-01-14 RX ORDER — CEPHALEXIN 500 MG/1
500 CAPSULE ORAL 3 TIMES DAILY
Qty: 30 CAPSULE | Refills: 0 | Status: SHIPPED | OUTPATIENT
Start: 2020-01-14 | End: 2020-01-24

## 2020-01-14 RX ADMIN — TRIAMCINOLONE ACETONIDE 40 MG: 40 INJECTION, SUSPENSION INTRA-ARTICULAR; INTRAMUSCULAR at 13:36

## 2020-01-14 ASSESSMENT — ENCOUNTER SYMPTOMS
GASTROINTESTINAL NEGATIVE: 1
EYES NEGATIVE: 1
WHEEZING: 1
COUGH: 1

## 2020-01-14 ASSESSMENT — PATIENT HEALTH QUESTIONNAIRE - PHQ9
1. LITTLE INTEREST OR PLEASURE IN DOING THINGS: 0
2. FEELING DOWN, DEPRESSED OR HOPELESS: 0
SUM OF ALL RESPONSES TO PHQ QUESTIONS 1-9: 0
SUM OF ALL RESPONSES TO PHQ QUESTIONS 1-9: 0
SUM OF ALL RESPONSES TO PHQ9 QUESTIONS 1 & 2: 0

## 2020-01-14 NOTE — PROGRESS NOTES
Administrations This Visit     triamcinolone acetonide (KENALOG-40) injection 40 mg     Admin Date  01/14/2020  13:36 Action  Given Dose  40 mg Route  Intramuscular Site  Dorsogluteal Right Administered By  Maria D Muro LPN    Ordering Provider:  MARGARET Fernández CNP    NDC:  5330-7304-63    Lot#:  [id number]    :  [employer] (PRIMARY CARE)    Patient Supplied?:  No    Comments:  1mLEXP MAR 2021                Patient instructed to report any adverse reaction to me immediately.
Encounter   Medications    cephALEXin (KEFLEX) 500 MG capsule     Sig: Take 1 capsule by mouth 3 times daily for 10 days     Dispense:  30 capsule     Refill:  0    albuterol sulfate  (90 Base) MCG/ACT inhaler     Sig: Inhale 2 puffs into the lungs every 6 hours as needed for Wheezing     Dispense:  1 Inhaler     Refill:  0    triamcinolone acetonide (KENALOG-40) injection 40 mg    benzonatate (TESSALON) 200 MG capsule     Sig: Take 1 capsule by mouth 3 times daily as needed for Cough     Dispense:  30 capsule     Refill:  0      See orders  Will notify pt of test results when they are available. If they are not notified they are to call office for the result    Patient given educational materials - seepatient instructions. Discussed use, benefit, and side effects of prescribed medications. All patient questions answered. Pt voiced understanding. Patient agreed withtreatment plan. Follow up as directed.      Electronically signed by MARGARET Hall CNP on 1/14/2020 at 5:39 PM

## 2020-03-24 RX ORDER — DILTIAZEM HYDROCHLORIDE 120 MG/1
CAPSULE, EXTENDED RELEASE ORAL
Qty: 90 CAPSULE | Refills: 0 | Status: SHIPPED | OUTPATIENT
Start: 2020-03-24 | End: 2020-07-13 | Stop reason: SDUPTHER

## 2020-04-27 RX ORDER — SPIRONOLACTONE AND HYDROCHLOROTHIAZIDE 25; 25 MG/1; MG/1
0.5 TABLET ORAL DAILY
Qty: 30 TABLET | Refills: 3 | Status: SHIPPED | OUTPATIENT
Start: 2020-04-27 | End: 2021-03-30 | Stop reason: SDUPTHER

## 2020-05-20 ENCOUNTER — TELEPHONE (OUTPATIENT)
Dept: CARDIOLOGY CLINIC | Age: 70
End: 2020-05-20

## 2020-05-21 ENCOUNTER — TELEMEDICINE (OUTPATIENT)
Dept: CARDIOLOGY CLINIC | Age: 70
End: 2020-05-21
Payer: MEDICARE

## 2020-05-21 PROCEDURE — G8417 CALC BMI ABV UP PARAM F/U: HCPCS | Performed by: INTERNAL MEDICINE

## 2020-05-21 PROCEDURE — 1123F ACP DISCUSS/DSCN MKR DOCD: CPT | Performed by: INTERNAL MEDICINE

## 2020-05-21 PROCEDURE — 3017F COLORECTAL CA SCREEN DOC REV: CPT | Performed by: INTERNAL MEDICINE

## 2020-05-21 PROCEDURE — G8400 PT W/DXA NO RESULTS DOC: HCPCS | Performed by: INTERNAL MEDICINE

## 2020-05-21 PROCEDURE — 1036F TOBACCO NON-USER: CPT | Performed by: INTERNAL MEDICINE

## 2020-05-21 PROCEDURE — G8428 CUR MEDS NOT DOCUMENT: HCPCS | Performed by: INTERNAL MEDICINE

## 2020-05-21 PROCEDURE — 4040F PNEUMOC VAC/ADMIN/RCVD: CPT | Performed by: INTERNAL MEDICINE

## 2020-05-21 PROCEDURE — 1090F PRES/ABSN URINE INCON ASSESS: CPT | Performed by: INTERNAL MEDICINE

## 2020-05-21 PROCEDURE — 99214 OFFICE O/P EST MOD 30 MIN: CPT | Performed by: INTERNAL MEDICINE

## 2020-05-21 NOTE — PROGRESS NOTES
remain in NSR for over one yr- post chemo and radiation  chads of 0, chads vasc 2 ( age and female)  EKG NSR Normal QTC  Rate control for now and consider rhythm control down the line  Cont. Cardizem cd 120 qd and flecanide  Remain in NSR for over one yr and will cont asa rather   OA considered and does not want it now  Check event mionitor_ revealed NSR  Cont asa 325 po qd  Any recurrence of atr fib she need OA     Need rhythm control and   Off  Flecanide 50 BID for drop in ef to 45  qtc wnl       Leg edema +1- RESOLVED  Aldactazide 1/2 po qod       D/w the pat the plan of care AT LENGTH     Echo and ekg reviewed and doing well    Pursuant to the emergency declaration under the [address] and the [facility]5 waiver authority and the [employer] Act, this Virtual  Visit was conducted, with patient's consent, to reduce the patient's risk of exposure to COVID-19 and provide continuity of care for an established patient. Services were provided through a video synchronous discussion virtually to substitute for in-person clinic visit. Greater then 25 minutes of time was spent reviewing the chart and educating the patient about cardiac status, medications, diet, exercise, and discussing the plan of care. I personally spent more then 50% of the appt time face to face with the patient counseling/coordinating patient's care.     RTC 4 MONTHS      [facility]

## 2020-06-01 ENCOUNTER — HOSPITAL ENCOUNTER (OUTPATIENT)
Dept: WOMENS IMAGING | Age: 70
Discharge: HOME | End: 2020-06-01
Payer: MEDICARE

## 2020-06-01 PROCEDURE — 77063 BREAST TOMOSYNTHESIS BI: CPT

## 2020-06-11 ENCOUNTER — HOSPITAL ENCOUNTER (OUTPATIENT)
Dept: INFUSION THERAPY | Age: 70
Discharge: HOME | End: 2020-06-11
Payer: MEDICARE

## 2020-06-11 ENCOUNTER — OFFICE VISIT (OUTPATIENT)
Dept: ONCOLOGY | Age: 70
End: 2020-06-11
Payer: MEDICARE

## 2020-06-11 VITALS
BODY MASS INDEX: 25.95 KG/M2 | OXYGEN SATURATION: 98 % | RESPIRATION RATE: 18 BRPM | WEIGHT: 152 LBS | HEIGHT: 64 IN | SYSTOLIC BLOOD PRESSURE: 133 MMHG | TEMPERATURE: 98.1 F | HEART RATE: 65 BPM | DIASTOLIC BLOOD PRESSURE: 61 MMHG

## 2020-06-11 DIAGNOSIS — Z85.3 HISTORY OF BREAST CANCER: ICD-10-CM

## 2020-06-11 LAB
ALBUMIN SERPL-MCNC: 4.1 G/DL (ref 3.5–5.1)
ALP BLD-CCNC: 97 U/L (ref 38–126)
ALT SERPL-CCNC: 18 U/L (ref 11–66)
AST SERPL-CCNC: 23 U/L (ref 5–40)
BILIRUB SERPL-MCNC: 0.2 MG/DL (ref 0.3–1.2)
BILIRUBIN DIRECT: < 0.2 MG/DL (ref 0–0.3)
BUN BLDV-MCNC: 15 MG/DL (ref 7–22)
CHLORIDE, WHOLE BLOOD: 104 MEQ/L (ref 98–109)
CO2: 25 MEQ/L (ref 23–33)
CREATININE, WHOLE BLOOD: 1.1 MG/DL (ref 0.5–1.2)
GFR, ESTIMATED: 52 ML/MIN/1.73M2
GLUCOSE, WHOLE BLOOD: 86 MG/DL (ref 70–108)
HCT VFR BLD CALC: 41.9 % (ref 37–47)
HEMOGLOBIN: 13.4 GM/DL (ref 12–16)
IONIZED CALCIUM, WHOLE BLOOD: 1.17 MMOL/L (ref 1.12–1.32)
MCH RBC QN AUTO: 30.2 PG (ref 26–33)
MCHC RBC AUTO-ENTMCNC: 32 GM/DL (ref 32.2–35.5)
MCV RBC AUTO: 94 FL (ref 81–99)
PDW BLD-RTO: 12.7 % (ref 11.5–14.5)
PLATELET # BLD: 256 THOU/MM3 (ref 130–400)
PMV BLD AUTO: 9.5 FL (ref 9.4–12.4)
POTASSIUM, WHOLE BLOOD: 4 MEQ/L (ref 3.5–4.9)
RBC # BLD: 4.44 MILL/MM3 (ref 4.2–5.4)
SODIUM, WHOLE BLOOD: 141 MEQ/L (ref 138–146)
TOTAL PROTEIN: 6.7 G/DL (ref 6.1–8)
WBC # BLD: 7.8 THOU/MM3 (ref 4.8–10.8)

## 2020-06-11 PROCEDURE — 84520 ASSAY OF UREA NITROGEN: CPT

## 2020-06-11 PROCEDURE — 85027 COMPLETE CBC AUTOMATED: CPT

## 2020-06-11 PROCEDURE — 80076 HEPATIC FUNCTION PANEL: CPT

## 2020-06-11 PROCEDURE — G8417 CALC BMI ABV UP PARAM F/U: HCPCS | Performed by: INTERNAL MEDICINE

## 2020-06-11 PROCEDURE — 82374 ASSAY BLOOD CARBON DIOXIDE: CPT

## 2020-06-11 PROCEDURE — 99211 OFF/OP EST MAY X REQ PHY/QHP: CPT

## 2020-06-11 PROCEDURE — 1036F TOBACCO NON-USER: CPT | Performed by: INTERNAL MEDICINE

## 2020-06-11 PROCEDURE — 3017F COLORECTAL CA SCREEN DOC REV: CPT | Performed by: INTERNAL MEDICINE

## 2020-06-11 PROCEDURE — 99213 OFFICE O/P EST LOW 20 MIN: CPT | Performed by: INTERNAL MEDICINE

## 2020-06-11 PROCEDURE — G8400 PT W/DXA NO RESULTS DOC: HCPCS | Performed by: INTERNAL MEDICINE

## 2020-06-11 PROCEDURE — 80047 BASIC METABLC PNL IONIZED CA: CPT

## 2020-06-11 PROCEDURE — 1123F ACP DISCUSS/DSCN MKR DOCD: CPT | Performed by: INTERNAL MEDICINE

## 2020-06-11 PROCEDURE — 4040F PNEUMOC VAC/ADMIN/RCVD: CPT | Performed by: INTERNAL MEDICINE

## 2020-06-11 PROCEDURE — 36415 COLL VENOUS BLD VENIPUNCTURE: CPT

## 2020-06-11 PROCEDURE — G8427 DOCREV CUR MEDS BY ELIG CLIN: HCPCS | Performed by: INTERNAL MEDICINE

## 2020-06-11 PROCEDURE — 1090F PRES/ABSN URINE INCON ASSESS: CPT | Performed by: INTERNAL MEDICINE

## 2020-07-13 RX ORDER — DILTIAZEM HYDROCHLORIDE 120 MG/1
CAPSULE, COATED, EXTENDED RELEASE ORAL
Qty: 90 CAPSULE | Refills: 0 | Status: SHIPPED | OUTPATIENT
Start: 2020-07-13 | End: 2020-09-24 | Stop reason: SDUPTHER

## 2020-09-24 ENCOUNTER — OFFICE VISIT (OUTPATIENT)
Dept: CARDIOLOGY CLINIC | Age: 70
End: 2020-09-24
Payer: MEDICARE

## 2020-09-24 VITALS
HEART RATE: 64 BPM | WEIGHT: 156 LBS | BODY MASS INDEX: 26.63 KG/M2 | SYSTOLIC BLOOD PRESSURE: 129 MMHG | HEIGHT: 64 IN | DIASTOLIC BLOOD PRESSURE: 72 MMHG

## 2020-09-24 PROCEDURE — 93000 ELECTROCARDIOGRAM COMPLETE: CPT | Performed by: INTERNAL MEDICINE

## 2020-09-24 PROCEDURE — 1123F ACP DISCUSS/DSCN MKR DOCD: CPT | Performed by: INTERNAL MEDICINE

## 2020-09-24 PROCEDURE — G8400 PT W/DXA NO RESULTS DOC: HCPCS | Performed by: INTERNAL MEDICINE

## 2020-09-24 PROCEDURE — 1036F TOBACCO NON-USER: CPT | Performed by: INTERNAL MEDICINE

## 2020-09-24 PROCEDURE — 1090F PRES/ABSN URINE INCON ASSESS: CPT | Performed by: INTERNAL MEDICINE

## 2020-09-24 PROCEDURE — 4040F PNEUMOC VAC/ADMIN/RCVD: CPT | Performed by: INTERNAL MEDICINE

## 2020-09-24 PROCEDURE — 3017F COLORECTAL CA SCREEN DOC REV: CPT | Performed by: INTERNAL MEDICINE

## 2020-09-24 PROCEDURE — 99214 OFFICE O/P EST MOD 30 MIN: CPT | Performed by: INTERNAL MEDICINE

## 2020-09-24 PROCEDURE — G8417 CALC BMI ABV UP PARAM F/U: HCPCS | Performed by: INTERNAL MEDICINE

## 2020-09-24 PROCEDURE — G8427 DOCREV CUR MEDS BY ELIG CLIN: HCPCS | Performed by: INTERNAL MEDICINE

## 2020-09-24 RX ORDER — DILTIAZEM HYDROCHLORIDE 120 MG/1
CAPSULE, COATED, EXTENDED RELEASE ORAL
Qty: 90 CAPSULE | Refills: 3 | Status: SHIPPED | OUTPATIENT
Start: 2020-09-24 | End: 2021-09-22

## 2020-09-24 NOTE — PROGRESS NOTES
Chief Complaint   Patient presents with    Follow-up     4 month f/u   Originally Seen and evaluated in the hospital for for PNA and small pericardial effusionm  And discharged   Came today for F/U        Pt here for cardiac clearance - eyelid surgery    EKG done today    Had leg edema in the last 2 months    No wt gain    NO  Palpitations    Chronic sob on exertion stairs- stable no wrosening    No cp      Denies chest pain, palpitations,  dizziness, edema    Patient Seen, Chart, Consults notes, Labs, Radiology studies reviewed.         Patient Active Problem List   Diagnosis    Breast cancer metastasized to axillary lymph node ([address]    Encounter for antineoplastic chemotherapy    Anemia    Leukopenia    Encounter for care related to Port-a-Cath    Febrile illness, acute    Pneumonia    Pleural effusion    Paroxysmal A-fib (HCC)    Abnormal EKG    Elevated alkaline phosphatase level    Generalized weakness    Leukocytosis    Weakness    Use of anastrozole (Arimidex)    PAF (paroxysmal atrial fibrillation) (HCC)    Bilateral leg edema- dependant now +1 from trace    S/P cardiac cath- 7/18/17- LM-P, LAD MID AND DISTAL 30%, LCX SMALL PATENT , RCA LARGED PATENT, EDP 5 MMHG, EF 60%- MD RX    Breast cancer metastasized to axillary lymph node, left (HCC)    LBBB (left bundle branch block)    SOB (shortness of breath) on exertion    Cardiomyopathy, probable nonischemic ([address]       Past Surgical History:   Procedure Laterality Date    BREAST LUMPECTOMY Left 06/26/2014    Needle Loc x2 with Axillary node dissection    BUNIONECTOMY      CARPAL TUNNEL RELEASE Right 11/28/2016    COLONOSCOPY  12/02/2016    ROTATOR CUFF REPAIR Right 08/03/2017        TONSILLECTOMY AND ADENOIDECTOMY Bilateral        No Known Allergies     Family History   Problem Relation Age of Onset    Cancer Mother    Ty Galeana Sclerosis Mother     Alcohol Abuse Father     Cancer Father         Social History Socioeconomic History    Marital status:      Spouse name: Not on file    Number of children: 3    Years of education: Not on file    Highest education level: Not on file   Occupational History    Not on file   Social Needs    Financial resource strain: Not on file    Food insecurity     Worry: Not on file     Inability: Not on file    Transportation needs     Medical: Not on file     Non-medical: Not on file   Tobacco Use    Smoking status: Former Smoker     Packs/day: 0.25     Years: 25.00     Pack years: 6.25     Last attempt to quit: 1/1/2005     Years since quitting: 15.7    Smokeless tobacco: Never Used   Substance and Sexual Activity    Alcohol use:  Yes     Alcohol/week: 0.0 standard drinks     Comment: social    Drug use: No    Sexual activity: Not on file   Lifestyle    Physical activity     Days per week: Not on file     Minutes per session: Not on file    Stress: Not on file   Relationships    Social connections     Talks on phone: Not on file     Gets together: Not on file     Attends Quaker service: Not on file     Active member of club or organization: Not on file     Attends meetings of clubs or organizations: Not on file     Relationship status: Not on file    Intimate partner violence     Fear of current or ex partner: Not on file     Emotionally abused: Not on file     Physically abused: Not on file     Forced sexual activity: Not on file   Other Topics Concern    Not on file   Social History Narrative    Not on file       Current Outpatient Prescriptions   Medication Sig Dispense Refill    calcium carbonate (OSCAL) 500 MG TABS tablet Take 500 mg by mouth 2 times daily      anastrozole (ARIMIDEX) 1 MG tablet take 1 tablet by mouth once daily REQUEST FOR 90 DAY RX 90 tablet 3    diltiazem (CARDIZEM SR) 120 MG SR capsule Take 1 capsule by mouth daily 60 capsule 3    flecainide (TAMBOCOR) 50 MG tablet TAKE 1 TABLET TWICE A  tablet 3    Multiple Vitamins-Minerals (MULTIVITAMIN PO) Take  by mouth daily.  aspirin 325 MG EC tablet Take 1 tablet by mouth daily. 30 tablet 3    acetaminophen 650 MG TABS Take 650 mg by mouth every 4 hours as needed. 120 tablet 3     No current facility-administered medications for this visit. Review of Systems -     General ROS: negative  Psychological ROS: negative  Hematological and Lymphatic ROS: No history of blood clots or bleeding disorder. Respiratory ROS: no cough, shortness of breath, or wheezing  Cardiovascular ROS: no chest pain or dyspnea on exertion  Gastrointestinal ROS: negative  Genito-Urinary ROS: no dysuria, trouble voiding, or hematuria  Musculoskeletal ROS: negative  Neurological ROS: no TIA or stroke symptoms  Dermatological ROS: negative      Blood pressure 129/72, pulse 64, height 5' 4\" (1.626 m), weight 156 lb (70.8 kg), not currently breastfeeding. Physical Examination:    General appearance - alert, well appearing, and in no distress  Mental status - alert, oriented to person, place, and time  Neck - supple, no significant adenopathy, no JVD, or carotid bruits  Chest - clear to auscultation, no wheezes, rales or rhonchi, symmetric air entry  Heart - normal rate, regular rhythm, normal S1, S2, no murmurs, rubs, clicks or gallops  Abdomen - soft, nontender, nondistended, no masses or organomegaly  Neurological - alert, oriented, normal speech, no focal findings or movement disorder noted  Musculoskeletal - no joint tenderness, deformity or swelling  Extremities - peripheral pulses normal, no pedal edema, no clubbing or cyanosis  Skin - normal coloration and turgor, no rashes, no suspicious skin lesions noted    Lab  No results for input(s): CKTOTAL, CKMB, CKMBINDEX, TROPONINI in the last 72 hours.   CBC:   Lab Results   Component Value Date    WBC 7.8 06/11/2020    RBC 4.44 06/11/2020    HGB 13.4 06/11/2020    HCT 41.9 06/11/2020    MCV 94 06/11/2020    MCH 30.2 06/11/2020    MCHC 32.0 06/11/2020    RDW 12.7 06/11/2020     06/11/2020    MPV 9.5 06/11/2020     BMP:    Lab Results   Component Value Date     06/11/2020     07/18/2017    K 4.0 06/11/2020    K 4.2 07/18/2017     07/18/2017    CO2 25 06/11/2020    BUN 15 06/11/2020    LABALBU 4.1 06/11/2020    CREATININE 1.1 06/11/2020    CREATININE 0.6 07/18/2017    CALCIUM 9.4 07/18/2017    LABGLOM >90 07/18/2017    GLUCOSE 86 07/18/2017     Hepatic Function Panel:    Lab Results   Component Value Date    ALKPHOS 97 06/11/2020    ALT 18 06/11/2020    AST 23 06/11/2020    PROT 6.7 06/11/2020    BILITOT 0.2 06/11/2020    BILIDIR <0.2 06/11/2020    LABALBU 4.1 06/11/2020     Magnesium:    Lab Results   Component Value Date    MG 2.1 11/17/2014     Warfarin PT/INR:    No components found for: PTPATWAR,  PTINRWAR  HgBA1c:    No results found for: LABA1C  FLP:    Lab Results   Component Value Date    TRIG 67 04/02/2018     04/02/2018    LDLCALC 100 04/02/2018     TSH:    Lab Results   Component Value Date    TSH 1.080 11/12/2014       EKG 12/4/14-Sinus  Rhythm   -  Nonspecific T-abnormality. ABNORMAL  msec    SUMMARY:    Left ventricle:  Size was normal.  Systolic function was normal. Ejection fraction was estimated in the range   of 55 % to 65 %. There were no regional wall motion abnormalities. Left atrium:  The atrium was mildly dilated. Pericardium:  A small, loculated pericardial effusion was identified circumferential to   the heart. The fluid had no internal echoes. There was no evidence of hemodynamic compromise. COMPARISONS:  Comparison was made with the previous study of 12-Nov-2014. Pericardial   effusion appearance has not changed. Prepared and signed by    Grant Arana MD    EKG 3/3./15-Sinus  Rhythm    -  Nonspecific T-abnormality. No acute changes  ABNORMAL   msec    EKG 7/7/15-  Sinus  Rhythm   -  Negative precordial T-waves.    WITHIN NORMAL LIMITS    EKG 12/7/15  Sinus  Rhythm -  Nonspecific T-abnormality. QTC  msec  ABNORMAL     EKG 6/12/17  NSR, prwp, no acute changed  QTC      Conclusions      Summary   Lexiscan EKG stress test is not suggestive for ischemia.   Calculated gated LVEF 69 %.   The T.I.D. ratio was 1.3 .   There was a small sized, mildly severe, reversible myocardial perfusion   defect of the apex and distal anterior wall.   The nuclear images is suggestive for mild myocardial ischemia in the apex   and distal segment of anterior wall.      Recommendation   Clinical correlation is recommended.      Signatures      ----------------------------------------------------------------   Electronically signed by Lacey Gutierrez MD (Interpreting   Cardiologist) on 06/26/2017 at 19:33   ----------------------------------------------------------------    ekg  9/10/18  Sinus  Rhythm   WITHIN NORMAL LIMITS   msec        Event monitor  CONCLUSION:  This is a benign event monitor finding with normal sinus  rhythm. No evidence of atrial fibrillation. Heart rate ranging from 72 to  100 beats per minute. No other form of arrhythmia noted.     For sure, there is no evidence of atrial fibrillation.      CHUY Castellanos M.D.     D: 04/15/2018 13:04:28    EKG 3/11/19  NSR, LBBB  The LBBB is new compared to ekg sept 2018  No hx of cp     MSEC    EKG 4/17/19  Sinus  Rhythm   -  Nonspecific T-abnormality.    ABNORMAL   No more LBBB      Conclusions      Summary   Left ventricle size is normal.   Normal left ventricular wall thickness.   There was moderate global hypokinesis of the left ventricle.   Systolic function was moderately reduced.   Ejection fraction is visually estimated at 45%.   Doppler parameters were consistent with abnormal left ventricular   relaxation (grade 1 diastolic dysfunction).   Doppler parameters were consistent with abnormal left ventricular   relaxation (grade 1 diastolic dysfunction).   The left atrium is Mildly dilated.   Moderate mitral regurgitation is present.      Signature      ----------------------------------------------------------------   Electronically signed by Lexis Melendez MD (Interpreting   physician) on 03/27/2019 at 07:02 PM      Conclusions      Summary   No ischemic EKG changes.   The nuclear images is suggestive for mild myocardial ischemia in the apex   and distal anterior walls. Gated EF 60%      Recommendation   Clinical correlation is recommended due to poor image quality.      Signatures      ----------------------------------------------------------------   Electronically signed by Lexis Melendez MD (Interpreting   Cardiologist) on 05/07/2019 at 21:06   ----------------------------------------------------------------  EKG 9/13/19  NSR, no acute abn    ekg 9/24/2020  NSR, NO acute abn    Assessment     Diagnosis Orders   1. Cardiomyopathy, probable nonischemic (HCC)  Basic Metabolic Panel    Magnesium    ECHO Complete 2D W Doppler W Color   2. SOB (shortness of breath) on exertion  EKG 12 Lead    Basic Metabolic Panel    Magnesium   3. LBBB (left bundle branch block)  Basic Metabolic Panel    Magnesium    ECHO Complete 2D W Doppler W Color   4. Bilateral leg edema- dependant now +1 from trace  Basic Metabolic Panel    Magnesium   5. Abnormal EKG  Basic Metabolic Panel    Magnesium   6. PAF (paroxysmal atrial fibrillation) ([facility])  Basic Metabolic Panel    Magnesium    ECHO Complete 2D W Doppler W Color   7.  S/P cardiac cath- 7/18/17- LM-P, LAD MID AND DISTAL 30%, LCX SMALL PATENT , RCA LARGED PATENT, EDP 5 MMHG, EF 60%- MD RX  Basic Metabolic Panel    Magnesium    ECHO Complete 2D W Doppler W Color         Previous admission DX  New onset AFB: CVR now Less than 24 hours now IN NSR  CHADS2= 0  TSH, potassium and mag normal No Hx TEMI    Now moderate pericardial effusion by CT done this am  Small pericardial effusion by echo    EKG changes: new T wave inversions leads V4-V6  - trend top      Pleural effusion: s\p Lt

## 2020-10-01 ENCOUNTER — HOSPITAL ENCOUNTER (OUTPATIENT)
Age: 70
Discharge: HOME | End: 2020-10-01
Payer: MEDICARE

## 2020-10-01 DIAGNOSIS — R06.02 SOB (SHORTNESS OF BREATH) ON EXERTION: ICD-10-CM

## 2020-10-01 DIAGNOSIS — I42.8 CARDIOMYOPATHY, NONISCHEMIC (HCC): ICD-10-CM

## 2020-10-01 DIAGNOSIS — I44.7 LBBB (LEFT BUNDLE BRANCH BLOCK): ICD-10-CM

## 2020-10-01 DIAGNOSIS — R94.31 ABNORMAL EKG: ICD-10-CM

## 2020-10-01 DIAGNOSIS — I48.0 PAF (PAROXYSMAL ATRIAL FIBRILLATION) (HCC): ICD-10-CM

## 2020-10-01 DIAGNOSIS — R60.0 BILATERAL LEG EDEMA: ICD-10-CM

## 2020-10-01 DIAGNOSIS — Z98.890 S/P CARDIAC CATH: ICD-10-CM

## 2020-10-01 PROCEDURE — 80048 BASIC METABOLIC PNL TOTAL CA: CPT

## 2020-10-01 PROCEDURE — 36415 COLL VENOUS BLD VENIPUNCTURE: CPT

## 2020-10-01 PROCEDURE — 83735 ASSAY OF MAGNESIUM: CPT

## 2020-10-02 LAB
ANION GAP SERPL CALCULATED.3IONS-SCNC: 12 MEQ/L (ref 8–16)
BUN BLDV-MCNC: 15 MG/DL (ref 7–22)
CALCIUM SERPL-MCNC: 9.7 MG/DL (ref 8.5–10.5)
CHLORIDE BLD-SCNC: 98 MEQ/L (ref 98–111)
CO2: 29 MEQ/L (ref 23–33)
CREAT SERPL-MCNC: 0.8 MG/DL (ref 0.4–1.2)
GFR SERPL CREATININE-BSD FRML MDRD: 71 ML/MIN/1.73M2
GLUCOSE BLD-MCNC: 99 MG/DL (ref 70–108)
MAGNESIUM: 1.8 MG/DL (ref 1.6–2.4)
POTASSIUM SERPL-SCNC: 3.9 MEQ/L (ref 3.5–5.2)
SODIUM BLD-SCNC: 139 MEQ/L (ref 135–145)

## 2020-10-12 ENCOUNTER — HOSPITAL ENCOUNTER (OUTPATIENT)
Dept: NON INVASIVE DIAGNOSTICS | Age: 70
Discharge: HOME | End: 2020-10-12
Payer: MEDICARE

## 2020-10-12 LAB
LV EF: 63 %
LVEF MODALITY: NORMAL

## 2020-10-12 PROCEDURE — 93306 TTE W/DOPPLER COMPLETE: CPT

## 2020-10-27 ENCOUNTER — TELEPHONE (OUTPATIENT)
Dept: FAMILY MEDICINE CLINIC | Age: 70
End: 2020-10-27

## 2020-10-27 ENCOUNTER — HOSPITAL ENCOUNTER (OUTPATIENT)
Age: 70
Discharge: HOME | End: 2020-10-27
Payer: MEDICARE

## 2020-10-27 DIAGNOSIS — Z20.822 ENCOUNTER FOR LABORATORY TESTING FOR COVID-19 VIRUS: ICD-10-CM

## 2020-10-27 PROCEDURE — U0003 INFECTIOUS AGENT DETECTION BY NUCLEIC ACID (DNA OR RNA); SEVERE ACUTE RESPIRATORY SYNDROME CORONAVIRUS 2 (SARS-COV-2) (CORONAVIRUS DISEASE [COVID-19]), AMPLIFIED PROBE TECHNIQUE, MAKING USE OF HIGH THROUGHPUT TECHNOLOGIES AS DESCRIBED BY CMS-2020-01-R: HCPCS

## 2020-10-27 NOTE — TELEPHONE ENCOUNTER
Pt called the office stating that she has been exposed to someone with covid. She stated that last known exposure was 10/14. Pt states that she is having a slight cough and some SOB. Orders have been placed, pt is aware of [facility] protocol regarding testing.

## 2020-10-29 LAB — SARS-COV-2: NOT DETECTED

## 2021-03-30 ENCOUNTER — OFFICE VISIT (OUTPATIENT)
Dept: CARDIOLOGY CLINIC | Age: 71
End: 2021-03-30
Payer: MEDICARE

## 2021-03-30 VITALS
HEART RATE: 67 BPM | WEIGHT: 160.2 LBS | HEIGHT: 64 IN | BODY MASS INDEX: 27.35 KG/M2 | SYSTOLIC BLOOD PRESSURE: 138 MMHG | DIASTOLIC BLOOD PRESSURE: 63 MMHG

## 2021-03-30 DIAGNOSIS — I48.0 PAF (PAROXYSMAL ATRIAL FIBRILLATION) (HCC): ICD-10-CM

## 2021-03-30 DIAGNOSIS — I42.8 CARDIOMYOPATHY, NONISCHEMIC (HCC): Primary | ICD-10-CM

## 2021-03-30 DIAGNOSIS — I44.7 LBBB (LEFT BUNDLE BRANCH BLOCK): ICD-10-CM

## 2021-03-30 DIAGNOSIS — R60.0 BILATERAL LEG EDEMA: ICD-10-CM

## 2021-03-30 DIAGNOSIS — Z98.890 S/P CARDIAC CATH: ICD-10-CM

## 2021-03-30 DIAGNOSIS — R94.31 ABNORMAL EKG: ICD-10-CM

## 2021-03-30 PROCEDURE — 3017F COLORECTAL CA SCREEN DOC REV: CPT | Performed by: INTERNAL MEDICINE

## 2021-03-30 PROCEDURE — 1090F PRES/ABSN URINE INCON ASSESS: CPT | Performed by: INTERNAL MEDICINE

## 2021-03-30 PROCEDURE — G8427 DOCREV CUR MEDS BY ELIG CLIN: HCPCS | Performed by: INTERNAL MEDICINE

## 2021-03-30 PROCEDURE — 4040F PNEUMOC VAC/ADMIN/RCVD: CPT | Performed by: INTERNAL MEDICINE

## 2021-03-30 PROCEDURE — 1123F ACP DISCUSS/DSCN MKR DOCD: CPT | Performed by: INTERNAL MEDICINE

## 2021-03-30 PROCEDURE — G8417 CALC BMI ABV UP PARAM F/U: HCPCS | Performed by: INTERNAL MEDICINE

## 2021-03-30 PROCEDURE — 1036F TOBACCO NON-USER: CPT | Performed by: INTERNAL MEDICINE

## 2021-03-30 PROCEDURE — 99214 OFFICE O/P EST MOD 30 MIN: CPT | Performed by: INTERNAL MEDICINE

## 2021-03-30 PROCEDURE — G8484 FLU IMMUNIZE NO ADMIN: HCPCS | Performed by: INTERNAL MEDICINE

## 2021-03-30 PROCEDURE — G8400 PT W/DXA NO RESULTS DOC: HCPCS | Performed by: INTERNAL MEDICINE

## 2021-03-30 RX ORDER — IBUPROFEN 200 MG
200 TABLET ORAL EVERY 6 HOURS PRN
COMMUNITY

## 2021-03-30 RX ORDER — SPIRONOLACTONE AND HYDROCHLOROTHIAZIDE 25; 25 MG/1; MG/1
0.5 TABLET ORAL DAILY
Qty: 45 TABLET | Refills: 2 | Status: SHIPPED | OUTPATIENT
Start: 2021-03-30 | End: 2022-03-28 | Stop reason: SDUPTHER

## 2021-03-30 NOTE — PROGRESS NOTES
Chief Complaint   Patient presents with    6 Month Follow-Up    Atrial Fibrillation   Originally Seen and evaluated in the hospital for for PNA and small pericardial effusionm  And discharged   Came today for F/U      6 month fu    Denies chest pain, dizziness or palpitations    Sob on exertion chronic    Leg edema chronic    Gained 4 lb in 6 months    Leg edema +1    Varicose vein    C/o sob with exertion, occasional palpitations, swelling in ankles    Last EKG 9-      Patient Seen, Chart, Consults notes, Labs, Radiology studies reviewed.         Patient Active Problem List   Diagnosis    Breast cancer metastasized to axillary lymph node ([address]    Encounter for antineoplastic chemotherapy    Anemia    Leukopenia    Encounter for care related to Port-a-Cath    Febrile illness, acute    Pneumonia    Pleural effusion    Paroxysmal A-fib (HCC)    Abnormal EKG    Elevated alkaline phosphatase level    Generalized weakness    Leukocytosis    Weakness    Use of anastrozole (Arimidex)    PAF (paroxysmal atrial fibrillation) (HCC)    Bilateral leg edema- dependant now +1 from trace    S/P cardiac cath- 7/18/17- LM-P, LAD MID AND DISTAL 30%, LCX SMALL PATENT , RCA LARGED PATENT, EDP 5 MMHG, EF 60%- MD RX    Breast cancer metastasized to axillary lymph node, left (HCC)    LBBB (left bundle branch block)    SOB (shortness of breath) on exertion    Cardiomyopathy, probable nonischemic ([address]       Past Surgical History:   Procedure Laterality Date    BREAST LUMPECTOMY Left 06/26/2014    Needle Loc x2 with Axillary node dissection    BUNIONECTOMY      CARPAL TUNNEL RELEASE Right 11/28/2016    COLONOSCOPY  12/02/2016    ROTATOR CUFF REPAIR Right 08/03/2017        TONSILLECTOMY AND ADENOIDECTOMY Bilateral        No Known Allergies     Family History   Problem Relation Age of Onset    Cancer Mother    Reino Montague Sclerosis Mother     Alcohol Abuse Father     Cancer Father         Social History     Socioeconomic History    Marital status:      Spouse name: Not on file    Number of children: 3    Years of education: Not on file    Highest education level: Not on file   Occupational History    Not on file   Social Needs    Financial resource strain: Not on file    Food insecurity     Worry: Not on file     Inability: Not on file    Transportation needs     Medical: Not on file     Non-medical: Not on file   Tobacco Use    Smoking status: Former Smoker     Packs/day: 0.25     Years: 25.00     Pack years: 6.25     Quit date: 2005     Years since quittin.2    Smokeless tobacco: Never Used   Substance and Sexual Activity    Alcohol use:  Yes     Alcohol/week: 0.0 standard drinks     Comment: social    Drug use: No    Sexual activity: Not on file   Lifestyle    Physical activity     Days per week: Not on file     Minutes per session: Not on file    Stress: Not on file   Relationships    Social connections     Talks on phone: Not on file     Gets together: Not on file     Attends Islam service: Not on file     Active member of club or organization: Not on file     Attends meetings of clubs or organizations: Not on file     Relationship status: Not on file    Intimate partner violence     Fear of current or ex partner: Not on file     Emotionally abused: Not on file     Physically abused: Not on file     Forced sexual activity: Not on file   Other Topics Concern    Not on file   Social History Narrative    Not on file       Current Outpatient Prescriptions   Medication Sig Dispense Refill    calcium carbonate (OSCAL) 500 MG TABS tablet Take 500 mg by mouth 2 times daily      anastrozole (ARIMIDEX) 1 MG tablet take 1 tablet by mouth once daily REQUEST FOR 90 DAY RX 90 tablet 3    diltiazem (CARDIZEM SR) 120 MG SR capsule Take 1 capsule by mouth daily 60 capsule 3    flecainide (TAMBOCOR) 50 MG tablet TAKE 1 TABLET TWICE A  tablet 3    Multiple Vitamins-Minerals (MULTIVITAMIN PO) Take  by mouth daily.  aspirin 325 MG EC tablet Take 1 tablet by mouth daily. 30 tablet 3    acetaminophen 650 MG TABS Take 650 mg by mouth every 4 hours as needed. 120 tablet 3     No current facility-administered medications for this visit. Review of Systems -     General ROS: negative  Psychological ROS: negative  Hematological and Lymphatic ROS: No history of blood clots or bleeding disorder. Respiratory ROS: no cough, shortness of breath, or wheezing  Cardiovascular ROS: no chest pain or dyspnea on exertion  Gastrointestinal ROS: negative  Genito-Urinary ROS: no dysuria, trouble voiding, or hematuria  Musculoskeletal ROS: negative  Neurological ROS: no TIA or stroke symptoms  Dermatological ROS: negative      Blood pressure 138/63, pulse 67, height 5' 4\" (1.626 m), weight 160 lb 3.2 oz (72.7 kg), not currently breastfeeding. Physical Examination:    General appearance - alert, well appearing, and in no distress  Mental status - alert, oriented to person, place, and time  Neck - supple, no significant adenopathy, no JVD, or carotid bruits  Chest - clear to auscultation, no wheezes, rales or rhonchi, symmetric air entry  Heart - normal rate, regular rhythm, normal S1, S2, no murmurs, rubs, clicks or gallops  Abdomen - soft, nontender, nondistended, no masses or organomegaly  Neurological - alert, oriented, normal speech, no focal findings or movement disorder noted  Musculoskeletal - no joint tenderness, deformity or swelling  Extremities - peripheral pulses normal, no pedal edema, no clubbing or cyanosis  Skin - normal coloration and turgor, no rashes, no suspicious skin lesions noted    Lab  No results for input(s): CKTOTAL, CKMB, CKMBINDEX, TROPONINI in the last 72 hours.   CBC:   Lab Results   Component Value Date    WBC 7.8 06/11/2020    RBC 4.44 06/11/2020    HGB 13.4 06/11/2020    HCT 41.9 06/11/2020    MCV 94 06/11/2020    MCH 30.2 06/11/2020 MCHC 32.0 06/11/2020    RDW 12.7 06/11/2020     06/11/2020    MPV 9.5 06/11/2020     BMP:    Lab Results   Component Value Date     10/01/2020    K 3.9 10/01/2020    CL 98 10/01/2020    CO2 29 10/01/2020    BUN 15 10/01/2020    LABALBU 4.1 06/11/2020    CREATININE 0.8 10/01/2020    CALCIUM 9.7 10/01/2020    LABGLOM 71 10/01/2020    GLUCOSE 99 10/01/2020     Hepatic Function Panel:    Lab Results   Component Value Date    ALKPHOS 97 06/11/2020    ALT 18 06/11/2020    AST 23 06/11/2020    PROT 6.7 06/11/2020    BILITOT 0.2 06/11/2020    BILIDIR <0.2 06/11/2020    LABALBU 4.1 06/11/2020     Magnesium:    Lab Results   Component Value Date    MG 1.8 10/01/2020     Warfarin PT/INR:    No components found for: PTPATWAR,  PTINRWAR  HgBA1c:    No results found for: LABA1C  FLP:    Lab Results   Component Value Date    TRIG 67 04/02/2018     04/02/2018    LDLCALC 100 04/02/2018     TSH:    Lab Results   Component Value Date    TSH 1.080 11/12/2014       EKG 12/4/14-Sinus  Rhythm   -  Nonspecific T-abnormality. ABNORMAL  msec    SUMMARY:    Left ventricle:  Size was normal.  Systolic function was normal. Ejection fraction was estimated in the range   of 55 % to 65 %. There were no regional wall motion abnormalities. Left atrium:  The atrium was mildly dilated. Pericardium:  A small, loculated pericardial effusion was identified circumferential to   the heart. The fluid had no internal echoes. There was no evidence of hemodynamic compromise. COMPARISONS:  Comparison was made with the previous study of 12-Nov-2014. Pericardial   effusion appearance has not changed. Prepared and signed by    Darylene Bowling, MD    EKG 3/3./15-Sinus  Rhythm    -  Nonspecific T-abnormality. No acute changes  ABNORMAL   msec    EKG 7/7/15-  Sinus  Rhythm   -  Negative precordial T-waves. WITHIN NORMAL LIMITS    EKG 12/7/15  Sinus  Rhythm   -  Nonspecific T-abnormality.   QTC  msec  ABNORMAL EKG 6/12/17  NSR, prwp, no acute changed  QTC      Conclusions      Summary   Lexiscan EKG stress test is not suggestive for ischemia.   Calculated gated LVEF 69 %.   The T.I.D. ratio was 1.3 .   There was a small sized, mildly severe, reversible myocardial perfusion   defect of the apex and distal anterior wall.   The nuclear images is suggestive for mild myocardial ischemia in the apex   and distal segment of anterior wall.      Recommendation   Clinical correlation is recommended.      Signatures      ----------------------------------------------------------------   Electronically signed by Kraig Pham MD (Interpreting   Cardiologist) on 06/26/2017 at 19:33   ----------------------------------------------------------------    ekg  9/10/18  Sinus  Rhythm   WITHIN NORMAL LIMITS   msec        Event monitor  CONCLUSION:  This is a benign event monitor finding with normal sinus  rhythm. No evidence of atrial fibrillation. Heart rate ranging from 72 to  100 beats per minute. No other form of arrhythmia noted.     For sure, there is no evidence of atrial fibrillation.      CHUY ZAMORA TOBESOFT YONAS Stephens     D: 04/15/2018 13:04:28    EKG 3/11/19  NSR, LBBB  The LBBB is new compared to ekg sept 2018  No hx of cp     MSEC    EKG 4/17/19  Sinus  Rhythm   -  Nonspecific T-abnormality.    ABNORMAL   No more LBBB      Conclusions      Summary   Left ventricle size is normal.   Normal left ventricular wall thickness.   There was moderate global hypokinesis of the left ventricle.   Systolic function was moderately reduced.   Ejection fraction is visually estimated at 45%.   Doppler parameters were consistent with abnormal left ventricular   relaxation (grade 1 diastolic dysfunction).   Doppler parameters were consistent with abnormal left ventricular   relaxation (grade 1 diastolic dysfunction).   The left atrium is Mildly dilated.   Moderate mitral regurgitation is present.      Signature      ----------------------------------------------------------------   Electronically signed by Mir Rojo MD (Interpreting   physician) on 03/27/2019 at 07:02 PM      Conclusions      Summary   No ischemic EKG changes.   The nuclear images is suggestive for mild myocardial ischemia in the apex   and distal anterior walls. Gated EF 60%      Recommendation   Clinical correlation is recommended due to poor image quality.      Signatures      ----------------------------------------------------------------   Electronically signed by Mir Rojo MD (Interpreting   Cardiologist) on 05/07/2019 at 21:06   ----------------------------------------------------------------  EKG 9/13/19  NSR, no acute abn    ekg 9/24/2020  NSR, NO acute abn    Assessment     Diagnosis Orders   1. Cardiomyopathy, probable nonischemic ([facility]     2. PAF (paroxysmal atrial fibrillation) ([facility]     3. Bilateral leg edema- dependant now +1 from trace     4. LBBB (left bundle branch block)     5. S/P cardiac cath- 7/18/17- LM-P, LAD MID AND DISTAL 30%, LCX SMALL PATENT , RCA LARGED PATENT, EDP 5 MMHG, EF 60%- MD RX     6. Abnormal EKG           Previous admission DX  New onset AFB: CVR now Less than 24 hours now IN NSR  CHADS2= 0  TSH, potassium and mag normal No Hx TEMI    Now moderate pericardial effusion by CT done this am  Small pericardial effusion by echo    EKG changes: new T wave inversions leads V4-V6  - trend top      Pleural effusion: s\p Lt thoracentesis 11-16 with 225 ml off    viral syndrome- with possible pericarditis and pleurisy    possible pericarditis and pleurisy    Hx breast CA- s\p lumpectomy and chemo/radiation    Atypical CP upon admit- resolved now      Plan     The  Current current meds and labs reviewed      Still in nSR  ABN EKG LBBB 03/2019  Echo EF 45% and later 60%  NO CP   Sob on exertion      Cardiomyopathy: improving, no CHF symptoms, no change in clinical condition.  Will need periodic echocardiograms depending on symptoms. LBBB new and now resolved and EF 45% new drop from 60% prior  No cp  Sob on exertion not new per pat  Off  felcanide 50 bid  Abnormal nuclear stress test- poor image quaility mild ischemia probvaly artifact related - no cp- med RX  Cont lopressor 25 po bid      Continue the current treatment and with constant vigilance to changes in symptoms and also any potential side effects. Return for care or seek medical attention immediately if symptoms got worse and/or develop new symptoms. Limited echo revealed stable small pericardial effusion- resolved  Completed months of colchicine      PAF 2015 and remain in NSR for over two yr- post chemo and radiation  chads of 0, chads vasc 2 ( age and female)  EKG NSR Normal QTC  Rate control for now and consider rhythm control down the line  Cont. Cardizem cd 120 qd   Remain in NSR for over two yr and will cont asa rather   OA considered and she does not want it now  Check event mionitor_ revealed NSR  Cont asa 325 po qd  Any recurrence of atr fib she need OA and to be readdressed    Off  Flecanide 50 BID for drop in ef to 45      HOME BP ADVISED    Leg edema +2 - worse than befote used to n]=be =1  increase Aldactazide to 1 tab po qd for 4 days the 1/2 po qd      D/w the pat the plan of care    Echo and ekg reviewed and doing well    Discussed use, benefit, and side effects of prescribed medications. All patient questions answered. Pt voiced understanding. Instructed to continue current medications, diet and exercise. Continue risk factor modification and medical management. Patient agreed with treatment plan. Follow up as directed.       RTC in 6 months    Loida Little [facility]

## 2021-06-07 ENCOUNTER — HOSPITAL ENCOUNTER (OUTPATIENT)
Dept: WOMENS IMAGING | Age: 71
Discharge: HOME | End: 2021-06-07
Payer: MEDICARE

## 2021-06-07 DIAGNOSIS — Z85.3 PERSONAL HISTORY OF BREAST CANCER: ICD-10-CM

## 2021-06-07 DIAGNOSIS — Z12.31 ENCOUNTER FOR SCREENING MAMMOGRAM FOR HIGH-RISK PATIENT: ICD-10-CM

## 2021-06-07 PROCEDURE — 77063 BREAST TOMOSYNTHESIS BI: CPT

## 2021-06-17 ENCOUNTER — OFFICE VISIT (OUTPATIENT)
Dept: ONCOLOGY | Age: 71
End: 2021-06-17
Payer: MEDICARE

## 2021-06-17 ENCOUNTER — HOSPITAL ENCOUNTER (OUTPATIENT)
Dept: INFUSION THERAPY | Age: 71
Discharge: HOME | End: 2021-06-17
Payer: MEDICARE

## 2021-06-17 VITALS
SYSTOLIC BLOOD PRESSURE: 127 MMHG | BODY MASS INDEX: 23.63 KG/M2 | OXYGEN SATURATION: 96 % | RESPIRATION RATE: 16 BRPM | HEIGHT: 64 IN | HEART RATE: 71 BPM | DIASTOLIC BLOOD PRESSURE: 58 MMHG | TEMPERATURE: 98.5 F | WEIGHT: 138.4 LBS

## 2021-06-17 DIAGNOSIS — C50.912 CARCINOMA OF LEFT BREAST METASTATIC TO AXILLARY LYMPH NODE (HCC): Primary | ICD-10-CM

## 2021-06-17 DIAGNOSIS — C50.912 CARCINOMA OF LEFT BREAST METASTATIC TO AXILLARY LYMPH NODE (HCC): ICD-10-CM

## 2021-06-17 DIAGNOSIS — Z12.31 SCREENING MAMMOGRAM FOR HIGH-RISK PATIENT: ICD-10-CM

## 2021-06-17 DIAGNOSIS — C77.3 CARCINOMA OF LEFT BREAST METASTATIC TO AXILLARY LYMPH NODE (HCC): ICD-10-CM

## 2021-06-17 DIAGNOSIS — J90 PLEURAL EFFUSION: ICD-10-CM

## 2021-06-17 DIAGNOSIS — C77.3 CARCINOMA OF LEFT BREAST METASTATIC TO AXILLARY LYMPH NODE (HCC): Primary | ICD-10-CM

## 2021-06-17 LAB
ABSOLUTE IMMATURE GRANULOCYTE: 0.02 THOU/MM3 (ref 0–0.07)
ALBUMIN SERPL-MCNC: 3.9 G/DL (ref 3.5–5.1)
ALP BLD-CCNC: 105 U/L (ref 38–126)
ALT SERPL-CCNC: 18 U/L (ref 11–66)
AST SERPL-CCNC: 20 U/L (ref 5–40)
BASINOPHIL, AUTOMATED: 1 % (ref 0–3)
BASOPHILS ABSOLUTE: 0 THOU/MM3 (ref 0–0.1)
BILIRUB SERPL-MCNC: < 0.2 MG/DL (ref 0.3–1.2)
BILIRUBIN DIRECT: < 0.2 MG/DL (ref 0–0.3)
BUN, WHOLE BLOOD: 15 MG/DL (ref 8–26)
CHLORIDE, WHOLE BLOOD: 102 MEQ/L (ref 98–109)
CREATININE, WHOLE BLOOD: 0.8 MG/DL (ref 0.5–1.2)
EOSINOPHILS ABSOLUTE: 0.3 THOU/MM3 (ref 0–0.4)
EOSINOPHILS RELATIVE PERCENT: 4 % (ref 0–4)
GFR, ESTIMATED: 75 ML/MIN/1.73M2
GLUCOSE, WHOLE BLOOD: 115 MG/DL (ref 70–108)
HCT VFR BLD CALC: 41.5 % (ref 37–47)
HEMOGLOBIN: 13.4 GM/DL (ref 12–16)
IMMATURE GRANULOCYTES: 0 %
IONIZED CALCIUM, WHOLE BLOOD: 1.21 MMOL/L (ref 1.12–1.32)
LYMPHOCYTES # BLD: 29 % (ref 15–47)
LYMPHOCYTES ABSOLUTE: 2.1 THOU/MM3 (ref 1–4.8)
MCH RBC QN AUTO: 30 PG (ref 26–33)
MCHC RBC AUTO-ENTMCNC: 32.3 GM/DL (ref 32.2–35.5)
MCV RBC AUTO: 93 FL (ref 81–99)
MONOCYTES ABSOLUTE: 0.5 THOU/MM3 (ref 0.4–1.3)
MONOCYTES: 7 % (ref 0–12)
PDW BLD-RTO: 12.5 % (ref 11.5–14.5)
PLATELET # BLD: 299 THOU/MM3 (ref 130–400)
PMV BLD AUTO: 9.2 FL (ref 9.4–12.4)
POTASSIUM, WHOLE BLOOD: 4 MEQ/L (ref 3.5–4.9)
RBC # BLD: 4.46 MILL/MM3 (ref 4.2–5.4)
SEG NEUTROPHILS: 60 % (ref 43–75)
SEGMENTED NEUTROPHILS ABSOLUTE COUNT: 4.5 THOU/MM3 (ref 1.8–7.7)
SODIUM, WHOLE BLOOD: 140 MEQ/L (ref 138–146)
TOTAL CO2, WHOLE BLOOD: 31 MEQ/L (ref 23–33)
TOTAL PROTEIN: 6.7 G/DL (ref 6.1–8)
WBC # BLD: 7.5 THOU/MM3 (ref 4.8–10.8)

## 2021-06-17 PROCEDURE — G8400 PT W/DXA NO RESULTS DOC: HCPCS | Performed by: INTERNAL MEDICINE

## 2021-06-17 PROCEDURE — 3017F COLORECTAL CA SCREEN DOC REV: CPT | Performed by: INTERNAL MEDICINE

## 2021-06-17 PROCEDURE — 1036F TOBACCO NON-USER: CPT | Performed by: INTERNAL MEDICINE

## 2021-06-17 PROCEDURE — 1090F PRES/ABSN URINE INCON ASSESS: CPT | Performed by: INTERNAL MEDICINE

## 2021-06-17 PROCEDURE — 80047 BASIC METABLC PNL IONIZED CA: CPT

## 2021-06-17 PROCEDURE — G8420 CALC BMI NORM PARAMETERS: HCPCS | Performed by: INTERNAL MEDICINE

## 2021-06-17 PROCEDURE — 85025 COMPLETE CBC W/AUTO DIFF WBC: CPT

## 2021-06-17 PROCEDURE — 99211 OFF/OP EST MAY X REQ PHY/QHP: CPT

## 2021-06-17 PROCEDURE — G8427 DOCREV CUR MEDS BY ELIG CLIN: HCPCS | Performed by: INTERNAL MEDICINE

## 2021-06-17 PROCEDURE — 99213 OFFICE O/P EST LOW 20 MIN: CPT | Performed by: INTERNAL MEDICINE

## 2021-06-17 PROCEDURE — 4040F PNEUMOC VAC/ADMIN/RCVD: CPT | Performed by: INTERNAL MEDICINE

## 2021-06-17 PROCEDURE — 36415 COLL VENOUS BLD VENIPUNCTURE: CPT

## 2021-06-17 PROCEDURE — 1123F ACP DISCUSS/DSCN MKR DOCD: CPT | Performed by: INTERNAL MEDICINE

## 2021-06-17 PROCEDURE — 80076 HEPATIC FUNCTION PANEL: CPT

## 2021-06-17 NOTE — PROGRESS NOTES
[facility] PROFESSIONAL SERVICES  [facility]  [address] [facility] [address] [facility] [address]  [address] [address]  Dept: [phone]  Dept Fax: [phone]  Loc: [phone]       Subjective:      Chief Complaint: Jessy Zelaya is a 72 y.o. female with a history of breast cancer. She was noted to have some thickening in the upper outer quadrant of the left breast. On mammography, she had extremely dense breasts. There was a cluster fine calcifications in the left breast. An ultrasound was obtained and ultrasound guided biopsy was completed. She also had low density towards the left axilla which was suspicious for enlarged lymph node. Core biopsy of the left breast mass and the enlarged lymph node revealed invasive ductal carcinoma, nuclear grade 1-2 as well as metastatic ductal adenocarcinoma in the lymph node. The tumor was noted to be estrogen receptor positive (100%) and progesterone receptor positive (90%). HER-2-олег was not amplified. She subsequently did undergo an MRI to rule out additional lesions and CT scans of the chest, abdomen and pelvis to evaluate for metastatic disease. There is no obvious evidence of distant metastatic disease. There was ground glass attenuation along the right major fissure of the lung which could represent inflammation or scarring, but nodules cannot not be entirely excluded. MRI revealed a stable 7 mm lobular mass right breast 11 o'clock posterior depth, appeared benign. There was an additional 5 mm round focus in the left breast 11 o'clock posterior depth which had a low suspicion for malignancy, but an ultrasound guided biopsy of this area confirmed benign tissue. There was 2 cm mass left breast 2 o'clock posterior depth consistent with a known carcinoma, extended to but did not involve the pectoralis muscle. The patient was started in neoadjuvant chemotherapy. She completed chemotherapy, surgery and radiation therapy treatment.      HPI:   The patient is here today for follow examination. She is here for follow up of her history of breast cancer. Her overall health has been good. She has no signs or symptoms that are suggestive of recurrence of her breast cancer. The patient denies skeletal pain. She has not had fever or other signs of infection. The patient denies shortness of breath, chest pain, a change in bowel habits or a change in bladder habits. ECOG performance status is level 0. Her most recent mammogram was on 06/07/2021. This was reported as a benign appearing mammogram.  The results of the mammogram reviewed with the patient today. PMH, SH, and FH:  I reviewed the PMH, SH and FH as noted on the electronic medical record. There have been no changes as noted in the previous documentation. Review of Systems   Constitutional: Negative. HENT: Negative. Eyes: Negative. Respiratory: Negative. Cardiovascular: Negative. Gastrointestinal: Negative. Genitourinary: Negative. Musculoskeletal: Negative. Skin: Negative. Neurological: Negative. Hematological: Negative. Psychiatric/Behavioral: Negative. Objective:   Physical Exam   Vitals:    06/17/21 1303   BP: (!) 127/58   Pulse: 71   Resp: 16   Temp: 98.5 °F (36.9 °C)   SpO2: 96%   Vitals reviewed and are stable. Constitutional: Well-developed. No acute distress. HENT: Normocephalic and atraumatic. Eyes: Pupils appear equal and reactive. Neck: Overall appearance is symmetrical. No identifiable masses. Breasts: Bilateral breast exam displays no palpable abnormalities. Surgical incisions are well-healed. There is no evidence of any localized recurrence. Pulmonary: Effort normal. No respiratory distress. .  Neurological: Alert and oriented to person, place, and time. Judgment and thought content normal.  Skin: Skin is warm and dry. No rash. Psychiatric: Mood and affect appropriate for the clinical situation.       Data Analysis:    Hematology 6/17/2021 6/11/2020 11/14/2019   WBC 7.5 7.8 7.6   RBC 4.46 4.44 4.43   HGB 13.4 13.4 13.4   HCT 41.5 41.9 40.0   MCV 93 94 90   RDW 12.5 12.7 10.9 (L)    256 267     Assessment:   1. Breast cancer. 2.  History of unexplained pleural effusion. Plan:   1. Monitor for recurrence of malignancy. 2.  Continue annual mammogram.  3.  Monitor for recurrent pleural effusion. Anabell Curry M.D. Medical Director: [facility] [place] [address] [address] [facility], [address] [place] [address], [address] [place] [address], [address] [place] [address] of the [facility] at [facility]      **This report has been created using voice recognition software. It may contain minor errors which are inherent in voice recognition technology. **

## 2021-09-22 RX ORDER — DILTIAZEM HYDROCHLORIDE 120 MG/1
CAPSULE, COATED, EXTENDED RELEASE ORAL
Qty: 90 CAPSULE | Refills: 0 | Status: SHIPPED | OUTPATIENT
Start: 2021-09-22 | End: 2021-09-28 | Stop reason: SDUPTHER

## 2021-09-28 ENCOUNTER — OFFICE VISIT (OUTPATIENT)
Dept: CARDIOLOGY CLINIC | Age: 71
End: 2021-09-28
Payer: MEDICARE

## 2021-09-28 VITALS
DIASTOLIC BLOOD PRESSURE: 50 MMHG | WEIGHT: 155.4 LBS | HEIGHT: 64 IN | HEART RATE: 55 BPM | BODY MASS INDEX: 26.53 KG/M2 | SYSTOLIC BLOOD PRESSURE: 115 MMHG

## 2021-09-28 DIAGNOSIS — I42.8 CARDIOMYOPATHY, NONISCHEMIC (HCC): Primary | ICD-10-CM

## 2021-09-28 DIAGNOSIS — I48.0 PAF (PAROXYSMAL ATRIAL FIBRILLATION) (HCC): ICD-10-CM

## 2021-09-28 DIAGNOSIS — R94.31 ABNORMAL EKG: ICD-10-CM

## 2021-09-28 DIAGNOSIS — I44.7 LBBB (LEFT BUNDLE BRANCH BLOCK): ICD-10-CM

## 2021-09-28 DIAGNOSIS — R60.0 BILATERAL LEG EDEMA: ICD-10-CM

## 2021-09-28 DIAGNOSIS — Z98.890 S/P CARDIAC CATH: ICD-10-CM

## 2021-09-28 PROCEDURE — 93000 ELECTROCARDIOGRAM COMPLETE: CPT | Performed by: INTERNAL MEDICINE

## 2021-09-28 PROCEDURE — G8400 PT W/DXA NO RESULTS DOC: HCPCS | Performed by: INTERNAL MEDICINE

## 2021-09-28 PROCEDURE — 3017F COLORECTAL CA SCREEN DOC REV: CPT | Performed by: INTERNAL MEDICINE

## 2021-09-28 PROCEDURE — 4040F PNEUMOC VAC/ADMIN/RCVD: CPT | Performed by: INTERNAL MEDICINE

## 2021-09-28 PROCEDURE — 99214 OFFICE O/P EST MOD 30 MIN: CPT | Performed by: INTERNAL MEDICINE

## 2021-09-28 PROCEDURE — G8417 CALC BMI ABV UP PARAM F/U: HCPCS | Performed by: INTERNAL MEDICINE

## 2021-09-28 PROCEDURE — 1090F PRES/ABSN URINE INCON ASSESS: CPT | Performed by: INTERNAL MEDICINE

## 2021-09-28 PROCEDURE — 1123F ACP DISCUSS/DSCN MKR DOCD: CPT | Performed by: INTERNAL MEDICINE

## 2021-09-28 PROCEDURE — 1036F TOBACCO NON-USER: CPT | Performed by: INTERNAL MEDICINE

## 2021-09-28 PROCEDURE — G8427 DOCREV CUR MEDS BY ELIG CLIN: HCPCS | Performed by: INTERNAL MEDICINE

## 2021-09-28 RX ORDER — DILTIAZEM HYDROCHLORIDE 120 MG/1
CAPSULE, COATED, EXTENDED RELEASE ORAL
Qty: 90 CAPSULE | Refills: 3 | Status: SHIPPED | OUTPATIENT
Start: 2021-09-28

## 2021-09-28 NOTE — PROGRESS NOTES
Chief Complaint   Patient presents with   [place] Cardiomyopathy   Originally Seen and evaluated in the hospital for for PNA and small pericardial effusionm  And discharged   Came today for F/U    6 month follow up. EKG done today. Denies chest pain, dizziness or palpitations    Sob on exertion chronic    Leg edema chronic    Gained 4 lb in 6 months    Leg edema +1    Varicose vein    C/o sob with exertion, occasional palpitations, swelling in ankles    Last EKG 9-      Patient Seen, Chart, Consults notes, Labs, Radiology studies reviewed.         Patient Active Problem List   Diagnosis    Breast cancer metastasized to axillary lymph node ([address]    Encounter for antineoplastic chemotherapy    Anemia    Leukopenia    Encounter for care related to Port-a-Cath    Febrile illness, acute    Pneumonia    Pleural effusion    Paroxysmal A-fib (HCC)    Abnormal EKG    Elevated alkaline phosphatase level    Generalized weakness    Leukocytosis    Weakness    Use of anastrozole (Arimidex)    PAF (paroxysmal atrial fibrillation) (HCC)    Bilateral leg edema- dependant now +1 from trace    S/P cardiac cath- 7/18/17- LM-P, LAD MID AND DISTAL 30%, LCX SMALL PATENT , RCA LARGED PATENT, EDP 5 MMHG, EF 60%- MD RX    Breast cancer metastasized to axillary lymph node, left (HCC)    LBBB (left bundle branch block)    SOB (shortness of breath) on exertion    Cardiomyopathy, probable nonischemic ([address]       Past Surgical History:   Procedure Laterality Date    BREAST LUMPECTOMY Left     2014    BUNIONECTOMY      CARPAL TUNNEL RELEASE Right 11/28/2016    COLONOSCOPY  12/02/2016    [place] BX OF BREAST, NEEDLE CORE, IMAGE GUIDE Left     2013 [facility]    [place] BX OF BREAST, NEEDLE CORE, IMAGE GUIDE Left     2013 benign    [place] BX OF BREAST, NEEDLE CORE, IMAGE GUIDE Left     2012 benign    ROTATOR CUFF REPAIR Right 08/03/2017        TONSILLECTOMY AND ADENOIDECTOMY Bilateral        No Known (ARIMIDEX) 1 MG tablet take 1 tablet by mouth once daily REQUEST FOR 90 DAY RX 90 tablet 3    diltiazem (CARDIZEM SR) 120 MG SR capsule Take 1 capsule by mouth daily 60 capsule 3    flecainide (TAMBOCOR) 50 MG tablet TAKE 1 TABLET TWICE A  tablet 3    Multiple Vitamins-Minerals (MULTIVITAMIN PO) Take  by mouth daily.  aspirin 325 MG EC tablet Take 1 tablet by mouth daily. 30 tablet 3    acetaminophen 650 MG TABS Take 650 mg by mouth every 4 hours as needed. 120 tablet 3     No current facility-administered medications for this visit. Review of Systems -     General ROS: negative  Psychological ROS: negative  Hematological and Lymphatic ROS: No history of blood clots or bleeding disorder. Respiratory ROS: no cough, shortness of breath, or wheezing  Cardiovascular ROS: no chest pain or dyspnea on exertion  Gastrointestinal ROS: negative  Genito-Urinary ROS: no dysuria, trouble voiding, or hematuria  Musculoskeletal ROS: negative  Neurological ROS: no TIA or stroke symptoms  Dermatological ROS: negative      Blood pressure (!) 115/50, pulse 55, height 5' 4\" (1.626 m), weight 155 lb 6.4 oz (70.5 kg), not currently breastfeeding.         Physical Examination:    General appearance - alert, well appearing, and in no distress  Mental status - alert, oriented to person, place, and time  Neck - supple, no significant adenopathy, no JVD, or carotid bruits  Chest - clear to auscultation, no wheezes, rales or rhonchi, symmetric air entry  Heart - normal rate, regular rhythm, normal S1, S2, no murmurs, rubs, clicks or gallops  Abdomen - soft, nontender, nondistended, no masses or organomegaly  Neurological - alert, oriented, normal speech, no focal findings or movement disorder noted  Musculoskeletal - no joint tenderness, deformity or swelling  Extremities - peripheral pulses normal, no pedal edema, no clubbing or cyanosis  Skin - normal coloration and turgor, no rashes, no suspicious skin lesions noted    Lab  No results for input(s): CKTOTAL, CKMB, CKMBINDEX, TROPONINI in the last 72 hours. CBC:   Lab Results   Component Value Date    WBC 7.5 06/17/2021    RBC 4.46 06/17/2021    HGB 13.4 06/17/2021    HCT 41.5 06/17/2021    MCV 93 06/17/2021    MCH 30.0 06/17/2021    MCHC 32.3 06/17/2021    RDW 12.5 06/17/2021     06/17/2021    MPV 9.2 06/17/2021     BMP:    Lab Results   Component Value Date     06/17/2021     10/01/2020    K 4.0 06/17/2021    K 3.9 10/01/2020    CL 98 10/01/2020    CO2 29 10/01/2020    BUN 15 10/01/2020    LABALBU 3.9 06/17/2021    CREATININE 0.8 06/17/2021    CREATININE 0.8 10/01/2020    CALCIUM 9.7 10/01/2020    LABGLOM 71 10/01/2020    GLUCOSE 99 10/01/2020     Hepatic Function Panel:    Lab Results   Component Value Date    ALKPHOS 105 06/17/2021    ALT 18 06/17/2021    AST 20 06/17/2021    PROT 6.7 06/17/2021    BILITOT <0.2 06/17/2021    BILIDIR <0.2 06/17/2021    LABALBU 3.9 06/17/2021     Magnesium:    Lab Results   Component Value Date    MG 1.8 10/01/2020     Warfarin PT/INR:    No components found for: PTPATWAR,  PTINRWAR  HgBA1c:    No results found for: LABA1C  FLP:    Lab Results   Component Value Date    TRIG 67 04/02/2018     04/02/2018    LDLCALC 100 04/02/2018     TSH:    Lab Results   Component Value Date    TSH 1.080 11/12/2014       EKG 12/4/14-Sinus  Rhythm   -  Nonspecific T-abnormality. ABNORMAL  msec    SUMMARY:    Left ventricle:  Size was normal.  Systolic function was normal. Ejection fraction was estimated in the range   of 55 % to 65 %. There were no regional wall motion abnormalities. Left atrium:  The atrium was mildly dilated. Pericardium:  A small, loculated pericardial effusion was identified circumferential to   the heart. The fluid had no internal echoes. There was no evidence of hemodynamic compromise. COMPARISONS:  Comparison was made with the previous study of 12-Nov-2014.  Pericardial   effusion appearance has not changed. Prepared and signed by    Fawn Bashir MD    EKG 3/3./15-Sinus  Rhythm    -  Nonspecific T-abnormality. No acute changes  ABNORMAL   msec    EKG 7/7/15-  Sinus  Rhythm   -  Negative precordial T-waves. WITHIN NORMAL LIMITS    EKG 12/7/15  Sinus  Rhythm   -  Nonspecific T-abnormality. QTC  msec  ABNORMAL     EKG 6/12/17  NSR, prwp, no acute changed  QTC      Conclusions      Summary   Lexiscan EKG stress test is not suggestive for ischemia.   Calculated gated LVEF 69 %.   The T.I.D. ratio was 1.3 .   There was a small sized, mildly severe, reversible myocardial perfusion   defect of the apex and distal anterior wall.   The nuclear images is suggestive for mild myocardial ischemia in the apex   and distal segment of anterior wall.      Recommendation   Clinical correlation is recommended.      Signatures      ----------------------------------------------------------------   Electronically signed by Janak Koenig MD (Interpreting   Cardiologist) on 06/26/2017 at 19:33   ----------------------------------------------------------------    ekg  9/10/18  Sinus  Rhythm   WITHIN NORMAL LIMITS   msec        Event monitor  CONCLUSION:  This is a benign event monitor finding with normal sinus  rhythm. No evidence of atrial fibrillation. Heart rate ranging from 72 to  100 beats per minute. No other form of arrhythmia noted.     For sure, there is no evidence of atrial fibrillation.      CHUY Murillo M.D.     D: 04/15/2018 13:04:28    EKG 3/11/19  NSR, LBBB  The LBBB is new compared to ekg sept 2018  No hx of cp     MSEC    EKG 4/17/19  Sinus  Rhythm   -  Nonspecific T-abnormality.    ABNORMAL   No more LBBB      Conclusions      Summary   Left ventricle size is normal.   Normal left ventricular wall thickness.   There was moderate global hypokinesis of the left ventricle.   Systolic function was moderately reduced.   Ejection fraction is visually estimated at 45%.   Doppler parameters were consistent with abnormal left ventricular   relaxation (grade 1 diastolic dysfunction).   Doppler parameters were consistent with abnormal left ventricular   relaxation (grade 1 diastolic dysfunction).   The left atrium is Mildly dilated.   Moderate mitral regurgitation is present.      Signature      ----------------------------------------------------------------   Electronically signed by Carrie Gomez MD (Interpreting   physician) on 03/27/2019 at 07:02 PM      Conclusions      Summary   No ischemic EKG changes.   The nuclear images is suggestive for mild myocardial ischemia in the apex   and distal anterior walls. Gated EF 60%      Recommendation   Clinical correlation is recommended due to poor image quality.      Signatures      ----------------------------------------------------------------   Electronically signed by Carrie Gomez MD (Interpreting   Cardiologist) on 05/07/2019 at 21:06   ----------------------------------------------------------------  EKG 9/13/19  NSR, no acute abn    ekg 9/24/2020  NSR, NO acute abn    ekg 9/28/21  +NSR, LBBBB  New LBBB since 2019  ahd LBBB 03/2019      Assessment     Diagnosis Orders   1. Cardiomyopathy, probable nonischemic ([address]     2. PAF (paroxysmal atrial fibrillation) ([address]     3. Bilateral leg edema- dependant now +1 from trace     4. LBBB (left bundle branch block)  EKG 12 Lead   5. Abnormal EKG     6.  S/P cardiac cath- 7/18/17- LM-P, LAD MID AND DISTAL 30%, LCX SMALL PATENT , RCA LARGED PATENT, EDP 5 MMHG, EF 60%- MD RX           Previous admission DX  New onset AFB: CVR now Less than 24 hours now IN NSR  CHADS2= 0  TSH, potassium and mag normal No Hx TEMI    Now moderate pericardial effusion by CT done this am  Small pericardial effusion by echo    EKG changes: new T wave inversions leads V4-V6  - trend top      Pleural effusion: s\p Lt thoracentesis 11-16 with 225 ml off    viral syndrome- with possible pericarditis and pleurisy    possible pericarditis and pleurisy    Hx breast CA- s\p lumpectomy and chemo/radiation    Atypical CP upon admit- resolved now      Plan     The  Most  Current current meds and labs reviewed      Still in nSR  ABN EKG LBBB 03/2019  Echo EF 45% and later 60%  NO CP   Sob on exertion      Cardiomyopathy: improving, no CHF symptoms, no change in clinical condition. Will need periodic echocardiograms depending on symptoms. LBBB new and now resolved and EF 45% new drop from 60% prior  latest EF 60%  No cp  Sob on exertion not new per pat  Off  felcanide 50 bid  Abnormal nuclear stress test- poor image quaility mild ischemia probvaly artifact related - no cp- med RX  Cont lopressor 25 po bid      Continue the current treatment and with constant vigilance to changes in symptoms and also any potential side effects. Return for care or seek medical attention immediately if symptoms got worse and/or develop new symptoms. Limited echo revealed stable small pericardial effusion- resolved  Completed months of colchicine      PAF 2015 and remain in NSR for over two yr- post chemo and radiation  chads of 0, chads vasc 2 ( age and female)  EKG NSR Normal QTC  Rate control for now and consider rhythm control down the line  Cont. Cardizem cd 120 qd   Remain in NSR for over two yr and will cont asa rather   OA considered and she does not want it now  Check event mionitor_ revealed NSR  Cont asa 325 po qd  Any recurrence of atr fib she need OA and to be readdressed  Pat verbalized understanding risk of CVA short of OA    Off  Flecanide 50 BID for drop in ef to 45    HOME BP ADVISED    Leg edema  +1   Cont  Aldactazide  1/2 po qd      D/w the pat the plan of care    Echo and ekg reviewed and doing well    Discussed use, benefit, and side effects of prescribed medications. All patient questions answered. Pt voiced understanding. Instructed to continue current medications, diet and exercise.  Continue risk factor modification and medical management. Patient agreed with treatment plan. Follow up as directed.       RTC in 6 months    [facility]egasB[facility] Center

## 2021-10-07 ENCOUNTER — HOSPITAL ENCOUNTER (OUTPATIENT)
Age: 71
Discharge: HOME | End: 2021-10-07
Payer: MEDICARE

## 2021-10-07 DIAGNOSIS — I42.8 CARDIOMYOPATHY, NONISCHEMIC (HCC): ICD-10-CM

## 2021-10-07 DIAGNOSIS — R60.0 BILATERAL LEG EDEMA: ICD-10-CM

## 2021-10-07 DIAGNOSIS — R94.31 ABNORMAL EKG: ICD-10-CM

## 2021-10-07 DIAGNOSIS — I48.0 PAF (PAROXYSMAL ATRIAL FIBRILLATION) (HCC): ICD-10-CM

## 2021-10-07 DIAGNOSIS — Z98.890 S/P CARDIAC CATH: ICD-10-CM

## 2021-10-07 DIAGNOSIS — I44.7 LBBB (LEFT BUNDLE BRANCH BLOCK): ICD-10-CM

## 2021-10-07 LAB
ANION GAP SERPL CALCULATED.3IONS-SCNC: 11 MEQ/L (ref 8–16)
BUN BLDV-MCNC: 16 MG/DL (ref 7–22)
CALCIUM SERPL-MCNC: 9.5 MG/DL (ref 8.5–10.5)
CHLORIDE BLD-SCNC: 102 MEQ/L (ref 98–111)
CO2: 28 MEQ/L (ref 23–33)
CREAT SERPL-MCNC: 0.7 MG/DL (ref 0.4–1.2)
GFR SERPL CREATININE-BSD FRML MDRD: 83 ML/MIN/1.73M2
GLUCOSE BLD-MCNC: 135 MG/DL (ref 70–108)
MAGNESIUM: 1.8 MG/DL (ref 1.6–2.4)
POTASSIUM SERPL-SCNC: 4 MEQ/L (ref 3.5–5.2)
SODIUM BLD-SCNC: 141 MEQ/L (ref 135–145)

## 2021-10-07 PROCEDURE — 83735 ASSAY OF MAGNESIUM: CPT

## 2021-10-07 PROCEDURE — 80048 BASIC METABOLIC PNL TOTAL CA: CPT

## 2021-10-07 PROCEDURE — 36415 COLL VENOUS BLD VENIPUNCTURE: CPT

## 2022-03-14 ENCOUNTER — OFFICE VISIT (OUTPATIENT)
Dept: FAMILY MEDICINE CLINIC | Age: 72
End: 2022-03-14
Payer: MEDICARE

## 2022-03-14 VITALS
BODY MASS INDEX: 26.61 KG/M2 | SYSTOLIC BLOOD PRESSURE: 112 MMHG | OXYGEN SATURATION: 98 % | TEMPERATURE: 96.8 F | RESPIRATION RATE: 16 BRPM | HEART RATE: 64 BPM | DIASTOLIC BLOOD PRESSURE: 70 MMHG | WEIGHT: 155 LBS

## 2022-03-14 DIAGNOSIS — I48.0 PAF (PAROXYSMAL ATRIAL FIBRILLATION) (HCC): ICD-10-CM

## 2022-03-14 DIAGNOSIS — C77.3 CARCINOMA OF LEFT BREAST METASTATIC TO AXILLARY LYMPH NODE (HCC): ICD-10-CM

## 2022-03-14 DIAGNOSIS — M70.22 OLECRANON BURSITIS OF LEFT ELBOW: Primary | ICD-10-CM

## 2022-03-14 DIAGNOSIS — C50.912 CARCINOMA OF LEFT BREAST METASTATIC TO AXILLARY LYMPH NODE (HCC): ICD-10-CM

## 2022-03-14 DIAGNOSIS — I42.8 CARDIOMYOPATHY, NONISCHEMIC (HCC): ICD-10-CM

## 2022-03-14 PROCEDURE — 99214 OFFICE O/P EST MOD 30 MIN: CPT | Performed by: NURSE PRACTITIONER

## 2022-03-14 PROCEDURE — 1090F PRES/ABSN URINE INCON ASSESS: CPT | Performed by: NURSE PRACTITIONER

## 2022-03-14 PROCEDURE — G8400 PT W/DXA NO RESULTS DOC: HCPCS | Performed by: NURSE PRACTITIONER

## 2022-03-14 PROCEDURE — 1123F ACP DISCUSS/DSCN MKR DOCD: CPT | Performed by: NURSE PRACTITIONER

## 2022-03-14 PROCEDURE — G8417 CALC BMI ABV UP PARAM F/U: HCPCS | Performed by: NURSE PRACTITIONER

## 2022-03-14 PROCEDURE — 4040F PNEUMOC VAC/ADMIN/RCVD: CPT | Performed by: NURSE PRACTITIONER

## 2022-03-14 PROCEDURE — G8484 FLU IMMUNIZE NO ADMIN: HCPCS | Performed by: NURSE PRACTITIONER

## 2022-03-14 PROCEDURE — 1036F TOBACCO NON-USER: CPT | Performed by: NURSE PRACTITIONER

## 2022-03-14 PROCEDURE — 3017F COLORECTAL CA SCREEN DOC REV: CPT | Performed by: NURSE PRACTITIONER

## 2022-03-14 PROCEDURE — G8427 DOCREV CUR MEDS BY ELIG CLIN: HCPCS | Performed by: NURSE PRACTITIONER

## 2022-03-14 SDOH — ECONOMIC STABILITY: FOOD INSECURITY: WITHIN THE PAST 12 MONTHS, YOU WORRIED THAT YOUR FOOD WOULD RUN OUT BEFORE YOU GOT MONEY TO BUY MORE.: NEVER TRUE

## 2022-03-14 SDOH — ECONOMIC STABILITY: FOOD INSECURITY: WITHIN THE PAST 12 MONTHS, THE FOOD YOU BOUGHT JUST DIDN'T LAST AND YOU DIDN'T HAVE MONEY TO GET MORE.: NEVER TRUE

## 2022-03-14 ASSESSMENT — PATIENT HEALTH QUESTIONNAIRE - PHQ9
2. FEELING DOWN, DEPRESSED OR HOPELESS: 0
SUM OF ALL RESPONSES TO PHQ9 QUESTIONS 1 & 2: 0
SUM OF ALL RESPONSES TO PHQ QUESTIONS 1-9: 0
1. LITTLE INTEREST OR PLEASURE IN DOING THINGS: 0
SUM OF ALL RESPONSES TO PHQ QUESTIONS 1-9: 0

## 2022-03-14 ASSESSMENT — ENCOUNTER SYMPTOMS
EYES NEGATIVE: 1
RESPIRATORY NEGATIVE: 1
GASTROINTESTINAL NEGATIVE: 1

## 2022-03-14 ASSESSMENT — SOCIAL DETERMINANTS OF HEALTH (SDOH): HOW HARD IS IT FOR YOU TO PAY FOR THE VERY BASICS LIKE FOOD, HOUSING, MEDICAL CARE, AND HEATING?: NOT HARD AT ALL

## 2022-03-14 NOTE — PROGRESS NOTES
Carolina Carpio is a 80 y.o. female whopresents today for :  Chief Complaint   Patient presents with    Elbow Swelling     left  no injury       HPI:     HPI  Pt here with left elbow swelling. Has been swollen for months. Tender when puts pressure on it.   Original injury was a fall      Patient Active Problem List   Diagnosis    [facility]as[facility]illary [facility] node [facility]    Encounter for antineoplastic chemotherapy    Anemia    Leukopenia    Encounter for care related to Port-a-Cath    Febrile illness, acute    Pneumonia    Pleural effusion    Paroxysmal A-fib (HCC)    Abnormal EKG    Elevated alkaline phosphatase level    Generalized weakness    Leukocytosis    Weakness    Use of anastrozole (Arimidex)    PAF (paroxysmal atrial fibrillation) (HCC)    Bilateral leg edema- dependant now +1 from trace    S/P cardiac cath- 7/18/17- LM-P, LAD MID AND DISTAL 30%, LCX SMALL PATENT , RCA LARGED PATENT, EDP 5 MMHG, EF 60%- MD RX    Breast cancer metastasized to axillary lymph node, left (HCC)    LBBB (left bundle branch block)    SOB (shortness of breath) on exertion    Cardiomyopathy, probable nonischemic ([address]        Past Medical History:   Diagnosis Date    Breast cancer ([address]     2013 Left Invasive Ductal    Cancer ([address] 2013    Breast LEFT    History of therapeutic radiation     completed 2014    Hx antineoplastic chemo     pre op chemo 2014      Past Surgical History:   Procedure Laterality Date    BREAST LUMPECTOMY Left     2014    BUNIONECTOMY      CARPAL TUNNEL RELEASE Right 11/28/2016    COLONOSCOPY  12/02/2016    [place] BX OF BREAST, NEEDLE CORE, IMAGE GUIDE Left     2013 [facility]    [place] BX OF BREAST, NEEDLE CORE, IMAGE GUIDE Left     2013 benign    [place] BX OF BREAST, NEEDLE CORE, IMAGE GUIDE Left     2012 benign    ROTATOR CUFF REPAIR Right 08/03/2017        TONSILLECTOMY AND ADENOIDECTOMY Bilateral      Family History   Problem Relation Age of Onset    Mult Meningococcal (ACWY) vaccine  Aged Out       Subjective:     Review of Systems   Constitutional: Negative. HENT: Negative. Eyes: Negative. Respiratory: Negative. Cardiovascular: Negative. Gastrointestinal: Negative. Musculoskeletal: Positive for joint swelling. Skin: Negative. Neurological: Negative. Objective:     Vitals:    03/14/22 1314   BP: 112/70   Site: Right Upper Arm   Position: Sitting   Cuff Size: Large Adult   Pulse: 64   Resp: 16   Temp: 96.8 °F (36 °C)   TempSrc: Temporal   SpO2: 98%   Weight: 155 lb (70.3 kg)       Physical Exam  Constitutional:       Appearance: She is well-developed. HENT:      Head: Normocephalic. Right Ear: Tympanic membrane and external ear normal.      Left Ear: Tympanic membrane and external ear normal.      Nose: Nose normal.   Cardiovascular:      Rate and Rhythm: Normal rate and regular rhythm. Heart sounds: Normal heart sounds. No murmur heard. No friction rub. No gallop. Pulmonary:      Effort: Pulmonary effort is normal.      Breath sounds: Normal breath sounds. No wheezing or rales. Abdominal:      General: Bowel sounds are normal.      Palpations: Abdomen is soft. Tenderness: There is no abdominal tenderness. There is no guarding. Musculoskeletal:         General: Normal range of motion. Arms:       Cervical back: Normal range of motion and neck supple. Lymphadenopathy:      Cervical: No cervical adenopathy. Skin:     General: Skin is warm. Neurological:      Mental Status: She is alert and oriented to person, place, and time. Deep Tendon Reflexes: Reflexes are normal and symmetric. Assessment:      Diagnosis Orders   1. Olecranon bursitis of left elbow     2. Carcinoma of left breast metastatic to axillary lymph node ([address]     3. Cardiomyopathy, nonischemic ([address]     4. PAF (paroxysmal atrial fibrillation) ([address]         Plan:      No follow-ups on file.      No orders of the defined types were placed in this encounter. No orders of the defined types were placed in this encounter. as above. If not better let us know  Reviewed chronic health update. Advised to schedule medicare awv       Patient given educational materials - seepatient instructions. Discussed use, benefit, and side effects of prescribed medications. All patient questions answered. Pt voiced understanding. Patient agreed withtreatment plan. Follow up as directed.      Electronically signed by MARGARET Veronica CNP on 3/14/2022 at 5:18 PM

## 2022-03-28 ENCOUNTER — OFFICE VISIT (OUTPATIENT)
Dept: CARDIOLOGY CLINIC | Age: 72
End: 2022-03-28
Payer: MEDICARE

## 2022-03-28 VITALS
DIASTOLIC BLOOD PRESSURE: 57 MMHG | WEIGHT: 158.8 LBS | SYSTOLIC BLOOD PRESSURE: 113 MMHG | BODY MASS INDEX: 27.11 KG/M2 | HEART RATE: 63 BPM | HEIGHT: 64 IN

## 2022-03-28 DIAGNOSIS — I48.0 PAF (PAROXYSMAL ATRIAL FIBRILLATION) (HCC): ICD-10-CM

## 2022-03-28 DIAGNOSIS — I44.7 LBBB (LEFT BUNDLE BRANCH BLOCK): ICD-10-CM

## 2022-03-28 DIAGNOSIS — R94.31 ABNORMAL EKG: ICD-10-CM

## 2022-03-28 DIAGNOSIS — Z98.890 S/P CARDIAC CATH: ICD-10-CM

## 2022-03-28 DIAGNOSIS — R06.02 SOB (SHORTNESS OF BREATH) ON EXERTION: ICD-10-CM

## 2022-03-28 DIAGNOSIS — R60.0 BILATERAL LEG EDEMA: ICD-10-CM

## 2022-03-28 DIAGNOSIS — I42.8 CARDIOMYOPATHY, NONISCHEMIC (HCC): Primary | ICD-10-CM

## 2022-03-28 PROCEDURE — 1123F ACP DISCUSS/DSCN MKR DOCD: CPT | Performed by: INTERNAL MEDICINE

## 2022-03-28 PROCEDURE — G8417 CALC BMI ABV UP PARAM F/U: HCPCS | Performed by: INTERNAL MEDICINE

## 2022-03-28 PROCEDURE — G8427 DOCREV CUR MEDS BY ELIG CLIN: HCPCS | Performed by: INTERNAL MEDICINE

## 2022-03-28 PROCEDURE — 3017F COLORECTAL CA SCREEN DOC REV: CPT | Performed by: INTERNAL MEDICINE

## 2022-03-28 PROCEDURE — G8484 FLU IMMUNIZE NO ADMIN: HCPCS | Performed by: INTERNAL MEDICINE

## 2022-03-28 PROCEDURE — 1036F TOBACCO NON-USER: CPT | Performed by: INTERNAL MEDICINE

## 2022-03-28 PROCEDURE — 1090F PRES/ABSN URINE INCON ASSESS: CPT | Performed by: INTERNAL MEDICINE

## 2022-03-28 PROCEDURE — G8400 PT W/DXA NO RESULTS DOC: HCPCS | Performed by: INTERNAL MEDICINE

## 2022-03-28 PROCEDURE — 4040F PNEUMOC VAC/ADMIN/RCVD: CPT | Performed by: INTERNAL MEDICINE

## 2022-03-28 PROCEDURE — 99214 OFFICE O/P EST MOD 30 MIN: CPT | Performed by: INTERNAL MEDICINE

## 2022-03-28 RX ORDER — SPIRONOLACTONE AND HYDROCHLOROTHIAZIDE 25; 25 MG/1; MG/1
0.5 TABLET ORAL DAILY
Qty: 45 TABLET | Refills: 3 | Status: SHIPPED | OUTPATIENT
Start: 2022-03-28

## 2022-03-28 NOTE — PROGRESS NOTES
Chief Complaint   Patient presents with    6 Month Follow-Up    Cardiomyopathy    Check-Up   Originally Seen and evaluated in the hospital for for PNA and small pericardial effusionm  And discharged   Came today for F/U      Pt here for a 6 month f/u    EKG done 9-28-21    Denies chest pain, dizziness or palpitations    Sob on exertion chronic    Leg edema chronic    No wt gain    Leg edema +1    Varicose vein    Patient Seen, Chart, Consults notes, Labs, Radiology studies reviewed.         Patient Active Problem List   Diagnosis    Breast cancer metastasized to axillary lymph node ([address]    Encounter for antineoplastic chemotherapy    Anemia    Leukopenia    Encounter for care related to Port-a-Cath    Febrile illness, acute    Pneumonia    Pleural effusion    Paroxysmal A-fib (HCC)    Abnormal EKG    Elevated alkaline phosphatase level    Generalized weakness    Leukocytosis    Weakness    Use of anastrozole (Arimidex)    PAF (paroxysmal atrial fibrillation) (HCC)    Bilateral leg edema- dependant now +1 from trace    S/P cardiac cath- 7/18/17- LM-P, LAD MID AND DISTAL 30%, LCX SMALL PATENT , RCA LARGED PATENT, EDP 5 MMHG, EF 60%- MD RX    Breast cancer metastasized to axillary lymph node, left (HCC)    LBBB (left bundle branch block)    SOB (shortness of breath) on exertion    Cardiomyopathy, probable nonischemic ([address]       Past Surgical History:   Procedure Laterality Date    BREAST LUMPECTOMY Left     2014    BUNIONECTOMY      CARPAL TUNNEL RELEASE Right 11/28/2016    COLONOSCOPY  12/02/2016    DE BX OF BREAST, NEEDLE CORE, IMAGE GUIDE Left     2013 [facility]    DE BX OF BREAST, NEEDLE CORE, IMAGE GUIDE Left     2013 benign    DE BX OF BREAST, NEEDLE CORE, IMAGE GUIDE Left     2012 benign    ROTATOR CUFF REPAIR Right 08/03/2017        TONSILLECTOMY AND ADENOIDECTOMY Bilateral        No Known Allergies     Family History   Problem Relation Age of Onset    Mult Sclerosis Mother  Breast Cancer Mother 36    Alcohol Abuse Father     Cancer Father         Social History     Socioeconomic History    Marital status:      Spouse name: Not on file    Number of children: 3    Years of education: Not on file    Highest education level: Not on file   Occupational History    Not on file   Tobacco Use    Smoking status: Former Smoker     Packs/day: 0.25     Years: 25.00     Pack years: 6.25     Quit date: 2005     Years since quittin.2    Smokeless tobacco: Never Used   Vaping Use    Vaping Use: Never used   Substance and Sexual Activity    Alcohol use: Yes     Alcohol/week: 0.0 standard drinks     Comment: social    Drug use: No    Sexual activity: Not on file   Other Topics Concern    Not on file   Social History Narrative    Not on file     Social Determinants of Health     Financial Resource Strain: Low Risk     Difficulty of Paying Living Expenses: Not hard at all   Food Insecurity: No Food Insecurity    Worried About Running Out of Food in the Last Year: Never true    [address] in the Last Year: Never true   Transportation Needs:     Lack of Transportation (Medical): Not on file    Lack of Transportation (Non-Medical):  Not on file   Physical Activity:     Days of Exercise per Week: Not on file    Minutes of Exercise per Session: Not on file   Stress:     Feeling of Stress : Not on file   Social Connections:     Frequency of Communication with Friends and Family: Not on file    Frequency of Social Gatherings with Friends and Family: Not on file    Attends [employer] Services: Not on file    Active Member of Clubs or Organizations: Not on file    Attends Club or Organization Meetings: Not on file    Marital Status: Not on file   Intimate Partner Violence:     Fear of Current or Ex-Partner: Not on file    Emotionally Abused: Not on file    Physically Abused: Not on file    Sexually Abused: Not on file   Housing Stability:     Unable to Pay for Housing in the Last Year: Not on file    Number of Places Lived in the Last Year: Not on file    Unstable Housing in the Last Year: Not on file       Current Outpatient Prescriptions   Medication Sig Dispense Refill    calcium carbonate (OSCAL) 500 MG TABS tablet Take 500 mg by mouth 2 times daily      anastrozole (ARIMIDEX) 1 MG tablet take 1 tablet by mouth once daily REQUEST FOR 90 DAY RX 90 tablet 3    diltiazem (CARDIZEM SR) 120 MG SR capsule Take 1 capsule by mouth daily 60 capsule 3    flecainide (TAMBOCOR) 50 MG tablet TAKE 1 TABLET TWICE A  tablet 3    Multiple Vitamins-Minerals (MULTIVITAMIN PO) Take  by mouth daily.  aspirin 325 MG EC tablet Take 1 tablet by mouth daily. 30 tablet 3    acetaminophen 650 MG TABS Take 650 mg by mouth every 4 hours as needed. 120 tablet 3     No current facility-administered medications for this visit. Review of Systems -     General ROS: negative  Psychological ROS: negative  Hematological and Lymphatic ROS: No history of blood clots or bleeding disorder. Respiratory ROS: no cough, shortness of breath, or wheezing  Cardiovascular ROS: no chest pain or dyspnea on exertion  Gastrointestinal ROS: negative  Genito-Urinary ROS: no dysuria, trouble voiding, or hematuria  Musculoskeletal ROS: negative  Neurological ROS: no TIA or stroke symptoms  Dermatological ROS: negative      Blood pressure (!) 113/57, pulse 63, height 5' 4\" (1.626 m), weight 158 lb 12.8 oz (72 kg), not currently breastfeeding.         Physical Examination:    General appearance - alert, well appearing, and in no distress  Mental status - alert, oriented to person, place, and time  Neck - supple, no significant adenopathy, no JVD, or carotid bruits  Chest - clear to auscultation, no wheezes, rales or rhonchi, symmetric air entry  Heart - normal rate, regular rhythm, normal S1, S2, no murmurs, rubs, clicks or gallops  Abdomen - soft, nontender, nondistended, no masses or organomegaly  Neurological - alert, oriented, normal speech, no focal findings or movement disorder noted  Musculoskeletal - no joint tenderness, deformity or swelling  Extremities - peripheral pulses normal, no pedal edema, no clubbing or cyanosis  Skin - normal coloration and turgor, no rashes, no suspicious skin lesions noted    Lab  No results for input(s): CKTOTAL, CKMB, CKMBINDEX, TROPONINI in the last 72 hours. CBC:   Lab Results   Component Value Date    WBC 7.5 06/17/2021    RBC 4.46 06/17/2021    HGB 13.4 06/17/2021    HCT 41.5 06/17/2021    MCV 93 06/17/2021    MCH 30.0 06/17/2021    MCHC 32.3 06/17/2021    RDW 12.5 06/17/2021     06/17/2021    MPV 9.2 06/17/2021     BMP:    Lab Results   Component Value Date     10/07/2021    K 4.0 10/07/2021     10/07/2021    CO2 28 10/07/2021    BUN 16 10/07/2021    LABALBU 3.9 06/17/2021    CREATININE 0.7 10/07/2021    CALCIUM 9.5 10/07/2021    LABGLOM 83 10/07/2021    GLUCOSE 135 10/07/2021     Hepatic Function Panel:    Lab Results   Component Value Date    ALKPHOS 105 06/17/2021    ALT 18 06/17/2021    AST 20 06/17/2021    PROT 6.7 06/17/2021    BILITOT <0.2 06/17/2021    BILIDIR <0.2 06/17/2021    LABALBU 3.9 06/17/2021     Magnesium:    Lab Results   Component Value Date    MG 1.8 10/07/2021     Warfarin PT/INR:    No components found for: PTPATWAR,  PTINRWAR  HgBA1c:    No results found for: LABA1C  FLP:    Lab Results   Component Value Date    TRIG 67 04/02/2018     04/02/2018    LDLCALC 100 04/02/2018     TSH:    Lab Results   Component Value Date    TSH 1.080 11/12/2014       EKG 12/4/14-Sinus  Rhythm   -  Nonspecific T-abnormality. ABNORMAL  msec    SUMMARY:    Left ventricle:  Size was normal.  Systolic function was normal. Ejection fraction was estimated in the range   of 55 % to 65 %. There were no regional wall motion abnormalities. Left atrium:  The atrium was mildly dilated.     Pericardium:  A small, loculated pericardial effusion was identified circumferential to   the heart. The fluid had no internal echoes. There was no evidence of hemodynamic compromise. COMPARISONS:  Comparison was made with the previous study of 12-Nov-2014. Pericardial   effusion appearance has not changed. Prepared and signed by    Desiree Mcdaniel MD    EKG 3/3./15-Sinus  Rhythm    -  Nonspecific T-abnormality. No acute changes  ABNORMAL   msec    EKG 7/7/15-  Sinus  Rhythm   -  Negative precordial T-waves. WITHIN NORMAL LIMITS    EKG 12/7/15  Sinus  Rhythm   -  Nonspecific T-abnormality. QTC  msec  ABNORMAL     EKG 6/12/17  NSR, prwp, no acute changed  QTC      Conclusions      Summary   Lexiscan EKG stress test is not suggestive for ischemia.   Calculated gated LVEF 69 %.   The T.I.D. ratio was 1.3 .   There was a small sized, mildly severe, reversible myocardial perfusion   defect of the apex and distal anterior wall.   The nuclear images is suggestive for mild myocardial ischemia in the apex   and distal segment of anterior wall.      Recommendation   Clinical correlation is recommended.      Signatures      ----------------------------------------------------------------   Electronically signed by Wesley Dahl MD (Interpreting   Cardiologist) on 06/26/2017 at 19:33   ----------------------------------------------------------------    ekg  9/10/18  Sinus  Rhythm   WITHIN NORMAL LIMITS   msec        Event monitor  CONCLUSION:  This is a benign event monitor finding with normal sinus  rhythm. No evidence of atrial fibrillation. Heart rate ranging from 72 to  100 beats per minute. No other form of arrhythmia noted.     For sure, there is no evidence of atrial fibrillation.      CHUY Hernandez M.D.     D: 04/15/2018 13:04:28    EKG 3/11/19  NSR, LBBB  The LBBB is new compared to ekg sept 2018  No hx of cp     MSEC    EKG 4/17/19  Sinus  Rhythm   -  Nonspecific T-abnormality.    ABNORMAL   No more LBBB      Conclusions      Summary   Left ventricle size is normal.   Normal left ventricular wall thickness.   There was moderate global hypokinesis of the left ventricle.   Systolic function was moderately reduced.   Ejection fraction is visually estimated at 45%.   Doppler parameters were consistent with abnormal left ventricular   relaxation (grade 1 diastolic dysfunction).   Doppler parameters were consistent with abnormal left ventricular   relaxation (grade 1 diastolic dysfunction).   The left atrium is Mildly dilated.   Moderate mitral regurgitation is present.      Signature      ----------------------------------------------------------------   Electronically signed by Wicho Mora MD (Interpreting   physician) on 03/27/2019 at 07:02 PM      Conclusions      Summary   No ischemic EKG changes.   The nuclear images is suggestive for mild myocardial ischemia in the apex   and distal anterior walls. Gated EF 60%      Recommendation   Clinical correlation is recommended due to poor image quality.      Signatures      ----------------------------------------------------------------   Electronically signed by Wicho Mora MD (Interpreting   Cardiologist) on 05/07/2019 at 21:06   ----------------------------------------------------------------  EKG 9/13/19  NSR, no acute abn    ekg 9/24/2020  NSR, NO acute abn    ekg 9/28/21  +NSR, LBBBB  New LBBB since 2019  ahd LBBB 03/2019      Assessment     Diagnosis Orders   1. Cardiomyopathy, probable nonischemic ([address]     2. PAF (paroxysmal atrial fibrillation) ([address]     3. LBBB (left bundle branch block)     4. SOB (shortness of breath) on exertion     5. Abnormal EKG     6. Bilateral leg edema- dependant now +1 from trace     7.  S/P cardiac cath- 7/18/17- LM-P, LAD MID AND DISTAL 30%, LCX SMALL PATENT , RCA LARGED PATENT, EDP 5 MMHG, EF 60%- MD RX           Previous admission DX  New onset AFB: CVR now Less than 24 hours now IN NSR  CHADS2= 0  TSH, potassium and pat the plan of care    Echo and ekg reviewed and doing well    Discussed use, benefit, and side effects of prescribed medications. All patient questions answered. Pt voiced understanding. Instructed to continue current medications, diet and exercise. Continue risk factor modification and medical management. Patient agreed with treatment plan. Follow up as directed.       RTC in 6 months    [facility]

## 2022-03-29 ENCOUNTER — OFFICE VISIT (OUTPATIENT)
Dept: FAMILY MEDICINE CLINIC | Age: 72
End: 2022-03-29
Payer: MEDICARE

## 2022-03-29 VITALS
HEART RATE: 68 BPM | HEIGHT: 64 IN | BODY MASS INDEX: 26.8 KG/M2 | OXYGEN SATURATION: 96 % | TEMPERATURE: 96.4 F | RESPIRATION RATE: 16 BRPM | SYSTOLIC BLOOD PRESSURE: 122 MMHG | WEIGHT: 157 LBS | DIASTOLIC BLOOD PRESSURE: 70 MMHG

## 2022-03-29 DIAGNOSIS — I42.8 CARDIOMYOPATHY, NONISCHEMIC (HCC): Primary | ICD-10-CM

## 2022-03-29 DIAGNOSIS — E78.5 HYPERLIPIDEMIA, UNSPECIFIED HYPERLIPIDEMIA TYPE: ICD-10-CM

## 2022-03-29 DIAGNOSIS — M70.22 OLECRANON BURSITIS OF LEFT ELBOW: ICD-10-CM

## 2022-03-29 PROCEDURE — 99214 OFFICE O/P EST MOD 30 MIN: CPT | Performed by: NURSE PRACTITIONER

## 2022-03-29 PROCEDURE — 1036F TOBACCO NON-USER: CPT | Performed by: NURSE PRACTITIONER

## 2022-03-29 PROCEDURE — G8484 FLU IMMUNIZE NO ADMIN: HCPCS | Performed by: NURSE PRACTITIONER

## 2022-03-29 PROCEDURE — G8400 PT W/DXA NO RESULTS DOC: HCPCS | Performed by: NURSE PRACTITIONER

## 2022-03-29 PROCEDURE — 4040F PNEUMOC VAC/ADMIN/RCVD: CPT | Performed by: NURSE PRACTITIONER

## 2022-03-29 PROCEDURE — G8417 CALC BMI ABV UP PARAM F/U: HCPCS | Performed by: NURSE PRACTITIONER

## 2022-03-29 PROCEDURE — 3017F COLORECTAL CA SCREEN DOC REV: CPT | Performed by: NURSE PRACTITIONER

## 2022-03-29 PROCEDURE — 1123F ACP DISCUSS/DSCN MKR DOCD: CPT | Performed by: NURSE PRACTITIONER

## 2022-03-29 PROCEDURE — 1090F PRES/ABSN URINE INCON ASSESS: CPT | Performed by: NURSE PRACTITIONER

## 2022-03-29 PROCEDURE — G8427 DOCREV CUR MEDS BY ELIG CLIN: HCPCS | Performed by: NURSE PRACTITIONER

## 2022-03-29 RX ORDER — METHYLPREDNISOLONE 4 MG/1
TABLET ORAL
Qty: 1 KIT | Refills: 0 | Status: SHIPPED | OUTPATIENT
Start: 2022-03-29 | End: 2022-04-04

## 2022-03-29 RX ORDER — CEPHALEXIN 500 MG/1
500 CAPSULE ORAL 3 TIMES DAILY
Qty: 21 CAPSULE | Refills: 0 | Status: SHIPPED | OUTPATIENT
Start: 2022-03-29 | End: 2022-04-26 | Stop reason: SDUPTHER

## 2022-03-29 ASSESSMENT — ENCOUNTER SYMPTOMS
EYES NEGATIVE: 1
GASTROINTESTINAL NEGATIVE: 1
RESPIRATORY NEGATIVE: 1

## 2022-03-29 NOTE — PROGRESS NOTES
Daniel Muniz is a 80 y.o. female whopresents today for :  Chief Complaint   Patient presents with    Follow-up     left elbow       HPI:     HPI  Pt here with a return of her swollen bursa. We had drained but it has filled back up with fluid. Is somewhat sore.   We attempted to get her in with ortho today but unable     Patient Active Problem List   Diagnosis    Breast cancer metastasized to axillary lymph node ([address]    Encounter for antineoplastic chemotherapy    Anemia    Leukopenia    Encounter for care related to Port-a-Cath    Febrile illness, acute    Pneumonia    Pleural effusion    Paroxysmal A-fib (HCC)    Abnormal EKG    Elevated alkaline phosphatase level    Generalized weakness    Leukocytosis    Weakness    Use of anastrozole (Arimidex)    PAF (paroxysmal atrial fibrillation) (HCC)    Bilateral leg edema- dependant now +1 from trace    S/P cardiac cath- 7/18/17- LM-P, LAD MID AND DISTAL 30%, LCX SMALL PATENT , RCA LARGED PATENT, EDP 5 MMHG, EF 60%- MD RX    Breast cancer metastasized to axillary lymph node, left (HCC)    LBBB (left bundle branch block)    SOB (shortness of breath) on exertion    Cardiomyopathy, probable nonischemic ([address]        Past Medical History:   Diagnosis Date    Breast cancer ([address]     2013 Left Invasive Ductal    Cancer ([address] 2013    Breast LEFT    History of therapeutic radiation     completed 2014    Hx antineoplastic chemo     pre op chemo 2014      Past Surgical History:   Procedure Laterality Date    BREAST LUMPECTOMY Left     2014    BUNIONECTOMY      CARPAL TUNNEL RELEASE Right 11/28/2016    COLONOSCOPY  12/02/2016    AL BX OF BREAST, NEEDLE CORE, IMAGE GUIDE Left     2013 [facility]    AL BX OF BREAST, NEEDLE CORE, IMAGE GUIDE Left     2013 benign    AL BX OF BREAST, NEEDLE CORE, IMAGE GUIDE Left     2012 benign    ROTATOR CUFF REPAIR Right 08/03/2017        TONSILLECTOMY AND ADENOIDECTOMY Bilateral      Family History   Problem Relation Age of Onset    Mult Sclerosis Mother     Breast Cancer Mother 36    Alcohol Abuse Father     Cancer Father      Social History     Tobacco Use    Smoking status: Former Smoker     Packs/day: 0.25     Years: 25.00     Pack years: 6.25     Quit date: 2005     Years since quittin.2    Smokeless tobacco: Never Used   Substance Use Topics    Alcohol use: Yes     Alcohol/week: 0.0 standard drinks     Comment: social      Current Outpatient Medications   Medication Sig Dispense Refill    methylPREDNISolone (MEDROL DOSEPACK) 4 MG tablet Take by mouth. 1 kit 0    cephALEXin (KEFLEX) 500 MG capsule Take 1 capsule by mouth 3 times daily for 7 days 21 capsule 0    spironolactone-hydroCHLOROthiazide (ALDACTAZIDE) 25-25 MG per tablet Take 0.5 tablets by mouth daily 45 tablet 3    Cyanocobalamin (VITAMIN B 12 PO) Take by mouth      dilTIAZem (CARDIZEM CD) 120 MG extended release capsule take 1 capsule by mouth once daily 90 capsule 3    metoprolol tartrate (LOPRESSOR) 25 MG tablet Take 1 tablet by mouth 2 times daily 180 tablet 3    ibuprofen (ADVIL;MOTRIN) 200 MG tablet Take 200 mg by mouth every 6 hours as needed for Pain      Ascorbic Acid (RAF-C PO) Take by mouth      meloxicam (MOBIC) 7.5 MG tablet Take 1 tablet by mouth daily as needed for Pain 90 tablet 1    calcium carbonate (OSCAL) 500 MG TABS tablet Take 500 mg by mouth 2 times daily      Multiple Vitamins-Minerals (MULTIVITAMIN PO) Take 1 tablet by mouth daily       aspirin 325 MG EC tablet Take 1 tablet by mouth daily. 30 tablet 3     No current facility-administered medications for this visit.      No Known Allergies  Health Maintenance   Topic Date Due    Annual Wellness Visit (AWV)  Never done    COVID-19 Vaccine (4 - Booster for Pfizer series) 2022    Potassium monitoring  10/07/2022    Creatinine monitoring  10/07/2022    DTaP/Tdap/Td vaccine (2 - Td or Tdap) 2022    Depression Screen  2023    Flu vaccine  Completed    Shingles Vaccine  Completed    Pneumococcal 65+ yrs at Risk Vaccine  Completed    Hepatitis A vaccine  Aged Out    Hepatitis B vaccine  Aged Out    Hib vaccine  Aged Out    Meningococcal (ACWY) vaccine  Aged Out       Subjective:     Review of Systems   Constitutional: Negative. HENT: Negative. Eyes: Negative. Respiratory: Negative. Cardiovascular: Negative. Gastrointestinal: Negative. Musculoskeletal: Positive for joint swelling. Skin: Negative. Neurological: Negative. Objective:     Vitals:    03/29/22 0812   BP: 122/70   Site: Right Upper Arm   Position: Sitting   Cuff Size: Medium Adult   Pulse: 68   Resp: 16   Temp: 96.4 °F (35.8 °C)   TempSrc: Temporal   SpO2: 96%   Weight: 157 lb (71.2 kg)   Height: 5' 4\" (1.626 m)       Physical Exam  Constitutional:       Appearance: She is well-developed. HENT:      Head: Normocephalic. Right Ear: Tympanic membrane and external ear normal.      Left Ear: Tympanic membrane and external ear normal.      Nose: Nose normal.   Cardiovascular:      Rate and Rhythm: Normal rate and regular rhythm. Heart sounds: Normal heart sounds. No murmur heard. No friction rub. No gallop. Pulmonary:      Effort: Pulmonary effort is normal.      Breath sounds: Normal breath sounds. No wheezing or rales. Abdominal:      General: Bowel sounds are normal.      Palpations: Abdomen is soft. Tenderness: There is no abdominal tenderness. There is no guarding. Musculoskeletal:         General: Normal range of motion. Arms:       Cervical back: Normal range of motion and neck supple. Lymphadenopathy:      Cervical: No cervical adenopathy. Skin:     General: Skin is warm. Neurological:      Mental Status: She is alert and oriented to person, place, and time. Deep Tendon Reflexes: Reflexes are normal and symmetric. Assessment:      Diagnosis Orders   1.  Cardiomyopathy, nonischemic (HCC)  Lipid

## 2022-04-04 LAB
ANAEROBIC CULTURE: NORMAL
BODY FLUID CULTURE, STERILE: NORMAL
GRAM STAIN RESULT: NORMAL

## 2022-04-13 ENCOUNTER — HOSPITAL ENCOUNTER (OUTPATIENT)
Age: 72
Discharge: HOME | End: 2022-04-13
Payer: MEDICARE

## 2022-04-13 DIAGNOSIS — R06.02 SOB (SHORTNESS OF BREATH) ON EXERTION: ICD-10-CM

## 2022-04-13 DIAGNOSIS — I44.7 LBBB (LEFT BUNDLE BRANCH BLOCK): ICD-10-CM

## 2022-04-13 DIAGNOSIS — I42.8 CARDIOMYOPATHY, NONISCHEMIC (HCC): ICD-10-CM

## 2022-04-13 DIAGNOSIS — I48.0 PAF (PAROXYSMAL ATRIAL FIBRILLATION) (HCC): ICD-10-CM

## 2022-04-13 DIAGNOSIS — Z98.890 S/P CARDIAC CATH: ICD-10-CM

## 2022-04-13 DIAGNOSIS — R60.0 BILATERAL LEG EDEMA: ICD-10-CM

## 2022-04-13 DIAGNOSIS — R94.31 ABNORMAL EKG: ICD-10-CM

## 2022-04-13 LAB
ANION GAP SERPL CALCULATED.3IONS-SCNC: 13 MEQ/L (ref 8–16)
BUN BLDV-MCNC: 11 MG/DL (ref 7–22)
CALCIUM SERPL-MCNC: 9.6 MG/DL (ref 8.5–10.5)
CHLORIDE BLD-SCNC: 98 MEQ/L (ref 98–111)
CO2: 30 MEQ/L (ref 23–33)
CREAT SERPL-MCNC: 0.8 MG/DL (ref 0.4–1.2)
GFR SERPL CREATININE-BSD FRML MDRD: 71 ML/MIN/1.73M2
GLUCOSE BLD-MCNC: 124 MG/DL (ref 70–108)
MAGNESIUM: 1.8 MG/DL (ref 1.6–2.4)
POTASSIUM SERPL-SCNC: 3.8 MEQ/L (ref 3.5–5.2)
SODIUM BLD-SCNC: 141 MEQ/L (ref 135–145)

## 2022-04-13 PROCEDURE — 80048 BASIC METABOLIC PNL TOTAL CA: CPT

## 2022-04-13 PROCEDURE — 36415 COLL VENOUS BLD VENIPUNCTURE: CPT

## 2022-04-13 PROCEDURE — 83735 ASSAY OF MAGNESIUM: CPT

## 2022-04-22 ENCOUNTER — TELEPHONE (OUTPATIENT)
Dept: FAMILY MEDICINE CLINIC | Age: 72
End: 2022-04-22

## 2022-04-26 ENCOUNTER — OFFICE VISIT (OUTPATIENT)
Dept: FAMILY MEDICINE CLINIC | Age: 72
End: 2022-04-26
Payer: MEDICARE

## 2022-04-26 VITALS
HEART RATE: 85 BPM | SYSTOLIC BLOOD PRESSURE: 118 MMHG | HEIGHT: 64 IN | OXYGEN SATURATION: 98 % | BODY MASS INDEX: 27.04 KG/M2 | DIASTOLIC BLOOD PRESSURE: 64 MMHG | WEIGHT: 158.4 LBS | TEMPERATURE: 97 F | RESPIRATION RATE: 16 BRPM

## 2022-04-26 DIAGNOSIS — M70.22 OLECRANON BURSITIS OF LEFT ELBOW: ICD-10-CM

## 2022-04-26 PROCEDURE — G8427 DOCREV CUR MEDS BY ELIG CLIN: HCPCS | Performed by: NURSE PRACTITIONER

## 2022-04-26 PROCEDURE — G8400 PT W/DXA NO RESULTS DOC: HCPCS | Performed by: NURSE PRACTITIONER

## 2022-04-26 PROCEDURE — 1123F ACP DISCUSS/DSCN MKR DOCD: CPT | Performed by: NURSE PRACTITIONER

## 2022-04-26 PROCEDURE — 1090F PRES/ABSN URINE INCON ASSESS: CPT | Performed by: NURSE PRACTITIONER

## 2022-04-26 PROCEDURE — 4040F PNEUMOC VAC/ADMIN/RCVD: CPT | Performed by: NURSE PRACTITIONER

## 2022-04-26 PROCEDURE — 1036F TOBACCO NON-USER: CPT | Performed by: NURSE PRACTITIONER

## 2022-04-26 PROCEDURE — 99213 OFFICE O/P EST LOW 20 MIN: CPT | Performed by: NURSE PRACTITIONER

## 2022-04-26 PROCEDURE — G8417 CALC BMI ABV UP PARAM F/U: HCPCS | Performed by: NURSE PRACTITIONER

## 2022-04-26 PROCEDURE — 3017F COLORECTAL CA SCREEN DOC REV: CPT | Performed by: NURSE PRACTITIONER

## 2022-04-26 RX ORDER — CEPHALEXIN 500 MG/1
500 CAPSULE ORAL 3 TIMES DAILY
Qty: 21 CAPSULE | Refills: 0 | Status: SHIPPED | OUTPATIENT
Start: 2022-04-26 | End: 2022-05-03

## 2022-04-26 ASSESSMENT — ENCOUNTER SYMPTOMS
EYES NEGATIVE: 1
GASTROINTESTINAL NEGATIVE: 1
RESPIRATORY NEGATIVE: 1

## 2022-04-26 NOTE — PROGRESS NOTES
Linda Bustamante is a 80 y.o. female whopresents today for :  Chief Complaint   Patient presents with    Elbow Pain     Left elbow x 1 year, no known injury       HPI:     HPI  Pt here for fu of her elbow. Despite it being drained twice, taking atb and having steroid injection it has swollen up again. Reviewed neg culture result and negative cytology.   It is tender at times and at times will feel warm      Patient Active Problem List   Diagnosis    Breast cancer metastasized to axillary lymph node ([address]    Encounter for antineoplastic chemotherapy    Anemia    Leukopenia    Encounter for care related to Port-a-Cath    Febrile illness, acute    Pneumonia    Pleural effusion    Paroxysmal A-fib ([address]    Abnormal EKG    Elevated alkaline phosphatase level    Generalized weakness    Leukocytosis    Weakness    Use of anastrozole (Arimidex)    PAF (paroxysmal atrial fibrillation) (HCC)    Bilateral leg edema- dependant now +1 from trace    S/P cardiac cath- 7/18/17- LM-P, LAD MID AND DISTAL 30%, LCX SMALL PATENT , RCA LARGED PATENT, EDP 5 MMHG, EF 60%- MD RX    Breast cancer metastasized to axillary lymph node, left (HCC)    LBBB (left bundle branch block)    SOB (shortness of breath) on exertion    Cardiomyopathy, probable nonischemic ([address]        Past Medical History:   Diagnosis Date    Breast cancer ([address]     2013 Left Invasive Ductal    Cancer ([address] 2013    Breast LEFT    History of therapeutic radiation     completed 2014    Hx antineoplastic chemo     pre op chemo 2014      Past Surgical History:   Procedure Laterality Date    BREAST LUMPECTOMY Left     2014    BUNIONECTOMY      CARPAL TUNNEL RELEASE Right 11/28/2016    COLONOSCOPY  12/02/2016    MT BX OF BREAST, NEEDLE CORE, IMAGE GUIDE Left     2013 [facility]    MT BX OF BREAST, NEEDLE CORE, IMAGE GUIDE Left     2013 benign    MT BX OF BREAST, NEEDLE CORE, IMAGE GUIDE Left     2012 benign    ROTATOR CUFF REPAIR Right 08/03/2017     TONSILLECTOMY AND ADENOIDECTOMY Bilateral      Family History   Problem Relation Age of Onset    Mult Sclerosis Mother     Breast Cancer Mother 36    Alcohol Abuse Father     Cancer Father      Social History     Tobacco Use    Smoking status: Former Smoker     Packs/day: 0.25     Years: 25.00     Pack years: 6.25     Quit date: 2005     Years since quittin.3    Smokeless tobacco: Never Used   Substance Use Topics    Alcohol use: Yes     Alcohol/week: 0.0 standard drinks     Comment: social      Current Outpatient Medications   Medication Sig Dispense Refill    cephALEXin (KEFLEX) 500 MG capsule Take 1 capsule by mouth 3 times daily for 7 days 21 capsule 0    spironolactone-hydroCHLOROthiazide (ALDACTAZIDE) 25-25 MG per tablet Take 0.5 tablets by mouth daily 45 tablet 3    Cyanocobalamin (VITAMIN B 12 PO) Take by mouth      dilTIAZem (CARDIZEM CD) 120 MG extended release capsule take 1 capsule by mouth once daily 90 capsule 3    metoprolol tartrate (LOPRESSOR) 25 MG tablet Take 1 tablet by mouth 2 times daily 180 tablet 3    Ascorbic Acid (RAF-C PO) Take by mouth      calcium carbonate (OSCAL) 500 MG TABS tablet Take 500 mg by mouth 2 times daily      Multiple Vitamins-Minerals (MULTIVITAMIN PO) Take 1 tablet by mouth daily       aspirin 325 MG EC tablet Take 1 tablet by mouth daily. 30 tablet 3    ibuprofen (ADVIL;MOTRIN) 200 MG tablet Take 200 mg by mouth every 6 hours as needed for Pain (Patient not taking: Reported on 2022)      meloxicam (MOBIC) 7.5 MG tablet Take 1 tablet by mouth daily as needed for Pain (Patient not taking: Reported on 2022) 90 tablet 1     No current facility-administered medications for this visit.      No Known Allergies  Health Maintenance   Topic Date Due    Annual Wellness Visit (AWV)  Never done    COVID-19 Vaccine (4 - Booster for Pfizer series) 2022    DTaP/Tdap/Td vaccine (2 - Td or Tdap) 2022    Depression Screen 03/14/2023    Potassium  04/13/2023    Creatinine  04/13/2023    Flu vaccine  Completed    Shingles Vaccine  Completed    Pneumococcal 65+ years Vaccine  Completed    Hepatitis A vaccine  Aged Out    Hepatitis B vaccine  Aged Out    Hib vaccine  Aged Out    Meningococcal (ACWY) vaccine  Aged Out       Subjective:     Review of Systems   Constitutional: Negative. HENT: Negative. Eyes: Negative. Respiratory: Negative. Cardiovascular: Negative. Gastrointestinal: Negative. Musculoskeletal: Positive for joint swelling. Skin: Negative. Neurological: Negative. Objective:     Vitals:    04/26/22 0927   BP: 118/64   Site: Right Upper Arm   Position: Sitting   Cuff Size: Medium Adult   Pulse: 85   Resp: 16   Temp: 97 °F (36.1 °C)   TempSrc: Temporal   SpO2: 98%   Weight: 158 lb 6.4 oz (71.8 kg)   Height: 5' 4.02\" (1.626 m)       Physical Exam  Constitutional:       Appearance: She is well-developed. HENT:      Head: Normocephalic. Right Ear: Tympanic membrane and external ear normal.      Left Ear: Tympanic membrane and external ear normal.      Nose: Nose normal.   Cardiovascular:      Rate and Rhythm: Normal rate and regular rhythm. Heart sounds: Normal heart sounds. No murmur heard. No friction rub. No gallop. Pulmonary:      Effort: Pulmonary effort is normal.      Breath sounds: Normal breath sounds. No wheezing or rales. Abdominal:      General: Bowel sounds are normal.      Palpations: Abdomen is soft. Tenderness: There is no abdominal tenderness. There is no guarding. Musculoskeletal:         General: Normal range of motion. Left elbow: Deformity present. Tenderness present in olecranon process (has moderate bursa swelling). Cervical back: Normal range of motion and neck supple. Lymphadenopathy:      Cervical: No cervical adenopathy. Skin:     General: Skin is warm.    Neurological:      Mental Status: She is alert and oriented to person, place, and time. Deep Tendon Reflexes: Reflexes are normal and symmetric. Assessment:      Diagnosis Orders   1. Olecranon bursitis of left elbow  cephALEXin (KEFLEX) 500 MG capsule       Plan:      No follow-ups on file. No orders of the defined types were placed in this encounter. Orders Placed This Encounter   Medications    cephALEXin (KEFLEX) 500 MG capsule     Sig: Take 1 capsule by mouth 3 times daily for 7 days     Dispense:  21 capsule     Refill:  0      Start keflex and refer to ortho    Patient given educational materials - seepatient instructions. Discussed use, benefit, and side effects of prescribed medications. All patient questions answered. Pt voiced understanding. Patient agreed withtreatment plan. Follow up as directed.      Electronically signed by MARGARET Adam CNP on 4/26/2022 at 12:49 PM

## 2022-06-07 ENCOUNTER — HOSPITAL ENCOUNTER (OUTPATIENT)
Dept: WOMENS IMAGING | Age: 72
Discharge: HOME | End: 2022-06-07
Payer: MEDICARE

## 2022-06-07 ENCOUNTER — HOSPITAL ENCOUNTER (OUTPATIENT)
Age: 72
Discharge: HOME | End: 2022-06-07
Payer: MEDICARE

## 2022-06-07 DIAGNOSIS — I42.8 CARDIOMYOPATHY, NONISCHEMIC (HCC): ICD-10-CM

## 2022-06-07 DIAGNOSIS — E78.5 HYPERLIPIDEMIA, UNSPECIFIED HYPERLIPIDEMIA TYPE: ICD-10-CM

## 2022-06-07 DIAGNOSIS — C77.3 CARCINOMA OF LEFT BREAST METASTATIC TO AXILLARY LYMPH NODE (HCC): ICD-10-CM

## 2022-06-07 DIAGNOSIS — Z12.31 SCREENING MAMMOGRAM FOR HIGH-RISK PATIENT: ICD-10-CM

## 2022-06-07 DIAGNOSIS — C50.912 CARCINOMA OF LEFT BREAST METASTATIC TO AXILLARY LYMPH NODE (HCC): ICD-10-CM

## 2022-06-07 LAB
BASOPHILS # BLD: 0.9 %
BASOPHILS ABSOLUTE: 0.1 THOU/MM3 (ref 0–0.1)
EOSINOPHIL # BLD: 3.7 %
EOSINOPHILS ABSOLUTE: 0.3 THOU/MM3 (ref 0–0.4)
ERYTHROCYTE [DISTWIDTH] IN BLOOD BY AUTOMATED COUNT: 13.5 % (ref 11.5–14.5)
ERYTHROCYTE [DISTWIDTH] IN BLOOD BY AUTOMATED COUNT: 48.1 FL (ref 35–45)
HCT VFR BLD CALC: 43.2 % (ref 37–47)
HEMOGLOBIN: 13.2 GM/DL (ref 12–16)
IMMATURE GRANS (ABS): 0.02 THOU/MM3 (ref 0–0.07)
IMMATURE GRANULOCYTES: 0.3 %
IONIZED CA: 1.26 MMOL/L (ref 1.12–1.32)
LYMPHOCYTES # BLD: 36 %
LYMPHOCYTES ABSOLUTE: 2.8 THOU/MM3 (ref 1–4.8)
MCH RBC QN AUTO: 29.7 PG (ref 26–33)
MCHC RBC AUTO-ENTMCNC: 30.6 GM/DL (ref 32.2–35.5)
MCV RBC AUTO: 97.1 FL (ref 81–99)
MONOCYTES # BLD: 8 %
MONOCYTES ABSOLUTE: 0.6 THOU/MM3 (ref 0.4–1.3)
NUCLEATED RED BLOOD CELLS: 0 /100 WBC
PLATELET # BLD: 305 THOU/MM3 (ref 130–400)
PMV BLD AUTO: 10.9 FL (ref 9.4–12.4)
RBC # BLD: 4.45 MILL/MM3 (ref 4.2–5.4)
SEG NEUTROPHILS: 51.1 %
SEGMENTED NEUTROPHILS ABSOLUTE COUNT: 4 THOU/MM3 (ref 1.8–7.7)
WBC # BLD: 7.8 THOU/MM3 (ref 4.8–10.8)

## 2022-06-07 PROCEDURE — 77067 SCR MAMMO BI INCL CAD: CPT

## 2022-06-07 PROCEDURE — 82330 ASSAY OF CALCIUM: CPT

## 2022-06-07 PROCEDURE — 80053 COMPREHEN METABOLIC PANEL: CPT

## 2022-06-07 PROCEDURE — 36415 COLL VENOUS BLD VENIPUNCTURE: CPT

## 2022-06-07 PROCEDURE — 80061 LIPID PANEL: CPT

## 2022-06-07 PROCEDURE — 85025 COMPLETE CBC W/AUTO DIFF WBC: CPT

## 2022-06-07 PROCEDURE — 82248 BILIRUBIN DIRECT: CPT

## 2022-06-08 LAB
ALBUMIN SERPL-MCNC: 4.3 G/DL (ref 3.5–5.1)
ALP BLD-CCNC: 114 U/L (ref 38–126)
ALT SERPL-CCNC: 22 U/L (ref 11–66)
ANION GAP SERPL CALCULATED.3IONS-SCNC: 10 MEQ/L (ref 8–16)
AST SERPL-CCNC: 25 U/L (ref 5–40)
BILIRUB SERPL-MCNC: 0.2 MG/DL (ref 0.3–1.2)
BILIRUBIN DIRECT: < 0.2 MG/DL (ref 0–0.3)
BUN BLDV-MCNC: 11 MG/DL (ref 7–22)
CALCIUM SERPL-MCNC: 9.8 MG/DL (ref 8.5–10.5)
CHLORIDE BLD-SCNC: 101 MEQ/L (ref 98–111)
CHOLESTEROL, TOTAL: 236 MG/DL (ref 100–199)
CO2: 28 MEQ/L (ref 23–33)
CREAT SERPL-MCNC: 0.7 MG/DL (ref 0.4–1.2)
GFR SERPL CREATININE-BSD FRML MDRD: 83 ML/MIN/1.73M2
GLUCOSE BLD-MCNC: 80 MG/DL (ref 70–108)
HDLC SERPL-MCNC: 69 MG/DL
LDL CHOLESTEROL CALCULATED: 144 MG/DL
POTASSIUM SERPL-SCNC: 4.3 MEQ/L (ref 3.5–5.2)
SODIUM BLD-SCNC: 139 MEQ/L (ref 135–145)
TOTAL PROTEIN: 6.9 G/DL (ref 6.1–8)
TRIGL SERPL-MCNC: 117 MG/DL (ref 0–199)

## 2022-06-13 ENCOUNTER — TELEPHONE (OUTPATIENT)
Dept: FAMILY MEDICINE CLINIC | Age: 72
End: 2022-06-13

## 2022-06-13 RX ORDER — ATORVASTATIN CALCIUM 10 MG/1
10 TABLET, FILM COATED ORAL DAILY
Qty: 90 TABLET | Refills: 3 | Status: SHIPPED | OUTPATIENT
Start: 2022-06-13

## 2022-06-13 NOTE — TELEPHONE ENCOUNTER
Patient aware and voiced understanding, no concerns voiced at this time. Pt gave verbal ok to start medication. Pt is aware of medication being sent to pharmacy.  Pt will  today

## 2022-06-13 NOTE — TELEPHONE ENCOUNTER
Please call pt. Her labs showed high cholesterol. Otherwise were ok.   Recommend to start low dose of lipitor daily

## 2022-06-16 ENCOUNTER — OFFICE VISIT (OUTPATIENT)
Dept: ONCOLOGY | Age: 72
End: 2022-06-16
Payer: MEDICARE

## 2022-06-16 VITALS
OXYGEN SATURATION: 94 % | BODY MASS INDEX: 26.46 KG/M2 | HEART RATE: 59 BPM | HEIGHT: 64 IN | RESPIRATION RATE: 16 BRPM | DIASTOLIC BLOOD PRESSURE: 61 MMHG | TEMPERATURE: 97.7 F | WEIGHT: 155 LBS | SYSTOLIC BLOOD PRESSURE: 119 MMHG

## 2022-06-16 DIAGNOSIS — C50.912 CARCINOMA OF LEFT BREAST METASTATIC TO AXILLARY LYMPH NODE (HCC): ICD-10-CM

## 2022-06-16 DIAGNOSIS — Z12.31 VISIT FOR SCREENING MAMMOGRAM: Primary | ICD-10-CM

## 2022-06-16 DIAGNOSIS — C77.3 CARCINOMA OF LEFT BREAST METASTATIC TO AXILLARY LYMPH NODE (HCC): ICD-10-CM

## 2022-06-16 PROCEDURE — G8400 PT W/DXA NO RESULTS DOC: HCPCS | Performed by: INTERNAL MEDICINE

## 2022-06-16 PROCEDURE — G8417 CALC BMI ABV UP PARAM F/U: HCPCS | Performed by: INTERNAL MEDICINE

## 2022-06-16 PROCEDURE — 99213 OFFICE O/P EST LOW 20 MIN: CPT | Performed by: INTERNAL MEDICINE

## 2022-06-16 PROCEDURE — 1036F TOBACCO NON-USER: CPT | Performed by: INTERNAL MEDICINE

## 2022-06-16 PROCEDURE — 1090F PRES/ABSN URINE INCON ASSESS: CPT | Performed by: INTERNAL MEDICINE

## 2022-06-16 PROCEDURE — 3017F COLORECTAL CA SCREEN DOC REV: CPT | Performed by: INTERNAL MEDICINE

## 2022-06-16 PROCEDURE — 1123F ACP DISCUSS/DSCN MKR DOCD: CPT | Performed by: INTERNAL MEDICINE

## 2022-06-16 PROCEDURE — G8427 DOCREV CUR MEDS BY ELIG CLIN: HCPCS | Performed by: INTERNAL MEDICINE

## 2022-06-16 NOTE — PROGRESS NOTES
with the patient today:    Hematology 6/7/2022 6/17/2021 6/11/2020   WBC 7.8 7.5 7.8   RBC 4.45 4.46 4.44   HGB 13.2 13.4 13.4   HCT 43.2 41.5 41.9   MCV 97.1 93 94   RDW  12.5 12.7    299 256     Assessment:   1. Breast cancer. 2.  History of unexplained pleural effusion. Plan:   1. Monitor for recurrence of malignancy. 2.  Continue annual mammogram.  3.  Monitor for recurrent pleural effusion. Keo Adler M.D. Medical Director: [facility]  [facility]  [address] [facility], [address] [place] [address], [address] [place] [address], [address] [place] [address] of the [facility] at the [facility]      **This report has been created using voice recognition software. It may contain minor errors which are inherent in voice recognition technology. **

## 2022-09-19 ENCOUNTER — OFFICE VISIT (OUTPATIENT)
Dept: CARDIOLOGY CLINIC | Age: 72
End: 2022-09-19
Payer: MEDICARE

## 2022-09-19 VITALS
DIASTOLIC BLOOD PRESSURE: 52 MMHG | WEIGHT: 154.8 LBS | BODY MASS INDEX: 26.43 KG/M2 | HEIGHT: 64 IN | HEART RATE: 62 BPM | SYSTOLIC BLOOD PRESSURE: 111 MMHG

## 2022-09-19 DIAGNOSIS — I48.0 PAF (PAROXYSMAL ATRIAL FIBRILLATION) (HCC): ICD-10-CM

## 2022-09-19 DIAGNOSIS — Z98.890 S/P CARDIAC CATH: ICD-10-CM

## 2022-09-19 DIAGNOSIS — R94.31 ABNORMAL EKG: ICD-10-CM

## 2022-09-19 DIAGNOSIS — I44.7 LBBB (LEFT BUNDLE BRANCH BLOCK): ICD-10-CM

## 2022-09-19 DIAGNOSIS — R60.0 BILATERAL LEG EDEMA: ICD-10-CM

## 2022-09-19 DIAGNOSIS — R06.02 SOB (SHORTNESS OF BREATH) ON EXERTION: ICD-10-CM

## 2022-09-19 DIAGNOSIS — I42.8 CARDIOMYOPATHY, NONISCHEMIC (HCC): Primary | ICD-10-CM

## 2022-09-19 PROCEDURE — 1090F PRES/ABSN URINE INCON ASSESS: CPT | Performed by: INTERNAL MEDICINE

## 2022-09-19 PROCEDURE — G8427 DOCREV CUR MEDS BY ELIG CLIN: HCPCS | Performed by: INTERNAL MEDICINE

## 2022-09-19 PROCEDURE — 99214 OFFICE O/P EST MOD 30 MIN: CPT | Performed by: INTERNAL MEDICINE

## 2022-09-19 PROCEDURE — 1036F TOBACCO NON-USER: CPT | Performed by: INTERNAL MEDICINE

## 2022-09-19 PROCEDURE — 93000 ELECTROCARDIOGRAM COMPLETE: CPT | Performed by: INTERNAL MEDICINE

## 2022-09-19 PROCEDURE — 3017F COLORECTAL CA SCREEN DOC REV: CPT | Performed by: INTERNAL MEDICINE

## 2022-09-19 PROCEDURE — 1123F ACP DISCUSS/DSCN MKR DOCD: CPT | Performed by: INTERNAL MEDICINE

## 2022-09-19 PROCEDURE — G8400 PT W/DXA NO RESULTS DOC: HCPCS | Performed by: INTERNAL MEDICINE

## 2022-09-19 PROCEDURE — G8417 CALC BMI ABV UP PARAM F/U: HCPCS | Performed by: INTERNAL MEDICINE

## 2022-09-19 NOTE — PROGRESS NOTES
Chief Complaint   Patient presents with    6 Month Follow-Up    Cardiomyopathy   Originally Seen and evaluated in the hospital for for PNA and small pericardial effusionm  And discharged   Came today for F/U              6 month follow up. EKG done today. Denies chest pain, dizziness or palpitations    Sob on exertion chronic    Leg edema chronic    No wt gain    Leg edema +1    Varicose vein    Patient Seen, Chart, Consults notes, Labs, Radiology studies reviewed.         Patient Active Problem List   Diagnosis    Breast cancer metastasized to axillary lymph node ([facility]    Encounter for antineoplastic chemotherapy    Anemia    Leukopenia    Encounter for care related to Port-a-Cath    Febrile illness, acute    Pneumonia    Pleural effusion    Paroxysmal A-fib (HCC)    Abnormal EKG    Elevated alkaline phosphatase level    Generalized weakness    Leukocytosis    Weakness    Use of anastrozole (Arimidex)    PAF (paroxysmal atrial fibrillation) (HCC)    Bilateral leg edema- dependant now +1 from trace    S/P cardiac cath- 7/18/17- LM-P, LAD MID AND DISTAL 30%, LCX SMALL PATENT , RCA LARGED PATENT, EDP 5 MMHG, EF 60%- MD RX    Breast cancer metastasized to axillary lymph node, left (HCC)    LBBB (left bundle branch block)    SOB (shortness of breath) on exertion    Cardiomyopathy, probable nonischemic ([address]       Past Surgical History:   Procedure Laterality Date    BREAST LUMPECTOMY Left     2014    BUNIONECTOMY      CARPAL TUNNEL RELEASE Right 11/28/2016    COLONOSCOPY  12/02/2016    MT BX OF BREAST, NEEDLE CORE, IMAGE GUIDE Left     2013 [facility]    MT BX OF BREAST, NEEDLE CORE, IMAGE GUIDE Left     2013 benign    MT BX OF BREAST, NEEDLE CORE, IMAGE GUIDE Left     2012 benign    ROTATOR CUFF REPAIR Right 08/03/2017        TONSILLECTOMY AND ADENOIDECTOMY Bilateral        No Known Allergies     Family History   Problem Relation Age of Onset    Mult Sclerosis Mother     Alcohol Abuse Father     Cancer Father Social History     Socioeconomic History    Marital status:      Spouse name: Not on file    Number of children: 3    Years of education: Not on file    Highest education level: Not on file   Occupational History    Not on file   Tobacco Use    Smoking status: Former     Packs/day: 0.25     Years: 25.00     Pack years: 6.25     Types: Cigarettes     Quit date: 2005     Years since quittin.7    Smokeless tobacco: Never   Vaping Use    Vaping Use: Never used   Substance and Sexual Activity    Alcohol use: Yes     Alcohol/week: 0.0 standard drinks     Comment: social    Drug use: No    Sexual activity: Not on file   Other Topics Concern    Not on file   Social History Narrative    Not on file     Social Determinants of Health     Financial Resource Strain: Low Risk     Difficulty of Paying Living Expenses: Not hard at all   Food Insecurity: No Food Insecurity    Worried About Running Out of Food in the Last Year: Never true    Ran Out of Food in the Last Year: Never true   Transportation Needs: Not on file   Physical Activity: Not on file   Stress: Not on file   Social Connections: Not on file   Intimate Partner Violence: Not on file   Housing Stability: Not on file       Current Outpatient Prescriptions   Medication Sig Dispense Refill    calcium carbonate (OSCAL) 500 MG TABS tablet Take 500 mg by mouth 2 times daily      anastrozole (ARIMIDEX) 1 MG tablet take 1 tablet by mouth once daily REQUEST FOR 90 DAY RX 90 tablet 3    diltiazem (CARDIZEM SR) 120 MG SR capsule Take 1 capsule by mouth daily 60 capsule 3    flecainide (TAMBOCOR) 50 MG tablet TAKE 1 TABLET TWICE A  tablet 3    Multiple Vitamins-Minerals (MULTIVITAMIN PO) Take  by mouth daily. aspirin 325 MG EC tablet Take 1 tablet by mouth daily. 30 tablet 3    acetaminophen 650 MG TABS Take 650 mg by mouth every 4 hours as needed. 120 tablet 3     No current facility-administered medications for this visit.         Review of Systems -     General ROS: negative  Psychological ROS: negative  Hematological and Lymphatic ROS: No history of blood clots or bleeding disorder. Respiratory ROS: no cough, shortness of breath, or wheezing  Cardiovascular ROS: no chest pain or dyspnea on exertion  Gastrointestinal ROS: negative  Genito-Urinary ROS: no dysuria, trouble voiding, or hematuria  Musculoskeletal ROS: negative  Neurological ROS: no TIA or stroke symptoms  Dermatological ROS: negative      Blood pressure (!) 111/52, pulse 62, height 5' 4\" (1.626 m), weight 154 lb 12.8 oz (70.2 kg), not currently breastfeeding. Physical Examination:    General appearance - alert, well appearing, and in no distress  Mental status - alert, oriented to person, place, and time  Neck - supple, no significant adenopathy, no JVD, or carotid bruits  Chest - clear to auscultation, no wheezes, rales or rhonchi, symmetric air entry  Heart - normal rate, regular rhythm, normal S1, S2, no murmurs, rubs, clicks or gallops  Abdomen - soft, nontender, nondistended, no masses or organomegaly  Neurological - alert, oriented, normal speech, no focal findings or movement disorder noted  Musculoskeletal - no joint tenderness, deformity or swelling  Extremities - peripheral pulses normal, no pedal edema, no clubbing or cyanosis  Skin - normal coloration and turgor, no rashes, no suspicious skin lesions noted    Lab  No results for input(s): CKTOTAL, CKMB, CKMBINDEX, TROPONINI in the last 72 hours.   CBC:   Lab Results   Component Value Date/Time    WBC 7.8 06/07/2022 11:50 AM    RBC 4.45 06/07/2022 11:50 AM    HGB 13.2 06/07/2022 11:50 AM    HCT 43.2 06/07/2022 11:50 AM    MCV 97.1 06/07/2022 11:50 AM    MCH 29.7 06/07/2022 11:50 AM    MCHC 30.6 06/07/2022 11:50 AM    RDW 12.5 06/17/2021 01:11 PM     06/07/2022 11:50 AM    MPV 10.9 06/07/2022 11:50 AM     BMP:    Lab Results   Component Value Date/Time     06/07/2022 11:50 AM    K 4.3 06/07/2022 11:50 AM  06/07/2022 11:50 AM    CO2 28 06/07/2022 11:50 AM    BUN 11 06/07/2022 11:50 AM    LABALBU 4.3 06/07/2022 11:50 AM    CREATININE 0.7 06/07/2022 11:50 AM    CALCIUM 9.8 06/07/2022 11:50 AM    LABGLOM 83 06/07/2022 11:50 AM    GLUCOSE 80 06/07/2022 11:50 AM     Hepatic Function Panel:    Lab Results   Component Value Date/Time    ALKPHOS 114 06/07/2022 11:50 AM    ALT 22 06/07/2022 11:50 AM    AST 25 06/07/2022 11:50 AM    PROT 6.9 06/07/2022 11:50 AM    BILITOT 0.2 06/07/2022 11:50 AM    BILIDIR <0.2 06/07/2022 11:50 AM    LABALBU 4.3 06/07/2022 11:50 AM     Magnesium:    Lab Results   Component Value Date/Time    MG 1.8 04/13/2022 02:06 PM     Warfarin PT/INR:    No components found for: PTPATWAR,  PTINRWAR  HgBA1c:    No results found for: LABA1C  FLP:    Lab Results   Component Value Date/Time    TRIG 117 06/07/2022 11:50 AM    HDL 69 06/07/2022 11:50 AM    LDLCALC 144 06/07/2022 11:50 AM     TSH:    Lab Results   Component Value Date/Time    TSH 1.080 11/12/2014 04:53 AM       EKG 12/4/14-Sinus  Rhythm   -  Nonspecific T-abnormality. ABNORMAL  msec    SUMMARY:    Left ventricle:  Size was normal.  Systolic function was normal. Ejection fraction was estimated in the range   of 55 % to 65 %. There were no regional wall motion abnormalities. Left atrium:  The atrium was mildly dilated. Pericardium:  A small, loculated pericardial effusion was identified circumferential to   the heart. The fluid had no internal echoes. There was no evidence of hemodynamic compromise. COMPARISONS:  Comparison was made with the previous study of 12-Nov-2014. Pericardial   effusion appearance has not changed. Prepared and signed by    Dora Pollack MD    EKG 3/3./15-Sinus  Rhythm    -  Nonspecific T-abnormality. No acute changes  ABNORMAL   msec    EKG 7/7/15-  Sinus  Rhythm   -  Negative precordial T-waves. WITHIN NORMAL LIMITS    EKG 12/7/15  Sinus  Rhythm   -  Nonspecific T-abnormality.   QTC  msec  ABNORMAL     EKG 6/12/17  NSR, prwp, no acute changed  QTC      Conclusions      Summary   Lexiscan EKG stress test is not suggestive for ischemia. Calculated gated LVEF 69 %. The T.I.D. ratio was 1.3 . There was a small sized, mildly severe, reversible myocardial perfusion   defect of the apex and distal anterior wall. The nuclear images is suggestive for mild myocardial ischemia in the apex   and distal segment of anterior wall. Recommendation   Clinical correlation is recommended. Signatures      ----------------------------------------------------------------   Electronically signed by Lynne Solorzano MD (Interpreting   Cardiologist) on 06/26/2017 at 19:33   ----------------------------------------------------------------    ekg  9/10/18  Sinus  Rhythm   WITHIN NORMAL LIMITS   msec        Event monitor  CONCLUSION:  This is a benign event monitor finding with normal sinus  rhythm. No evidence of atrial fibrillation. Heart rate ranging from 72 to  100 beats per minute. No other form of arrhythmia noted. For sure, there is no evidence of atrial fibrillation. Carmen Correa. Nivia Chapman M.D.     D: 04/15/2018 13:04:28    EKG 3/11/19  NSR, LBBB  The LBBB is new compared to ekg sept 2018  No hx of cp     MSEC    EKG 4/17/19  Sinus  Rhythm   -  Nonspecific T-abnormality. ABNORMAL   No more LBBB      Conclusions      Summary   Left ventricle size is normal.   Normal left ventricular wall thickness. There was moderate global hypokinesis of the left ventricle. Systolic function was moderately reduced. Ejection fraction is visually estimated at 45%. Doppler parameters were consistent with abnormal left ventricular   relaxation (grade 1 diastolic dysfunction). Doppler parameters were consistent with abnormal left ventricular   relaxation (grade 1 diastolic dysfunction). The left atrium is Mildly dilated. Moderate mitral regurgitation is present. Signature      ----------------------------------------------------------------   Electronically signed by Dc Vizcaino MD (Interpreting   physician) on 03/27/2019 at 07:02 PM     Conclusions      Summary   Normal left ventricle size and systolic function. Ejection fraction was   estimated at 60 to 65 %. There were no regional left ventricular wall   motion abnormalities and wall thickness was within normal limits. Signature      ----------------------------------------------------------------   Electronically signed by Dc Vizcaino MD (Interpreting   physician) on 10/12/2020 at 06:36 PM   ----------------------------------------------------------------        Conclusions      Summary   No ischemic EKG changes. The nuclear images is suggestive for mild myocardial ischemia in the apex   and distal anterior walls. Gated EF 60%      Recommendation   Clinical correlation is recommended due to poor image quality. Signatures      ----------------------------------------------------------------   Electronically signed by Dc Vizcaino MD (Interpreting   Cardiologist) on 05/07/2019 at 21:06   ----------------------------------------------------------------  EKG 9/13/19  NSR, no acute abn    ekg 9/24/2020  NSR, NO acute abn    ekg 9/28/21  +NSR, LBBBB  New LBBB since 2019  ahd LBBB 03/2019    Ekg 9/19/22  Sinus  Rhythm   -Left bundle branch block and left axis. ABNORMAL       Assessment     Diagnosis Orders   1. Cardiomyopathy, probable nonischemic (HCC)  EKG 12 Lead      2. PAF (paroxysmal atrial fibrillation) ([facility]        3. Bilateral leg edema- dependant now +1 from trace        4. LBBB (left bundle branch block)        5. Abnormal EKG        6. S/P cardiac cath- 7/18/17- LM-P, LAD MID AND DISTAL 30%, LCX SMALL PATENT , RCA LARGED PATENT, EDP 5 MMHG, EF 60%- MD RX        7.  SOB (shortness of breath) on exertion              Previous admission DX  New onset AFB: CVR now Less than 24 hours now IN NSR  CHADS2= 0  TSH, potassium and mag normal No Hx TEMI    Now moderate pericardial effusion by CT done this am  Small pericardial effusion by echo    EKG changes: new T wave inversions leads V4-V6  - trend top      Pleural effusion: s\p Lt thoracentesis 11-16 with 225 ml off    viral syndrome- with possible pericarditis and pleurisy    possible pericarditis and pleurisy    Hx breast CA- s\p lumpectomy and chemo/radiation    Atypical CP upon admit- resolved now      Plan     The  most Current current meds and labs reviewed    Still in nSR  ABN EKG LBBB 03/2019  Echo EF 45% and later 60%  NO CP   Sob on exertion    Cardiomyopathy: improving, no CHF symptoms, no change in clinical condition. Will need periodic echocardiograms depending on symptoms. LBBB new and now resolved and EF 45% new drop from 60% prior  latest EF 60%  No cp  Sob on exertion not new per pat  Off  felcanide 50 bid  Abnormal nuclear stress test- poor image quaility mild ischemia probvaly artifact related - no cp- med RX  Cont lopressor 25 po bid      Limited echo revealed stable small pericardial effusion- resolved  Completed months of colchicine    PAF 2015 and remain in NSR for over two yr- post chemo and radiation  chads of 0, chads vasc 2 ( age and female)  EKG NSR Normal QTC  Rate control for now and consider rhythm control down the line  Cont. Cardizem cd 120 qd   Remain in NSR for over two yr and will cont asa rather   OA considered and she does not want it now  Check event mionitor_ revealed NSR  Cont asa 325 po qd  Any recurrence of atr fib she need OA and to be readdressed  Pat verbalized understanding risk of CVA short of OA    Off  Flecanide 50 BID for drop in ef to 45    HOME BP ADVISED    Leg edema  +1   Cont  Aldactazide  1/2 po qd        D/w the pat the plan of care    Echo and ekg reviewed and doing well    Overall stable and doing well    Discussed use, benefit, and side effects of prescribed medications.  All patient questions answered. Pt voiced understanding. Instructed to continue current medications, diet and exercise. Continue risk factor modification and medical management. Patient agreed with treatment plan. Follow up as directed.       RTC in 6 months    [facility]

## 2022-12-14 RX ORDER — DILTIAZEM HYDROCHLORIDE 120 MG/1
CAPSULE, COATED, EXTENDED RELEASE ORAL
Qty: 90 CAPSULE | Refills: 0 | Status: SHIPPED | OUTPATIENT
Start: 2022-12-14

## 2023-01-20 RX ORDER — DILTIAZEM HYDROCHLORIDE 120 MG/1
CAPSULE, COATED, EXTENDED RELEASE ORAL
Qty: 90 CAPSULE | Refills: 0 | Status: SHIPPED | OUTPATIENT
Start: 2023-01-20

## 2023-03-07 SDOH — ECONOMIC STABILITY: HOUSING INSECURITY
IN THE LAST 12 MONTHS, WAS THERE A TIME WHEN YOU DID NOT HAVE A STEADY PLACE TO SLEEP OR SLEPT IN A SHELTER (INCLUDING NOW)?: NO

## 2023-03-07 SDOH — ECONOMIC STABILITY: INCOME INSECURITY: HOW HARD IS IT FOR YOU TO PAY FOR THE VERY BASICS LIKE FOOD, HOUSING, MEDICAL CARE, AND HEATING?: NOT HARD AT ALL

## 2023-03-07 SDOH — ECONOMIC STABILITY: FOOD INSECURITY: WITHIN THE PAST 12 MONTHS, THE FOOD YOU BOUGHT JUST DIDN'T LAST AND YOU DIDN'T HAVE MONEY TO GET MORE.: NEVER TRUE

## 2023-03-07 SDOH — ECONOMIC STABILITY: TRANSPORTATION INSECURITY
IN THE PAST 12 MONTHS, HAS LACK OF TRANSPORTATION KEPT YOU FROM MEETINGS, WORK, OR FROM GETTING THINGS NEEDED FOR DAILY LIVING?: NO

## 2023-03-07 SDOH — ECONOMIC STABILITY: FOOD INSECURITY: WITHIN THE PAST 12 MONTHS, YOU WORRIED THAT YOUR FOOD WOULD RUN OUT BEFORE YOU GOT MONEY TO BUY MORE.: NEVER TRUE

## 2023-03-08 ENCOUNTER — OFFICE VISIT (OUTPATIENT)
Dept: FAMILY MEDICINE CLINIC | Age: 73
End: 2023-03-08
Payer: MEDICARE

## 2023-03-08 VITALS
HEART RATE: 61 BPM | SYSTOLIC BLOOD PRESSURE: 124 MMHG | WEIGHT: 163 LBS | DIASTOLIC BLOOD PRESSURE: 70 MMHG | TEMPERATURE: 97.7 F | BODY MASS INDEX: 27.98 KG/M2 | RESPIRATION RATE: 14 BRPM

## 2023-03-08 DIAGNOSIS — I42.8 CARDIOMYOPATHY, NONISCHEMIC (HCC): ICD-10-CM

## 2023-03-08 DIAGNOSIS — C77.3 CARCINOMA OF LEFT BREAST METASTATIC TO AXILLARY LYMPH NODE (HCC): ICD-10-CM

## 2023-03-08 DIAGNOSIS — C50.912 CARCINOMA OF LEFT BREAST METASTATIC TO AXILLARY LYMPH NODE (HCC): ICD-10-CM

## 2023-03-08 DIAGNOSIS — I48.0 PAF (PAROXYSMAL ATRIAL FIBRILLATION) (HCC): ICD-10-CM

## 2023-03-08 DIAGNOSIS — L98.9 SKIN LESION OF LEFT LEG: Primary | ICD-10-CM

## 2023-03-08 PROCEDURE — 99213 OFFICE O/P EST LOW 20 MIN: CPT | Performed by: NURSE PRACTITIONER

## 2023-03-08 PROCEDURE — 1123F ACP DISCUSS/DSCN MKR DOCD: CPT | Performed by: NURSE PRACTITIONER

## 2023-03-08 PROCEDURE — G8417 CALC BMI ABV UP PARAM F/U: HCPCS | Performed by: NURSE PRACTITIONER

## 2023-03-08 PROCEDURE — G8427 DOCREV CUR MEDS BY ELIG CLIN: HCPCS | Performed by: NURSE PRACTITIONER

## 2023-03-08 PROCEDURE — G8484 FLU IMMUNIZE NO ADMIN: HCPCS | Performed by: NURSE PRACTITIONER

## 2023-03-08 PROCEDURE — G8400 PT W/DXA NO RESULTS DOC: HCPCS | Performed by: NURSE PRACTITIONER

## 2023-03-08 PROCEDURE — 1036F TOBACCO NON-USER: CPT | Performed by: NURSE PRACTITIONER

## 2023-03-08 PROCEDURE — 1090F PRES/ABSN URINE INCON ASSESS: CPT | Performed by: NURSE PRACTITIONER

## 2023-03-08 PROCEDURE — 3017F COLORECTAL CA SCREEN DOC REV: CPT | Performed by: NURSE PRACTITIONER

## 2023-03-08 ASSESSMENT — PATIENT HEALTH QUESTIONNAIRE - PHQ9
SUM OF ALL RESPONSES TO PHQ QUESTIONS 1-9: 0
SUM OF ALL RESPONSES TO PHQ QUESTIONS 1-9: 0
2. FEELING DOWN, DEPRESSED OR HOPELESS: 0
SUM OF ALL RESPONSES TO PHQ9 QUESTIONS 1 & 2: 0
SUM OF ALL RESPONSES TO PHQ QUESTIONS 1-9: 0
SUM OF ALL RESPONSES TO PHQ QUESTIONS 1-9: 0
1. LITTLE INTEREST OR PLEASURE IN DOING THINGS: 0

## 2023-03-08 ASSESSMENT — ENCOUNTER SYMPTOMS
EYES NEGATIVE: 1
COLOR CHANGE: 1
RESPIRATORY NEGATIVE: 1
GASTROINTESTINAL NEGATIVE: 1

## 2023-03-08 NOTE — PROGRESS NOTES
Natalia Bernal is a 71 y.o. female whopresents today for :  Chief Complaint   Patient presents with    Other     Spot on right lower leg        HPI:     HPI     Pt here for skin lesion. On left leg. Is enlarging. Appears to be sore and not healing.     Patient Active Problem List   Diagnosis    Breast cancer metastasized to axillary lymph node ([facility] [address]    Encounter for antineoplastic chemotherapy    Anemia    Leukopenia    Encounter for care related to Port-a-Cath    Febrile illness, acute    Pneumonia    Pleural effusion    Paroxysmal A-fib (HCC)    Abnormal EKG    Elevated alkaline phosphatase level    Generalized weakness    Leukocytosis    Weakness    Use of anastrozole (Arimidex)    PAF (paroxysmal atrial fibrillation) (HCC)    Bilateral leg edema- dependant now +1 from trace    S/P cardiac cath- 7/18/17- LM-P, LAD MID AND DISTAL 30%, LCX SMALL PATENT , RCA LARGED PATENT, EDP 5 MMHG, EF 60%- MD RX    Breast cancer metastasized to axillary lymph node, left (HCC)    LBBB (left bundle branch block)    SOB (shortness of breath) on exertion    Cardiomyopathy, probable nonischemic ([address]        Past Medical History:   Diagnosis Date    Breast cancer ([address]     2013 Left Invasive Ductal    Cancer ([address] 2013    Breast LEFT    History of therapeutic radiation     completed 2014    Hx antineoplastic chemo     pre op chemo 2014      Past Surgical History:   Procedure Laterality Date    BREAST LUMPECTOMY Left     2014    BUNIONECTOMY      CARPAL TUNNEL RELEASE Right 11/28/2016    COLONOSCOPY  12/02/2016    ME BX OF BREAST, NEEDLE CORE, IMAGE GUIDE Left     2013 [facility]    ME BX OF BREAST, NEEDLE CORE, IMAGE GUIDE Left     2013 benign    ME BX OF BREAST, NEEDLE CORE, IMAGE GUIDE Left     2012 benign    ROTATOR CUFF REPAIR Right 08/03/2017        TONSILLECTOMY AND ADENOIDECTOMY Bilateral      Family History   Problem Relation Age of Onset    Mult Sclerosis Mother     Alcohol Abuse Father     Cancer Father      Social History     Tobacco Use    Smoking status: Former     Packs/day: 0.25     Years: 25.00     Pack years: 6.25     Types: Cigarettes     Quit date: 2005     Years since quittin.1    Smokeless tobacco: Never   Substance Use Topics    Alcohol use: Yes     Alcohol/week: 0.0 standard drinks     Comment: social      Current Outpatient Medications   Medication Sig Dispense Refill    dilTIAZem (CARDIZEM CD) 120 MG extended release capsule take 1 capsule by mouth once daily 90 capsule 0    metoprolol tartrate (LOPRESSOR) 25 MG tablet Take 1 tablet by mouth 2 times daily 180 tablet 1    atorvastatin (LIPITOR) 10 MG tablet Take 1 tablet by mouth daily 90 tablet 3    spironolactone-hydroCHLOROthiazide (ALDACTAZIDE) 25-25 MG per tablet Take 0.5 tablets by mouth daily 45 tablet 3    Ascorbic Acid (RAF-C PO) Take by mouth      calcium carbonate (OSCAL) 500 MG TABS tablet Take 500 mg by mouth 2 times daily      Multiple Vitamins-Minerals (MULTIVITAMIN PO) Take 1 tablet by mouth daily       aspirin 325 MG EC tablet Take 1 tablet by mouth daily. 30 tablet 3    ibuprofen (ADVIL;MOTRIN) 200 MG tablet Take 200 mg by mouth every 6 hours as needed for Pain (Patient not taking: No sig reported)      meloxicam (MOBIC) 7.5 MG tablet Take 1 tablet by mouth daily as needed for Pain (Patient not taking: No sig reported) 90 tablet 1     No current facility-administered medications for this visit. No Known Allergies  Health Maintenance   Topic Date Due    Annual Wellness Visit (AWV)  Never done    COVID-19 Vaccine (4 - Booster for Pfizer series) 10/20/2022    DTaP/Tdap/Td vaccine (2 - Td or Tdap) 2022    Depression Screen  2024    Flu vaccine  Completed    Shingles vaccine  Completed    Pneumococcal 65+ years Vaccine  Completed    Hepatitis A vaccine  Aged Out    Hib vaccine  Aged Out    Meningococcal (ACWY) vaccine  Aged Out       Subjective:     Review of Systems   Constitutional: Negative. HENT: Negative.      Eyes: Negative. Respiratory: Negative. Cardiovascular: Negative. Gastrointestinal: Negative. Musculoskeletal: Negative. Skin:  Positive for color change. Neurological: Negative. Objective:     Vitals:    03/08/23 0837   BP: 124/70   Site: Left Upper Arm   Position: Sitting   Cuff Size: Large Adult   Pulse: 61   Resp: 14   Temp: 97.7 °F (36.5 °C)   TempSrc: Temporal   Weight: 163 lb (73.9 kg)       Physical Exam  Constitutional:       Appearance: She is well-developed. HENT:      Head: Normocephalic. Right Ear: Tympanic membrane and external ear normal.      Left Ear: Tympanic membrane and external ear normal.      Nose: Nose normal.   Cardiovascular:      Rate and Rhythm: Normal rate and regular rhythm. Heart sounds: Normal heart sounds. No murmur heard. No friction rub. No gallop. Pulmonary:      Effort: Pulmonary effort is normal.      Breath sounds: Normal breath sounds. No wheezing or rales. Abdominal:      General: Bowel sounds are normal.      Palpations: Abdomen is soft. Tenderness: There is no abdominal tenderness. There is no guarding. Musculoskeletal:         General: Normal range of motion. Cervical back: Normal range of motion and neck supple. Lymphadenopathy:      Cervical: No cervical adenopathy. Skin:     General: Skin is warm. Neurological:      Mental Status: She is alert and oriented to person, place, and time. Deep Tendon Reflexes: Reflexes are normal and symmetric. Assessment:      Diagnosis Orders   1. Skin lesion of left leg  [facility] - Michae Klinefelter, DO, Dermatology, [address] [place] [address]      2. Carcinoma of left breast metastatic to axillary lymph node ([facility] [address]  [facility] - Kati Bowen DO, Hematology & Oncology, [address] [place] [address]      3. Cardiomyopathy, nonischemic ([facility] [address]  ANH Bowen DO, Hematology & Oncology, [address] [place] [address]      4. PAF (paroxysmal atrial fibrillation) ([facility] [address]            Plan:      No follow-ups on file.        Orders Placed This Encounter Procedures    [facility] - Jasbir Julien DO, Dermatology, [address] [facility]     Referral Priority:   Routine     Referral Type:   Eval and Treat     Referral Reason:   Specialty Services Required     Referred to Provider:   Trisha Car DO     Requested Specialty:   Dermatology     Number of Visits Requested:   1    [facility] - Mario Godinez DO, Hematology & Oncology, [address] [facility]     Referral Priority:   Routine     Referral Type:   Eval and Treat     Referral Reason:   Specialty Services Required     Referred to Provider:   Nicholas Carballo DO     Requested Specialty:   Hematology and Oncology     Number of Visits Requested:   1     No orders of the defined types were placed in this encounter. Suspect skin lesion is squamous or basal cell. Refer to derm for removal  With her ongoing cancer monitoring referred to dr Lacey Samano. Her previous oncologist left      Patient given educational materials - seepatient instructions. Discussed use, benefit, and side effects of prescribed medications. All patient questions answered. Pt voiced understanding. Patient agreed withtreatment plan. Follow up as directed.      Electronically signed by MARGARET Gonzalez CNP on 3/8/2023 at 12:35 PM

## 2023-03-21 ENCOUNTER — TELEPHONE (OUTPATIENT)
Dept: FAMILY MEDICINE CLINIC | Age: 73
End: 2023-03-21

## 2023-03-21 NOTE — TELEPHONE ENCOUNTER
Had previous breast cancer. Is in remission and being monitored. Her oncologist left town and looking for new provider to monitor her.

## 2023-03-21 NOTE — TELEPHONE ENCOUNTER
Dr Davidson Espinoza office called stating they received a referral by fax on 3-15-23 on the patient. She stated they do referrals over the phone. They are unsure why Dr Laurita Carrasco received the referral/ what the patient needs. They need to know who the patient has seen in the past for this  2. When was she diagnosed with cancer if it is in fact cancer AND the specific diagnosis  3.  Has she already had treatment          Call Dr Davidson Espinoza office back [phone]

## 2023-04-18 ENCOUNTER — NURSE ONLY (OUTPATIENT)
Dept: LAB | Age: 73
End: 2023-04-18

## 2023-04-18 ENCOUNTER — OFFICE VISIT (OUTPATIENT)
Dept: FAMILY MEDICINE CLINIC | Age: 73
End: 2023-04-18
Payer: MEDICARE

## 2023-04-18 VITALS
RESPIRATION RATE: 16 BRPM | DIASTOLIC BLOOD PRESSURE: 76 MMHG | OXYGEN SATURATION: 98 % | BODY MASS INDEX: 27.31 KG/M2 | HEIGHT: 64 IN | TEMPERATURE: 97.5 F | SYSTOLIC BLOOD PRESSURE: 124 MMHG | HEART RATE: 68 BPM | WEIGHT: 160 LBS

## 2023-04-18 DIAGNOSIS — M25.562 ARTHRALGIA OF LEFT KNEE: ICD-10-CM

## 2023-04-18 DIAGNOSIS — M79.604 PAIN OF RIGHT LOWER EXTREMITY: Primary | ICD-10-CM

## 2023-04-18 DIAGNOSIS — M79.604 PAIN OF RIGHT LOWER EXTREMITY: ICD-10-CM

## 2023-04-18 LAB — D DIMER PPP IA.FEU-MCNC: 398 NG/ML FEU (ref 0–500)

## 2023-04-18 PROCEDURE — G8427 DOCREV CUR MEDS BY ELIG CLIN: HCPCS | Performed by: NURSE PRACTITIONER

## 2023-04-18 PROCEDURE — 1036F TOBACCO NON-USER: CPT | Performed by: NURSE PRACTITIONER

## 2023-04-18 PROCEDURE — G8417 CALC BMI ABV UP PARAM F/U: HCPCS | Performed by: NURSE PRACTITIONER

## 2023-04-18 PROCEDURE — 1123F ACP DISCUSS/DSCN MKR DOCD: CPT | Performed by: NURSE PRACTITIONER

## 2023-04-18 PROCEDURE — 1090F PRES/ABSN URINE INCON ASSESS: CPT | Performed by: NURSE PRACTITIONER

## 2023-04-18 PROCEDURE — G8400 PT W/DXA NO RESULTS DOC: HCPCS | Performed by: NURSE PRACTITIONER

## 2023-04-18 PROCEDURE — 99214 OFFICE O/P EST MOD 30 MIN: CPT | Performed by: NURSE PRACTITIONER

## 2023-04-18 PROCEDURE — 3017F COLORECTAL CA SCREEN DOC REV: CPT | Performed by: NURSE PRACTITIONER

## 2023-04-18 RX ORDER — MELOXICAM 7.5 MG/1
7.5 TABLET ORAL DAILY PRN
Qty: 90 TABLET | Refills: 1 | Status: CANCELLED | OUTPATIENT
Start: 2023-04-18

## 2023-04-18 RX ORDER — MELOXICAM 7.5 MG/1
7.5 TABLET ORAL DAILY PRN
Qty: 90 TABLET | Refills: 1 | Status: SHIPPED | OUTPATIENT
Start: 2023-04-18

## 2023-04-18 RX ORDER — ATORVASTATIN CALCIUM 10 MG/1
10 TABLET, FILM COATED ORAL DAILY
Qty: 90 TABLET | Refills: 3 | Status: SHIPPED | OUTPATIENT
Start: 2023-04-18

## 2023-04-18 ASSESSMENT — ENCOUNTER SYMPTOMS
RESPIRATORY NEGATIVE: 1
GASTROINTESTINAL NEGATIVE: 1
EYES NEGATIVE: 1

## 2023-04-18 NOTE — PROGRESS NOTES
extremity  D-Dimer, Quantitative      2. Arthralgia of left knee  meloxicam (MOBIC) 7.5 MG tablet          Plan:      No follow-ups on file. will check d dimer to rule out dvt. If no dvt likey has achilles/gastroc injury. May benefit from walking boot/PT      Orders Placed This Encounter   Procedures    D-Dimer, Quantitative     Standing Status:   Future     Number of Occurrences:   1     Standing Expiration Date:   4/18/2024     Orders Placed This Encounter   Medications    atorvastatin (LIPITOR) 10 MG tablet     Sig: Take 1 tablet by mouth daily     Dispense:  90 tablet     Refill:  3    meloxicam (MOBIC) 7.5 MG tablet     Sig: Take 1 tablet by mouth daily as needed for Pain     Dispense:  90 tablet     Refill:  1          Patient given educational materials - seepatient instructions. Discussed use, benefit, and side effects of prescribed medications. All patient questions answered. Pt voiced understanding. Patient agreed withtreatment plan. Follow up as directed.      Electronically signed by MARGARET Marina CNP on 4/18/2023 at 5:13 PM

## 2023-04-19 ENCOUNTER — TELEPHONE (OUTPATIENT)
Dept: FAMILY MEDICINE CLINIC | Age: 73
End: 2023-04-19

## 2023-04-19 DIAGNOSIS — S86.111A STRAIN OF RIGHT GASTROCNEMIUS MUSCLE, INITIAL ENCOUNTER: Primary | ICD-10-CM

## 2023-04-19 NOTE — TELEPHONE ENCOUNTER
Patient aware and voiced understanding, no concerns voiced at this time.    Script sent to TACHO quach per pt

## 2023-04-19 NOTE — TELEPHONE ENCOUNTER
Please let know cam walker was ok. Recommend to start a walking boot. Wear it most of the day with taking her foot out a few times a day for gentle stretching.   If it is not improving should consult ortho

## 2023-06-07 ENCOUNTER — TELEPHONE (OUTPATIENT)
Dept: FAMILY MEDICINE CLINIC | Age: 73
End: 2023-06-07

## 2023-06-07 NOTE — TELEPHONE ENCOUNTER
Pt has 2 oncology appts scheduled and is asking which one you feel she should keep    Rosie Thorpe PA-C on 7/6/23.   The office switched her appt from Dr Marycruz Rodriguez when he left and did not notify pt that they did this    Dr Mary Arellano on 6/19/23

## 2023-06-07 NOTE — TELEPHONE ENCOUNTER
The appt with roberta. Their office has all of her old records so they will have some continuity of care. However they are having trouble establishing a long term oncologist so who she sees may be unpredictable in the future. With Dr Mary Arellano. He will have to learn her case and familiarize himself with Fly Hernandez but he is a long term established oncologist and will likely be around until she no longer needs monitored.   I would go with dr Jinny Campuzano but it is up to the patient

## 2023-06-08 ENCOUNTER — HOSPITAL ENCOUNTER (OUTPATIENT)
Dept: WOMENS IMAGING | Age: 73
Discharge: HOME | End: 2023-06-08
Payer: MEDICARE

## 2023-06-08 DIAGNOSIS — Z12.31 VISIT FOR SCREENING MAMMOGRAM: ICD-10-CM

## 2023-06-08 PROCEDURE — 77063 BREAST TOMOSYNTHESIS BI: CPT

## 2023-06-09 RX ORDER — DILTIAZEM HYDROCHLORIDE 120 MG/1
CAPSULE, COATED, EXTENDED RELEASE ORAL
Qty: 90 CAPSULE | Refills: 1 | Status: SHIPPED | OUTPATIENT
Start: 2023-06-09

## 2023-06-22 ENCOUNTER — TELEPHONE (OUTPATIENT)
Dept: CARDIOLOGY CLINIC | Age: 73
End: 2023-06-22

## 2023-07-06 ENCOUNTER — HOSPITAL ENCOUNTER (OUTPATIENT)
Dept: WOMENS IMAGING | Age: 73
Discharge: HOME | End: 2023-07-06
Attending: INTERNAL MEDICINE
Payer: MEDICARE

## 2023-07-06 DIAGNOSIS — Z78.0 ASYMPTOMATIC MENOPAUSAL STATE: ICD-10-CM

## 2023-07-06 PROCEDURE — 77080 DXA BONE DENSITY AXIAL: CPT

## 2023-09-06 ENCOUNTER — HOSPITAL ENCOUNTER (OUTPATIENT)
Age: 73
Discharge: HOME | End: 2023-09-06
Payer: MEDICARE

## 2023-09-06 DIAGNOSIS — R60.0 BILATERAL LEG EDEMA: ICD-10-CM

## 2023-09-06 DIAGNOSIS — I42.8 CARDIOMYOPATHY, NONISCHEMIC (HCC): ICD-10-CM

## 2023-09-06 DIAGNOSIS — E78.00 HYPERCHOLESTEREMIA: ICD-10-CM

## 2023-09-06 DIAGNOSIS — R06.02 SOB (SHORTNESS OF BREATH) ON EXERTION: ICD-10-CM

## 2023-09-06 DIAGNOSIS — I48.0 PAF (PAROXYSMAL ATRIAL FIBRILLATION) (HCC): ICD-10-CM

## 2023-09-06 DIAGNOSIS — Z98.890 S/P CARDIAC CATH: ICD-10-CM

## 2023-09-06 DIAGNOSIS — I44.7 LBBB (LEFT BUNDLE BRANCH BLOCK): ICD-10-CM

## 2023-09-06 LAB
ALBUMIN SERPL BCG-MCNC: 4.2 G/DL (ref 3.5–5.1)
ALP SERPL-CCNC: 120 U/L (ref 38–126)
ALT SERPL W/O P-5'-P-CCNC: 17 U/L (ref 11–66)
ANION GAP SERPL CALC-SCNC: 14 MEQ/L (ref 8–16)
AST SERPL-CCNC: 22 U/L (ref 5–40)
BILIRUB CONJ SERPL-MCNC: < 0.2 MG/DL (ref 0–0.3)
BILIRUB SERPL-MCNC: 0.3 MG/DL (ref 0.3–1.2)
BUN SERPL-MCNC: 20 MG/DL (ref 7–22)
CALCIUM SERPL-MCNC: 9.9 MG/DL (ref 8.5–10.5)
CHLORIDE SERPL-SCNC: 100 MEQ/L (ref 98–111)
CHOLESTEROL, FASTING: 185 MG/DL (ref 100–199)
CO2 SERPL-SCNC: 27 MEQ/L (ref 23–33)
CREAT SERPL-MCNC: 0.9 MG/DL (ref 0.4–1.2)
GFR SERPL CREATININE-BSD FRML MDRD: > 60 ML/MIN/1.73M2
GLUCOSE SERPL-MCNC: 91 MG/DL (ref 70–108)
HDLC SERPL-MCNC: 68 MG/DL
LDLC SERPL CALC-MCNC: 98 MG/DL
MAGNESIUM SERPL-MCNC: 1.9 MG/DL (ref 1.6–2.4)
POTASSIUM SERPL-SCNC: 4.6 MEQ/L (ref 3.5–5.2)
PROT SERPL-MCNC: 6.9 G/DL (ref 6.1–8)
SODIUM SERPL-SCNC: 141 MEQ/L (ref 135–145)
TRIGLYCERIDE, FASTING: 93 MG/DL (ref 0–199)

## 2023-09-06 PROCEDURE — 36415 COLL VENOUS BLD VENIPUNCTURE: CPT

## 2023-09-06 PROCEDURE — 80061 LIPID PANEL: CPT

## 2023-09-06 PROCEDURE — 82248 BILIRUBIN DIRECT: CPT

## 2023-09-06 PROCEDURE — 80053 COMPREHEN METABOLIC PANEL: CPT

## 2023-09-06 PROCEDURE — 83735 ASSAY OF MAGNESIUM: CPT

## 2023-09-07 ASSESSMENT — PATIENT HEALTH QUESTIONNAIRE - PHQ9
1. LITTLE INTEREST OR PLEASURE IN DOING THINGS: 0
SUM OF ALL RESPONSES TO PHQ QUESTIONS 1-9: 0
SUM OF ALL RESPONSES TO PHQ QUESTIONS 1-9: 0
SUM OF ALL RESPONSES TO PHQ9 QUESTIONS 1 & 2: 0
SUM OF ALL RESPONSES TO PHQ QUESTIONS 1-9: 0
2. FEELING DOWN, DEPRESSED OR HOPELESS: 0
SUM OF ALL RESPONSES TO PHQ QUESTIONS 1-9: 0

## 2023-09-07 ASSESSMENT — LIFESTYLE VARIABLES
HOW MANY STANDARD DRINKS CONTAINING ALCOHOL DO YOU HAVE ON A TYPICAL DAY: 1 OR 2
HOW OFTEN DO YOU HAVE A DRINK CONTAINING ALCOHOL: 2-4 TIMES A MONTH

## 2023-09-11 ENCOUNTER — OFFICE VISIT (OUTPATIENT)
Dept: FAMILY MEDICINE CLINIC | Age: 73
End: 2023-09-11
Payer: MEDICARE

## 2023-09-11 VITALS
TEMPERATURE: 97.4 F | BODY MASS INDEX: 26.77 KG/M2 | SYSTOLIC BLOOD PRESSURE: 108 MMHG | DIASTOLIC BLOOD PRESSURE: 58 MMHG | HEART RATE: 72 BPM | RESPIRATION RATE: 17 BRPM | OXYGEN SATURATION: 96 % | HEIGHT: 64 IN | WEIGHT: 156.8 LBS

## 2023-09-11 DIAGNOSIS — D68.69 SECONDARY HYPERCOAGULABLE STATE (HCC): ICD-10-CM

## 2023-09-11 DIAGNOSIS — I48.0 PAF (PAROXYSMAL ATRIAL FIBRILLATION) (HCC): ICD-10-CM

## 2023-09-11 DIAGNOSIS — Z00.00 INITIAL MEDICARE ANNUAL WELLNESS VISIT: Primary | ICD-10-CM

## 2023-09-11 PROCEDURE — 1123F ACP DISCUSS/DSCN MKR DOCD: CPT | Performed by: NURSE PRACTITIONER

## 2023-09-11 PROCEDURE — G0438 PPPS, INITIAL VISIT: HCPCS | Performed by: NURSE PRACTITIONER

## 2023-09-11 PROCEDURE — 3017F COLORECTAL CA SCREEN DOC REV: CPT | Performed by: NURSE PRACTITIONER

## 2023-09-11 ASSESSMENT — PATIENT HEALTH QUESTIONNAIRE - PHQ9
SUM OF ALL RESPONSES TO PHQ QUESTIONS 1-9: 0
SUM OF ALL RESPONSES TO PHQ QUESTIONS 1-9: 0
1. LITTLE INTEREST OR PLEASURE IN DOING THINGS: 0
SUM OF ALL RESPONSES TO PHQ9 QUESTIONS 1 & 2: 0
2. FEELING DOWN, DEPRESSED OR HOPELESS: 0
SUM OF ALL RESPONSES TO PHQ QUESTIONS 1-9: 0
SUM OF ALL RESPONSES TO PHQ QUESTIONS 1-9: 0

## 2023-09-11 ASSESSMENT — LIFESTYLE VARIABLES
HOW OFTEN DO YOU HAVE A DRINK CONTAINING ALCOHOL: 2-4 TIMES A MONTH
HOW MANY STANDARD DRINKS CONTAINING ALCOHOL DO YOU HAVE ON A TYPICAL DAY: 1 OR 2

## 2023-09-27 DIAGNOSIS — M25.562 ARTHRALGIA OF LEFT KNEE: ICD-10-CM

## 2023-09-27 RX ORDER — MELOXICAM 7.5 MG/1
TABLET ORAL
Qty: 90 TABLET | Refills: 1 | OUTPATIENT
Start: 2023-09-27

## 2023-10-03 ENCOUNTER — TELEPHONE (OUTPATIENT)
Dept: FAMILY MEDICINE CLINIC | Age: 73
End: 2023-10-03

## 2023-10-03 NOTE — TELEPHONE ENCOUNTER
Pt called stating that she tested positive for COVID on Saturday or Sunday, she cant remember what exact day. Her sx's began Thursday of last week. She has been using OTC cold remedies and her sx's have been improving other than her being extremely fatigued. Writer advised her to continue treating as she has been. She was also advised to stay hydrated, and be sure to get up and walk every half hour or so. She verbalized understanding.

## 2023-10-11 ENCOUNTER — OFFICE VISIT (OUTPATIENT)
Dept: CARDIOLOGY CLINIC | Age: 73
End: 2023-10-11

## 2023-10-11 VITALS
HEIGHT: 64 IN | HEART RATE: 60 BPM | DIASTOLIC BLOOD PRESSURE: 63 MMHG | WEIGHT: 158.6 LBS | SYSTOLIC BLOOD PRESSURE: 120 MMHG | BODY MASS INDEX: 27.08 KG/M2

## 2023-10-11 DIAGNOSIS — I42.8 CARDIOMYOPATHY, NONISCHEMIC (HCC): Primary | ICD-10-CM

## 2023-10-11 DIAGNOSIS — R06.02 SOB (SHORTNESS OF BREATH) ON EXERTION: ICD-10-CM

## 2023-10-11 DIAGNOSIS — E78.5 DYSLIPIDEMIA: ICD-10-CM

## 2023-10-11 DIAGNOSIS — I44.7 LBBB (LEFT BUNDLE BRANCH BLOCK): ICD-10-CM

## 2023-10-11 DIAGNOSIS — R60.0 BILATERAL LEG EDEMA: ICD-10-CM

## 2023-10-11 DIAGNOSIS — Z98.890 S/P CARDIAC CATH: ICD-10-CM

## 2023-10-11 DIAGNOSIS — I48.0 PAF (PAROXYSMAL ATRIAL FIBRILLATION) (HCC): ICD-10-CM

## 2023-10-11 RX ORDER — SPIRONOLACTONE AND HYDROCHLOROTHIAZIDE 25; 25 MG/1; MG/1
0.5 TABLET ORAL DAILY
Qty: 45 TABLET | Refills: 3 | Status: SHIPPED | OUTPATIENT
Start: 2023-10-11

## 2023-10-11 NOTE — PROGRESS NOTES
abn    ekg 9/24/2020  NSR, NO acute abn    ekg 9/28/21  +NSR, LBBBB  New LBBB since 2019  ahd LBBB 03/2019    Ekg 9/19/22  Sinus  Rhythm   -Left bundle branch block and left axis. ABNORMAL       Ekg 10/11/23  Normal sinus   Rhythm   Low voltage in precordial leads. LBBB    ABNORMAL       Assessment     Diagnosis Orders   1. Cardiomyopathy, nonischemic (HCC)  EKG 12 Lead      2. Bilateral leg edema- dependant now +1  to +2 from +1        3. PAF (paroxysmal atrial fibrillation) ([address])        4. Dyslipidemia        5. LBBB (left bundle branch block)        6. S/P cardiac cath- 7/18/17- LM-P, LAD MID AND DISTAL 30%, LCX SMALL PATENT , RCA LARGED PATENT, EDP 5 MMHG, EF 60%- MD RX        7. SOB (shortness of breath) on exertion                Previous admission DX  New onset AFB: CVR now Less than 24 hours now IN NSR  CHADS2= 0  TSH, potassium and mag normal No Hx TEMI    Now moderate pericardial effusion by CT done this am  Small pericardial effusion by echo    EKG changes: new T wave inversions leads V4-V6  - trend top      Pleural effusion: s\p Lt thoracentesis 11-16 with 225 ml off    viral syndrome- with possible pericarditis and pleurisy    possible pericarditis and pleurisy    Hx breast CA- s\p lumpectomy and chemo/radiation    Atypical CP upon admit- resolved now      Plan     The   Current current meds and labs reviewed    Still in nSR  ABN EKG LBBB 03/2019  Echo EF 45% and later 60%  NO CP   Sob on exertion    Cardiomyopathy: improving, no CHF symptoms, no change in clinical condition. Will need periodic echocardiograms depending on symptoms.   LBBB new and now resolved and EF 45% new drop from 60% prior  latest EF 60% 2020  No cp  Sob on exertion not new per pat  Off  felcanide 50 bid  Abnormal nuclear stress test- poor image quaility mild ischemia probvaly artifact related - no cp- med RX  Cont lopressor 25 po bid    Limited echo revealed stable small pericardial effusion- resolved  Completed months of

## 2023-10-16 RX ORDER — SPIRONOLACTONE AND HYDROCHLOROTHIAZIDE 25; 25 MG/1; MG/1
0.5 TABLET ORAL DAILY
Qty: 45 TABLET | Refills: 2 | Status: SHIPPED | OUTPATIENT
Start: 2023-10-16

## 2023-12-27 ENCOUNTER — OFFICE VISIT (OUTPATIENT)
Dept: FAMILY MEDICINE CLINIC | Age: 73
End: 2023-12-27
Payer: MEDICARE

## 2023-12-27 VITALS
WEIGHT: 155 LBS | RESPIRATION RATE: 14 BRPM | DIASTOLIC BLOOD PRESSURE: 70 MMHG | SYSTOLIC BLOOD PRESSURE: 120 MMHG | BODY MASS INDEX: 26.61 KG/M2 | HEART RATE: 65 BPM | TEMPERATURE: 97.9 F

## 2023-12-27 DIAGNOSIS — S16.1XXA STRAIN OF NECK MUSCLE, INITIAL ENCOUNTER: Primary | ICD-10-CM

## 2023-12-27 PROCEDURE — 1123F ACP DISCUSS/DSCN MKR DOCD: CPT | Performed by: NURSE PRACTITIONER

## 2023-12-27 PROCEDURE — 99213 OFFICE O/P EST LOW 20 MIN: CPT | Performed by: NURSE PRACTITIONER

## 2023-12-27 PROCEDURE — 3017F COLORECTAL CA SCREEN DOC REV: CPT | Performed by: NURSE PRACTITIONER

## 2023-12-27 PROCEDURE — 1036F TOBACCO NON-USER: CPT | Performed by: NURSE PRACTITIONER

## 2023-12-27 PROCEDURE — G8417 CALC BMI ABV UP PARAM F/U: HCPCS | Performed by: NURSE PRACTITIONER

## 2023-12-27 PROCEDURE — G8399 PT W/DXA RESULTS DOCUMENT: HCPCS | Performed by: NURSE PRACTITIONER

## 2023-12-27 PROCEDURE — 1090F PRES/ABSN URINE INCON ASSESS: CPT | Performed by: NURSE PRACTITIONER

## 2023-12-27 PROCEDURE — G8427 DOCREV CUR MEDS BY ELIG CLIN: HCPCS | Performed by: NURSE PRACTITIONER

## 2023-12-27 PROCEDURE — G8484 FLU IMMUNIZE NO ADMIN: HCPCS | Performed by: NURSE PRACTITIONER

## 2023-12-27 RX ORDER — PREDNISONE 5 MG/1
TABLET ORAL
Qty: 45 TABLET | Refills: 0 | Status: SHIPPED | OUTPATIENT
Start: 2023-12-27 | End: 2024-01-06

## 2023-12-27 RX ORDER — TIZANIDINE 4 MG/1
4 TABLET ORAL EVERY 8 HOURS PRN
Qty: 30 TABLET | Refills: 0 | Status: SHIPPED | OUTPATIENT
Start: 2023-12-27

## 2024-01-10 RX ORDER — DILTIAZEM HYDROCHLORIDE 120 MG/1
CAPSULE, COATED, EXTENDED RELEASE ORAL
Qty: 90 CAPSULE | Refills: 2 | Status: SHIPPED | OUTPATIENT
Start: 2024-01-10

## 2024-01-16 RX ORDER — ATORVASTATIN CALCIUM 10 MG/1
10 TABLET, FILM COATED ORAL DAILY
Qty: 90 TABLET | Refills: 3 | Status: SHIPPED | OUTPATIENT
Start: 2024-01-16

## 2024-01-16 NOTE — TELEPHONE ENCOUNTER
Kimberlee Alvarado called requesting a refill on the following medications:  Requested Prescriptions     Pending Prescriptions Disp Refills    metoprolol tartrate (LOPRESSOR) 25 MG tablet 180 tablet 2     Sig: Take 1 tablet by mouth 2 times daily    spironolactone-hydroCHLOROthiazide (ALDACTAZIDE) 25-25 MG per tablet 45 tablet 2     Sig: Take 0.5 tablets by mouth daily     Pharmacy verified: [facility] in [place]  .pv    PATIENT IS CALLING IN NEEDING APPROVAL FOR EARLY REFILLS, SHE WILL BE TRAVELING FOR APPROX 6-8 WEEKS AND WANTS TO BE ABLE TO TAKE HER MEDS WITH HER SO SHE DOESN'T RUN OUT    Date of last visit: 10/11/2023  Date of next visit (if applicable): 4/11/2024

## 2024-01-17 RX ORDER — SPIRONOLACTONE AND HYDROCHLOROTHIAZIDE 25; 25 MG/1; MG/1
0.5 TABLET ORAL DAILY
Qty: 45 TABLET | Refills: 1 | Status: SHIPPED | OUTPATIENT
Start: 2024-01-17

## 2024-01-25 ENCOUNTER — HOSPITAL ENCOUNTER (OUTPATIENT)
Dept: CT IMAGING | Age: 74
Discharge: HOME | End: 2024-01-25
Payer: MEDICARE

## 2024-01-25 ENCOUNTER — OFFICE VISIT (OUTPATIENT)
Dept: FAMILY MEDICINE CLINIC | Age: 74
End: 2024-01-25

## 2024-01-25 VITALS
HEART RATE: 55 BPM | RESPIRATION RATE: 16 BRPM | SYSTOLIC BLOOD PRESSURE: 134 MMHG | OXYGEN SATURATION: 98 % | HEIGHT: 64 IN | DIASTOLIC BLOOD PRESSURE: 62 MMHG | WEIGHT: 154 LBS | BODY MASS INDEX: 26.29 KG/M2 | TEMPERATURE: 97.3 F

## 2024-01-25 DIAGNOSIS — C50.912 CARCINOMA OF LEFT BREAST METASTATIC TO AXILLARY LYMPH NODE (HCC): ICD-10-CM

## 2024-01-25 DIAGNOSIS — S16.1XXA STRAIN OF NECK MUSCLE, INITIAL ENCOUNTER: ICD-10-CM

## 2024-01-25 DIAGNOSIS — C77.3 CARCINOMA OF LEFT BREAST METASTATIC TO AXILLARY LYMPH NODE (HCC): ICD-10-CM

## 2024-01-25 DIAGNOSIS — I65.23 CALCIFICATION OF BOTH CAROTID ARTERIES: ICD-10-CM

## 2024-01-25 DIAGNOSIS — D68.69 SECONDARY HYPERCOAGULABLE STATE (HCC): ICD-10-CM

## 2024-01-25 DIAGNOSIS — M54.12 CERVICAL RADICULOPATHY: ICD-10-CM

## 2024-01-25 DIAGNOSIS — M54.12 CERVICAL RADICULOPATHY: Primary | ICD-10-CM

## 2024-01-25 DIAGNOSIS — I48.0 PAF (PAROXYSMAL ATRIAL FIBRILLATION) (HCC): ICD-10-CM

## 2024-01-25 DIAGNOSIS — I42.8 CARDIOMYOPATHY, NONISCHEMIC (HCC): ICD-10-CM

## 2024-01-25 PROCEDURE — 72125 CT NECK SPINE W/O DYE: CPT

## 2024-01-25 RX ORDER — PREDNISONE 5 MG/1
TABLET ORAL
Qty: 45 TABLET | Refills: 0 | Status: SHIPPED | OUTPATIENT
Start: 2024-01-25 | End: 2024-02-04

## 2024-01-25 ASSESSMENT — PATIENT HEALTH QUESTIONNAIRE - PHQ9
2. FEELING DOWN, DEPRESSED OR HOPELESS: 0
SUM OF ALL RESPONSES TO PHQ9 QUESTIONS 1 & 2: 0
SUM OF ALL RESPONSES TO PHQ QUESTIONS 1-9: 0
SUM OF ALL RESPONSES TO PHQ QUESTIONS 1-9: 0
1. LITTLE INTEREST OR PLEASURE IN DOING THINGS: 0
SUM OF ALL RESPONSES TO PHQ QUESTIONS 1-9: 0
SUM OF ALL RESPONSES TO PHQ QUESTIONS 1-9: 0

## 2024-01-25 ASSESSMENT — ENCOUNTER SYMPTOMS
EYES NEGATIVE: 1
RESPIRATORY NEGATIVE: 1
GASTROINTESTINAL NEGATIVE: 1

## 2024-01-25 NOTE — PROGRESS NOTES
prescribed medications.All patient questions answered.  Pt voiced understanding.  Patient agreed withtreatment plan. Follow up as directed.     Electronically signed by MARGARET Dorado CNP on 1/25/2024 at 12:15 PM

## 2024-03-14 ENCOUNTER — OFFICE VISIT (OUTPATIENT)
Dept: FAMILY MEDICINE CLINIC | Age: 74
End: 2024-03-14

## 2024-03-14 VITALS
TEMPERATURE: 97.5 F | DIASTOLIC BLOOD PRESSURE: 70 MMHG | RESPIRATION RATE: 15 BRPM | BODY MASS INDEX: 26.94 KG/M2 | HEART RATE: 64 BPM | WEIGHT: 157 LBS | SYSTOLIC BLOOD PRESSURE: 122 MMHG

## 2024-03-14 DIAGNOSIS — M50.30 DDD (DEGENERATIVE DISC DISEASE), CERVICAL: ICD-10-CM

## 2024-03-14 DIAGNOSIS — M54.12 CERVICAL RADICULOPATHY: Primary | ICD-10-CM

## 2024-03-14 RX ORDER — MELOXICAM 15 MG/1
15 TABLET ORAL DAILY
Qty: 30 TABLET | Refills: 1 | Status: SHIPPED | OUTPATIENT
Start: 2024-03-14 | End: 2025-03-14

## 2024-03-14 SDOH — ECONOMIC STABILITY: FOOD INSECURITY: WITHIN THE PAST 12 MONTHS, YOU WORRIED THAT YOUR FOOD WOULD RUN OUT BEFORE YOU GOT MONEY TO BUY MORE.: NEVER TRUE

## 2024-03-14 SDOH — ECONOMIC STABILITY: INCOME INSECURITY: HOW HARD IS IT FOR YOU TO PAY FOR THE VERY BASICS LIKE FOOD, HOUSING, MEDICAL CARE, AND HEATING?: NOT HARD AT ALL

## 2024-03-14 SDOH — ECONOMIC STABILITY: FOOD INSECURITY: WITHIN THE PAST 12 MONTHS, THE FOOD YOU BOUGHT JUST DIDN'T LAST AND YOU DIDN'T HAVE MONEY TO GET MORE.: NEVER TRUE

## 2024-03-14 ASSESSMENT — ENCOUNTER SYMPTOMS
RESPIRATORY NEGATIVE: 1
EYES NEGATIVE: 1
GASTROINTESTINAL NEGATIVE: 1

## 2024-03-14 NOTE — PROGRESS NOTES
Musculoskeletal:      Cervical back: Neck supple. Tenderness present. Pain with movement present. Decreased range of motion.   Lymphadenopathy:      Cervical: No cervical adenopathy.   Skin:     General: Skin is warm.   Neurological:      Mental Status: She is alert and oriented to person, place, and time.      Deep Tendon Reflexes: Reflexes are normal and symmetric.           :      Diagnosis Orders   1. Cervical radiculopathy  MRI CERVICAL SPINE WO CONTRAST    External Referral To Physical Therapy    meloxicam (MOBIC) 15 MG tablet      2. DDD (degenerative disc disease), cervical  MRI CERVICAL SPINE WO CONTRAST    External Referral To Physical Therapy    meloxicam (MOBIC) 15 MG tablet          :      No follow-ups on file.   Diagnosis Orders   1. Cervical radiculopathy  MRI CERVICAL SPINE WO CONTRAST    External Referral To Physical Therapy    meloxicam (MOBIC) 15 MG tablet      2. DDD (degenerative disc disease), cervical  MRI CERVICAL SPINE WO CONTRAST    External Referral To Physical Therapy    meloxicam (MOBIC) 15 MG tablet          Orders Placed This Encounter   Procedures    MRI CERVICAL SPINE WO CONTRAST     Standing Status:   Future     Standing Expiration Date:   3/14/2025    External Referral To Physical Therapy     Referral Priority:   Routine     Referral Type:   Eval and Treat     Referred to Provider:   [facility]     Requested Specialty:   Physical Therapist     Number of Visits Requested:   1     Orders Placed This Encounter   Medications    meloxicam (MOBIC) 15 MG tablet     Sig: Take 1 tablet by mouth daily     Dispense:  30 tablet     Refill:  1      Start mobid  Mri of neck  Refer to PT    Patient given educational materials - seepatient instructions.  Discussed use, benefit, and side effects of prescribed medications.All patient questions answered.  Pt voiced understanding.  Patient agreed withtreatment plan. Follow up as directed.     Electronically signed by Jim

## 2024-03-25 ENCOUNTER — HOSPITAL ENCOUNTER (OUTPATIENT)
Dept: MRI IMAGING | Age: 74
Discharge: HOME | End: 2024-03-25
Payer: MEDICARE

## 2024-03-25 DIAGNOSIS — M50.30 DDD (DEGENERATIVE DISC DISEASE), CERVICAL: ICD-10-CM

## 2024-03-25 DIAGNOSIS — M54.12 CERVICAL RADICULOPATHY: ICD-10-CM

## 2024-03-25 PROCEDURE — 72141 MRI NECK SPINE W/O DYE: CPT

## 2024-04-01 DIAGNOSIS — M50.30 DDD (DEGENERATIVE DISC DISEASE), CERVICAL: Primary | ICD-10-CM

## 2024-04-01 RX ORDER — TRAMADOL HYDROCHLORIDE 50 MG/1
50 TABLET ORAL EVERY 6 HOURS PRN
Qty: 20 TABLET | Refills: 0 | Status: SHIPPED | OUTPATIENT
Start: 2024-04-01 | End: 2024-04-06

## 2024-04-01 RX ORDER — BACLOFEN 10 MG/1
5 TABLET ORAL 3 TIMES DAILY
Qty: 15 TABLET | Refills: 0 | Status: SHIPPED | OUTPATIENT
Start: 2024-04-01

## 2024-04-01 NOTE — PROGRESS NOTES
Pt called with flare of neck pain.  Stopped zanaflex and switched to baclofen,  ultram called in and referred to pain mgtm

## 2024-04-11 ENCOUNTER — OFFICE VISIT (OUTPATIENT)
Dept: CARDIOLOGY CLINIC | Age: 74
End: 2024-04-11
Payer: MEDICARE

## 2024-04-11 VITALS
DIASTOLIC BLOOD PRESSURE: 78 MMHG | HEART RATE: 53 BPM | BODY MASS INDEX: 25.61 KG/M2 | HEIGHT: 64 IN | SYSTOLIC BLOOD PRESSURE: 130 MMHG | WEIGHT: 150 LBS

## 2024-04-11 DIAGNOSIS — R60.0 BILATERAL LEG EDEMA: ICD-10-CM

## 2024-04-11 DIAGNOSIS — R06.02 SOB (SHORTNESS OF BREATH) ON EXERTION: ICD-10-CM

## 2024-04-11 DIAGNOSIS — Z98.890 S/P CARDIAC CATH: ICD-10-CM

## 2024-04-11 DIAGNOSIS — I44.7 LBBB (LEFT BUNDLE BRANCH BLOCK): ICD-10-CM

## 2024-04-11 DIAGNOSIS — E78.5 DYSLIPIDEMIA: ICD-10-CM

## 2024-04-11 DIAGNOSIS — I42.8 CARDIOMYOPATHY, NONISCHEMIC (HCC): Primary | ICD-10-CM

## 2024-04-11 DIAGNOSIS — I48.0 PAF (PAROXYSMAL ATRIAL FIBRILLATION) (HCC): ICD-10-CM

## 2024-04-11 DIAGNOSIS — M50.30 DDD (DEGENERATIVE DISC DISEASE), CERVICAL: ICD-10-CM

## 2024-04-11 DIAGNOSIS — R94.31 ABNORMAL EKG: ICD-10-CM

## 2024-04-11 PROCEDURE — 1123F ACP DISCUSS/DSCN MKR DOCD: CPT | Performed by: INTERNAL MEDICINE

## 2024-04-11 PROCEDURE — 3017F COLORECTAL CA SCREEN DOC REV: CPT | Performed by: INTERNAL MEDICINE

## 2024-04-11 PROCEDURE — G8427 DOCREV CUR MEDS BY ELIG CLIN: HCPCS | Performed by: INTERNAL MEDICINE

## 2024-04-11 PROCEDURE — 1036F TOBACCO NON-USER: CPT | Performed by: INTERNAL MEDICINE

## 2024-04-11 PROCEDURE — G8417 CALC BMI ABV UP PARAM F/U: HCPCS | Performed by: INTERNAL MEDICINE

## 2024-04-11 PROCEDURE — G8399 PT W/DXA RESULTS DOCUMENT: HCPCS | Performed by: INTERNAL MEDICINE

## 2024-04-11 PROCEDURE — 99214 OFFICE O/P EST MOD 30 MIN: CPT | Performed by: INTERNAL MEDICINE

## 2024-04-11 PROCEDURE — 1090F PRES/ABSN URINE INCON ASSESS: CPT | Performed by: INTERNAL MEDICINE

## 2024-04-11 RX ORDER — MELOXICAM 15 MG/1
15 TABLET ORAL DAILY
COMMUNITY

## 2024-04-11 RX ORDER — TRAMADOL HYDROCHLORIDE 50 MG/1
50 TABLET ORAL EVERY 6 HOURS PRN
Qty: 20 TABLET | Refills: 0 | Status: SHIPPED | OUTPATIENT
Start: 2024-04-11 | End: 2024-04-16

## 2024-04-11 NOTE — PROGRESS NOTES
resolved  Completed months of colchicine    PAF 2015 and remain in NSR for over two yr- post chemo and radiation  chads of 0, chads vasc 2 ( age and female)  EKG NSR Normal QTC  Rate control for now and consider rhythm control down the line  ContSho Mercerm cd 120 qd   Remain in NSR for over two yr and will cont asa rather   OA considered and she does not want it now  Check event mionitor_ revealed NSR  Cont asa 325 po qd  Any recurrence of atr fib she need OA and to be readdressed  Pat verbalized understanding risk of CVA short of OA    Off  Flecanide 50 BID for drop in ef to 45    HOME BP ADVISED    Hx of Leg edema  +1  to +2 better now trace to +1 after start to take diuretics regularly  Cont  Aldactazide  1/2 po qd     Advised to  cont to take it daily     Hyperlipidemia: on statins, followed periodically. Patient need periodic lipid and liver profile.\    D/w the pat the plan of care    Echo and ekg reviewed and doing well    The pat is Overall stable and doing well     Need lab prior to next visit    Discussed use, benefit, and side effects of prescribed medications. All patient questions answered. Pt voiced understanding. Instructed to continue current medications, diet and exercise. Continue risk factor modification and medical management. Patient agreed with treatment plan. Follow up as directed.      The patient is advised to attempt to improve diet and exercise patterns to aid in medical management of this problem.  Advised more plant based nutrition/meditarrean diet   Advised patient to call office or seek immediate medical attention if there is any new onset of  any chest pain, sob, palpitations, lightheadedness, dizziness, orthopnea, PND or pedal edema.   All medication side effects were discussed in details.      RTC in 6 months    Vicente Tijerina MD,MD,FACC

## 2024-04-11 NOTE — TELEPHONE ENCOUNTER
Last visit- 3/14/2024  Next visit- Visit date not found    Requested Prescriptions     Pending Prescriptions Disp Refills    traMADol (ULTRAM) 50 MG tablet 20 tablet 0     Sig: Take 1 tablet by mouth every 6 hours as needed for Pain for up to 5 days. Intended supply: 5 days. Take lowest dose possible to manage pain Max Daily Amount: 200 mg

## 2024-04-16 ENCOUNTER — HOSPITAL ENCOUNTER (OUTPATIENT)
Age: 74
Discharge: HOME | End: 2024-04-16
Payer: MEDICARE

## 2024-04-16 DIAGNOSIS — E78.5 DYSLIPIDEMIA: ICD-10-CM

## 2024-04-16 DIAGNOSIS — R06.02 SOB (SHORTNESS OF BREATH) ON EXERTION: ICD-10-CM

## 2024-04-16 DIAGNOSIS — I42.8 CARDIOMYOPATHY, NONISCHEMIC (HCC): ICD-10-CM

## 2024-04-16 DIAGNOSIS — I44.7 LBBB (LEFT BUNDLE BRANCH BLOCK): ICD-10-CM

## 2024-04-16 DIAGNOSIS — R60.0 BILATERAL LEG EDEMA: ICD-10-CM

## 2024-04-16 DIAGNOSIS — Z98.890 S/P CARDIAC CATH: ICD-10-CM

## 2024-04-16 DIAGNOSIS — I48.0 PAF (PAROXYSMAL ATRIAL FIBRILLATION) (HCC): ICD-10-CM

## 2024-04-16 LAB
ALBUMIN SERPL BCG-MCNC: 3.9 G/DL (ref 3.5–5.1)
ALP SERPL-CCNC: 134 U/L (ref 38–126)
ALT SERPL W/O P-5'-P-CCNC: 13 U/L (ref 11–66)
ANION GAP SERPL CALC-SCNC: 12 MEQ/L (ref 8–16)
AST SERPL-CCNC: 17 U/L (ref 5–40)
BILIRUB CONJ SERPL-MCNC: < 0.2 MG/DL (ref 0–0.3)
BILIRUB SERPL-MCNC: 0.2 MG/DL (ref 0.3–1.2)
BUN SERPL-MCNC: 16 MG/DL (ref 7–22)
CALCIUM SERPL-MCNC: 9.5 MG/DL (ref 8.5–10.5)
CHLORIDE SERPL-SCNC: 96 MEQ/L (ref 98–111)
CHOLEST SERPL-MCNC: 154 MG/DL (ref 100–199)
CO2 SERPL-SCNC: 29 MEQ/L (ref 23–33)
CREAT SERPL-MCNC: 0.7 MG/DL (ref 0.4–1.2)
GFR SERPL CREATININE-BSD FRML MDRD: > 90 ML/MIN/1.73M2
GLUCOSE SERPL-MCNC: 98 MG/DL (ref 70–108)
HDLC SERPL-MCNC: 51 MG/DL
LDLC SERPL CALC-MCNC: 80 MG/DL
MAGNESIUM SERPL-MCNC: 1.7 MG/DL (ref 1.6–2.4)
POTASSIUM SERPL-SCNC: 4.1 MEQ/L (ref 3.5–5.2)
PROT SERPL-MCNC: 6.8 G/DL (ref 6.1–8)
SODIUM SERPL-SCNC: 137 MEQ/L (ref 135–145)
TRIGL SERPL-MCNC: 116 MG/DL (ref 0–199)

## 2024-04-16 PROCEDURE — 80053 COMPREHEN METABOLIC PANEL: CPT

## 2024-04-16 PROCEDURE — 83735 ASSAY OF MAGNESIUM: CPT

## 2024-04-16 PROCEDURE — 36415 COLL VENOUS BLD VENIPUNCTURE: CPT

## 2024-04-16 PROCEDURE — 82248 BILIRUBIN DIRECT: CPT

## 2024-04-16 PROCEDURE — 80061 LIPID PANEL: CPT

## 2024-04-30 ENCOUNTER — OFFICE VISIT (OUTPATIENT)
Dept: FAMILY MEDICINE CLINIC | Age: 74
End: 2024-04-30

## 2024-04-30 VITALS
TEMPERATURE: 98.3 F | OXYGEN SATURATION: 97 % | WEIGHT: 152.8 LBS | DIASTOLIC BLOOD PRESSURE: 70 MMHG | HEIGHT: 64 IN | RESPIRATION RATE: 16 BRPM | HEART RATE: 69 BPM | SYSTOLIC BLOOD PRESSURE: 124 MMHG | BODY MASS INDEX: 26.09 KG/M2

## 2024-04-30 DIAGNOSIS — M54.12 CERVICAL RADICULOPATHY: Primary | ICD-10-CM

## 2024-04-30 DIAGNOSIS — M43.6 TORTICOLLIS: ICD-10-CM

## 2024-04-30 DIAGNOSIS — M50.30 DDD (DEGENERATIVE DISC DISEASE), CERVICAL: ICD-10-CM

## 2024-04-30 RX ORDER — BACLOFEN 10 MG/1
5 TABLET ORAL 3 TIMES DAILY
Qty: 45 TABLET | Refills: 1 | Status: SHIPPED | OUTPATIENT
Start: 2024-04-30 | End: 2024-05-30

## 2024-04-30 RX ORDER — TRAMADOL HYDROCHLORIDE 50 MG/1
50 TABLET ORAL EVERY 6 HOURS PRN
Qty: 60 TABLET | Refills: 0 | Status: SHIPPED | OUTPATIENT
Start: 2024-04-30 | End: 2024-05-14

## 2024-04-30 ASSESSMENT — ENCOUNTER SYMPTOMS
EYES NEGATIVE: 1
RESPIRATORY NEGATIVE: 1
GASTROINTESTINAL NEGATIVE: 1

## 2024-04-30 NOTE — PROGRESS NOTES
Kimberlee Alvarado is a 73 y.o. female whopresents today for :  Chief Complaint   Patient presents with    Neck Pain     Vitals:    04/30/24 1310   BP: 124/70   Pulse: 69   Resp: 16   Temp: 98.3 °F (36.8 °C)   SpO2: 97%       HPI:     HPI  Patient here for follow-up of her neck pain.  She does report she is about 50 to 60% improved since starting her physical therapy.  She does have an appointment with pain management tomorrow her neck is very tight mainly on the right side.  She has pain that radiates up behind her right ear.  It is worse when she tries to lay down at night.  Reviewed her CT scans and MRI.  She reports the tramadol does help but she is out of it.  Also the baclofen was beneficial but she is out of it.  Reviewed her recent lab work.  Patient Active Problem List   Diagnosis    Breast cancer metastasized to axillary lymph node (HCC)    Encounter for antineoplastic chemotherapy    Anemia    Leukopenia    Encounter for care related to Port-a-Cath    Febrile illness, acute    Pneumonia    Pleural effusion    Paroxysmal A-fib (HCC)    Abnormal EKG    Elevated alkaline phosphatase level    Generalized weakness    Leukocytosis    Weakness    Use of anastrozole (Arimidex)    PAF (paroxysmal atrial fibrillation) (HCC)    Bilateral leg edema- dependant now +1  to +2 from +1    S/P cardiac cath- 7/18/17- LM-P, LAD MID AND DISTAL 30%, LCX SMALL PATENT , RCA LARGED PATENT, EDP 5 MMHG, EF 60%- MD RX    Breast cancer metastasized to axillary lymph node, left (HCC)    LBBB (left bundle branch block)    SOB (shortness of breath) on exertion    Cardiomyopathy, probable nonischemic (HCC)    Secondary hypercoagulable state (HCC)    Dyslipidemia     Past Medical History:   Diagnosis Date    Breast cancer (HCC)     2013 Left Invasive Ductal    Cancer (HCC) 2013    Breast LEFT    History of therapeutic radiation     completed 2014    Hx antineoplastic chemo     pre op chemo 2014      Past Surgical History:   Procedure

## 2024-05-01 ENCOUNTER — OFFICE VISIT (OUTPATIENT)
Dept: PHYSICAL MEDICINE AND REHAB | Age: 74
End: 2024-05-01
Payer: MEDICARE

## 2024-05-01 VITALS
DIASTOLIC BLOOD PRESSURE: 60 MMHG | BODY MASS INDEX: 25.95 KG/M2 | HEIGHT: 64 IN | SYSTOLIC BLOOD PRESSURE: 124 MMHG | WEIGHT: 152 LBS

## 2024-05-01 DIAGNOSIS — G24.3 CERVICAL DYSTONIA: ICD-10-CM

## 2024-05-01 DIAGNOSIS — M47.812 CERVICAL SPONDYLOSIS: ICD-10-CM

## 2024-05-01 DIAGNOSIS — M47.812 FACET HYPERTROPHY OF CERVICAL REGION: ICD-10-CM

## 2024-05-01 DIAGNOSIS — M79.18 MYOFASCIAL PAIN: Primary | ICD-10-CM

## 2024-05-01 DIAGNOSIS — G89.4 CHRONIC PAIN SYNDROME: ICD-10-CM

## 2024-05-01 PROCEDURE — G8399 PT W/DXA RESULTS DOCUMENT: HCPCS | Performed by: NURSE PRACTITIONER

## 2024-05-01 PROCEDURE — 1036F TOBACCO NON-USER: CPT | Performed by: NURSE PRACTITIONER

## 2024-05-01 PROCEDURE — 1123F ACP DISCUSS/DSCN MKR DOCD: CPT | Performed by: NURSE PRACTITIONER

## 2024-05-01 PROCEDURE — 3017F COLORECTAL CA SCREEN DOC REV: CPT | Performed by: NURSE PRACTITIONER

## 2024-05-01 PROCEDURE — G8427 DOCREV CUR MEDS BY ELIG CLIN: HCPCS | Performed by: NURSE PRACTITIONER

## 2024-05-01 PROCEDURE — 1090F PRES/ABSN URINE INCON ASSESS: CPT | Performed by: NURSE PRACTITIONER

## 2024-05-01 PROCEDURE — G8417 CALC BMI ABV UP PARAM F/U: HCPCS | Performed by: NURSE PRACTITIONER

## 2024-05-01 PROCEDURE — 20553 NJX 1/MLT TRIGGER POINTS 3/>: CPT | Performed by: NURSE PRACTITIONER

## 2024-05-01 PROCEDURE — 99205 OFFICE O/P NEW HI 60 MIN: CPT | Performed by: NURSE PRACTITIONER

## 2024-05-01 RX ORDER — METHOCARBAMOL 100 MG/ML
100 INJECTION, SOLUTION INTRAMUSCULAR; INTRAVENOUS ONCE
Status: COMPLETED | OUTPATIENT
Start: 2024-05-01 | End: 2024-05-01

## 2024-05-01 RX ADMIN — METHOCARBAMOL 100 MG: 100 INJECTION, SOLUTION INTRAMUSCULAR; INTRAVENOUS at 13:11

## 2024-05-01 NOTE — PROGRESS NOTES
Chronic Pain/PM&R Clinic Note     Encounter Date: 5/1/24    Subjective:   Chief Complaint:   Chief Complaint   Patient presents with    Neck Pain     New patient       History of Present Illness:   Kimberlee Alvarado is a 73 y.o. female seen in the clinic initially on 05/01/2024 upon request from MARGARET Dorado for her history of neck pain. Patient has a personal medical history of A-fib, cardiomyopathy, breast cancer, cardiac cath, dyslipidemia.    Patient presents today with complaints of bilateral neck pain that does radiate up into the back and top of her head, and to the right side of her head towards her ear.  She states she also notices it goes into the tops of her shoulders and around her shoulder blades.  She denies any type of headache pain, or arm pain.  She states that this pain has been occurring for about 5-6 months.  She states it has worsened in the last 6 weeks ago.  She is denies any falls, injury, accident that caused her pain.  She states she did fall asleep on her couch, and then she started to notice the pain started to increase after the few days.  She describes the pain as an intense, constant pain but can did not describe any more than that.  She states pain is worse at night, sleeping, turning her head, using her arm, washing her hair, driving.  She states she has just good and bad days.  She states taking her mind off the pain, ice helps some but nothing in particular helps her pain.  She states she has been doing formal physical therapy which she feels like is helping some along with dry needling they are doing there.  She states she feels like pain is getting a little bit better lately than it has been, but it still seems to be bothersome and interfere with her daily life.  She states that shedoes continue with her home exercises, and she has gotten a massage which seem to significantly give her relief for a few days but pain came back.  She states that pain will wake her up at

## 2024-05-01 NOTE — PROGRESS NOTES
Functionality Assessment/Goals Worksheet     On a scale of 0 (Does not Interfere) to 10 (Completely Interferes)     1.  Which number describes how during the past week pain has interfered with           the following:  A.  General Activity:  7  B.  Mood: 4  C.  Walking Ability:  0  D.  Normal Work (Includes both work outside the home and housework):  5  E.  Relations with Other People:   6  F.  Sleep:   10  G.  Enjoyment of Life:   7    2.  Patient Prefers to Take their Pain Medications:     []  On a regular basis   [x]  Only when necessary    []  Does not take pain medications    3.  What are the Patient's Goals/Expectations for Visiting Pain Management?     []  Learn about my pain    []  Receive Medication   []  Physical Therapy     []  Treat Depression   [x]  Receive Injections    []  Treat Sleep   []  Deal with Anxiety and Stress   []  Treat Opoid Dependence/Addiction   []  Other:                                 Adequate: hears normal conversation without difficulty

## 2024-05-29 ENCOUNTER — OFFICE VISIT (OUTPATIENT)
Dept: PHYSICAL MEDICINE AND REHAB | Age: 74
End: 2024-05-29
Payer: MEDICARE

## 2024-05-29 VITALS
DIASTOLIC BLOOD PRESSURE: 70 MMHG | SYSTOLIC BLOOD PRESSURE: 126 MMHG | HEIGHT: 64 IN | WEIGHT: 151.9 LBS | BODY MASS INDEX: 25.93 KG/M2

## 2024-05-29 DIAGNOSIS — G24.3 CERVICAL DYSTONIA: Primary | ICD-10-CM

## 2024-05-29 DIAGNOSIS — M47.812 CERVICAL SPONDYLOSIS: ICD-10-CM

## 2024-05-29 DIAGNOSIS — M79.18 MYOFASCIAL PAIN: ICD-10-CM

## 2024-05-29 DIAGNOSIS — M47.812 FACET HYPERTROPHY OF CERVICAL REGION: ICD-10-CM

## 2024-05-29 DIAGNOSIS — G89.4 CHRONIC PAIN SYNDROME: ICD-10-CM

## 2024-05-29 PROCEDURE — 3017F COLORECTAL CA SCREEN DOC REV: CPT | Performed by: NURSE PRACTITIONER

## 2024-05-29 PROCEDURE — 1036F TOBACCO NON-USER: CPT | Performed by: NURSE PRACTITIONER

## 2024-05-29 PROCEDURE — 99214 OFFICE O/P EST MOD 30 MIN: CPT | Performed by: NURSE PRACTITIONER

## 2024-05-29 PROCEDURE — G8427 DOCREV CUR MEDS BY ELIG CLIN: HCPCS | Performed by: NURSE PRACTITIONER

## 2024-05-29 PROCEDURE — 1123F ACP DISCUSS/DSCN MKR DOCD: CPT | Performed by: NURSE PRACTITIONER

## 2024-05-29 PROCEDURE — G8399 PT W/DXA RESULTS DOCUMENT: HCPCS | Performed by: NURSE PRACTITIONER

## 2024-05-29 PROCEDURE — 1090F PRES/ABSN URINE INCON ASSESS: CPT | Performed by: NURSE PRACTITIONER

## 2024-05-29 PROCEDURE — G8417 CALC BMI ABV UP PARAM F/U: HCPCS | Performed by: NURSE PRACTITIONER

## 2024-05-29 NOTE — PROGRESS NOTES
night, makes it difficult for her to get comfortable.  She states that she has been taking medications from her primary care provider which does help some, but again her pain continues to be severe enough to interfere with her daily life. Pain scale : at worst pain is 10/10, at best pain is 3/10.     Today, 5/29/2024, patient presents for planned follow-up.  She states trigger point injections completed at previous visit did not really do anything for the back of the head pain, but did seem to help her muscle pain in her neck.  She states she did feel like she was able to move her head better, and the tightness seems better.  She states she gets about 50% relief of the muscle pain, increased range of motion that continues today.  She states she has been noticing that the headache pain seems to be worse, as well as she feels like pain is minimal to get into her back where it is from her shoulder blade area.  She reports she continues to work with therapy with some relief, and tries to continue doing her home exercises well.  She states she has been using the tramadol minimally, takes only when pain is really severe.  She states she does feel that she has not been sleeping well and pain has been worse at night for her.  She states has been taking the meloxicam as ordered with relief, and also uses baclofen 3 times daily as ordered with some relief.  She states she continues to feel miserable, and she is not sleeping well, which also in interferes with her daily activities.        History of Interventions:   Surgery: No previous cervical surgeries  Injections: TPI-50% relief    Physical therapy:  Yes - 3/2024 -current (PT services)    Current Treatment Medications:   Baclofen-relief  Tramadol-relief  Mobic - relief   Tylenol    Historical Treatment Medications:   Zanaflex  Motrin      Imaging:  PROCEDURE: CT CERVICAL SPINE WO CONTRAST 1/25/2024     CLINICAL INFORMATION: Cervical radiculopathy.     COMPARISON: No prior

## 2024-06-06 ENCOUNTER — OFFICE VISIT (OUTPATIENT)
Dept: PHYSICAL MEDICINE AND REHAB | Age: 74
End: 2024-06-06
Payer: MEDICARE

## 2024-06-06 VITALS
DIASTOLIC BLOOD PRESSURE: 54 MMHG | HEIGHT: 64 IN | WEIGHT: 151 LBS | SYSTOLIC BLOOD PRESSURE: 112 MMHG | BODY MASS INDEX: 25.78 KG/M2

## 2024-06-06 DIAGNOSIS — M54.81 OCCIPITAL NEURALGIA OF RIGHT SIDE: Primary | ICD-10-CM

## 2024-06-06 PROCEDURE — 64405 NJX AA&/STRD GR OCPL NRV: CPT | Performed by: ANESTHESIOLOGY

## 2024-06-06 RX ORDER — TRIAMCINOLONE ACETONIDE 40 MG/ML
40 INJECTION, SUSPENSION INTRA-ARTICULAR; INTRAMUSCULAR ONCE
Status: COMPLETED | OUTPATIENT
Start: 2024-06-06 | End: 2024-06-06

## 2024-06-06 RX ADMIN — TRIAMCINOLONE ACETONIDE 40 MG: 40 INJECTION, SUSPENSION INTRA-ARTICULAR; INTRAMUSCULAR at 15:20

## 2024-06-06 NOTE — PROGRESS NOTES
Pre-operative Diagnosis:  RIGHT occipital neuralgia      Post-operative Diagnosis: RIGHT occipital neuralgia      Procedure: RIGHT occipital nerve blocks     Procedure Description:  After having signed the informed consent, the patient was seated in a chair. The patient's posterior occiput region was prepped with alcohol wipes.   The occipital protuberance and mastoid processes were palpated.  Moving inferior lateral one third this distance from the occipital protuberance to the right, the right occipital nerve was blocked using a 25-gauge 1.5 inch needle inserted directly through skin to lie over the bone medial to the occipital artery.  After negative aspiration, 40 mg of Kenalog mixed with 2 cc of 0.5% bupivacaine were injected.  The needle was removed without any complications.  The patient tolerated the procedure well.     Procedural Complications: None        Jose Carter DO  Interventional Pain Management/PM&R   [facility] [place]

## 2024-06-06 NOTE — PROGRESS NOTES
Functionality Assessment/Goals Worksheet     On a scale of 0 (Does not Interfere) to 10 (Completely Interferes)     1.  Which number describes how during the past week pain has interfered with           the following:  A.  General Activity:  8  B.  Mood: 7  C.  Walking Ability:  9  D.  Normal Work (Includes both work outside the home and housework):  9  E.  Relations with Other People:   6  F.  Sleep:   8  G.  Enjoyment of Life:   7    2.  Patient Prefers to Take their Pain Medications:     []  On a regular basis   [x]  Only when necessary    []  Does not take pain medications    3.  What are the Patient's Goals/Expectations for Visiting Pain Management?     []  Learn about my pain    []  Receive Medication   []  Physical Therapy     []  Treat Depression   [x]  Receive Injections    []  Treat Sleep   []  Deal with Anxiety and Stress   []  Treat Opoid Dependence/Addiction   []  Other:

## 2024-06-10 ENCOUNTER — HOSPITAL ENCOUNTER (OUTPATIENT)
Dept: WOMENS IMAGING | Age: 74
Discharge: HOME | End: 2024-06-10
Attending: INTERNAL MEDICINE
Payer: MEDICARE

## 2024-06-10 DIAGNOSIS — Z12.31 SCREENING MAMMOGRAM, ENCOUNTER FOR: ICD-10-CM

## 2024-06-10 PROCEDURE — 77063 BREAST TOMOSYNTHESIS BI: CPT

## 2024-06-11 ENCOUNTER — HOSPITAL ENCOUNTER (OUTPATIENT)
Age: 74
Discharge: HOME | End: 2024-06-11
Payer: MEDICARE

## 2024-06-11 LAB
ALBUMIN SERPL BCP-MCNC: 3.2 GM/DL (ref 3.4–5)
ALP SERPL-CCNC: 160 U/L (ref 46–116)
ALT SERPL W P-5'-P-CCNC: 33 U/L (ref 14–63)
ANION GAP SERPL CALC-SCNC: 9 MEQ/L (ref 8–16)
AST SERPL W P-5'-P-CCNC: 22 U/L (ref 15–37)
BASOPHILS # BLD: 0.5 % (ref 0–3)
BASOPHILS ABSOLUTE: 0.1 THOU/MM3 (ref 0–0.1)
BILIRUB SERPL-MCNC: 0.2 MG/DL (ref 0.2–1)
BUN SERPL-MCNC: 28 MG/DL (ref 7–18)
CALCIUM SERPL-MCNC: 9.4 MG/DL (ref 8.5–10.1)
CHLORIDE SERPL-SCNC: 103 MEQ/L (ref 98–107)
CO2 SERPL-SCNC: 30 MEQ/L (ref 21–32)
CREAT SERPL-MCNC: 1.6 MG/DL (ref 0.6–1.3)
EOSINOPHILS ABSOLUTE: 0.3 THOU/MM3 (ref 0–0.5)
EOSINOPHILS RELATIVE PERCENT: 2.5 % (ref 0–4)
GFR SERPL CREATININE-BSD FRML MDRD: 34 ML/MIN/1.73M2
GLUCOSE SERPL-MCNC: 138 MG/DL (ref 74–106)
HCT VFR BLD CALC: 32.2 % (ref 37–47)
HEMOGLOBIN: 10 GM/DL (ref 12–16)
IMMATURE GRANS (ABS): 0 THOU/MM3 (ref 0–0.07)
IMMATURE GRANULOCYTES %: 0 %
LYMPHOCYTES # BLD AUTO: 19.1 % (ref 15–47)
LYMPHOCYTES ABSOLUTE: 2.5 THOU/MM3 (ref 1–4.8)
MCH RBC QN AUTO: 29.4 PG (ref 26–32)
MCHC RBC AUTO-ENTMCNC: 31.1 GM/DL (ref 31–35)
MCV RBC AUTO: 94.7 FL (ref 81–99)
MONOCYTES: 1 THOU/MM3 (ref 0.3–1.3)
MONOCYTES: 7.5 % (ref 0–12)
NEUTROPHILS ABSOLUTE: 9.1 THOU/MM3 (ref 1.8–7.7)
PDW BLD-RTO: 14.6 % (ref 11.5–14.9)
PLATELET # BLD AUTO: 320 THOU/MM3 (ref 130–400)
PMV BLD AUTO: 9.3 FL (ref 9.4–12.4)
POTASSIUM SERPL-SCNC: 4.8 MEQ/L (ref 3.5–5.1)
PROT SERPL-MCNC: 6.8 GM/DL (ref 6.4–8.2)
RBC # BLD: 3.4 MILL/MM3 (ref 4.1–5.3)
SEG NEUTROPHILS: 70.2 % (ref 43–75)
SODIUM SERPL-SCNC: 142 MEQ/L (ref 136–145)
WBC # BLD: 13 THOU/MM3 (ref 4.8–10.8)

## 2024-06-11 PROCEDURE — 86300 IMMUNOASSAY TUMOR CA 15-3: CPT

## 2024-06-11 PROCEDURE — 85025 COMPLETE CBC W/AUTO DIFF WBC: CPT

## 2024-06-11 PROCEDURE — 80053 COMPREHEN METABOLIC PANEL: CPT

## 2024-06-11 PROCEDURE — 36415 COLL VENOUS BLD VENIPUNCTURE: CPT

## 2024-06-14 LAB — CANCER AG27-29 SERPL-ACNC: 147 U/ML (ref 0–38)

## 2024-07-12 ENCOUNTER — HOSPITAL ENCOUNTER (INPATIENT)
Age: 74
LOS: 6 days | Discharge: NURSING, REHAB OR LONG-TERM CARE | End: 2024-07-18
Attending: EMERGENCY MEDICINE
Payer: MEDICARE

## 2024-07-12 ENCOUNTER — APPOINTMENT (OUTPATIENT)
Dept: CT IMAGING | Age: 74
End: 2024-07-12
Attending: EMERGENCY MEDICINE
Payer: MEDICARE

## 2024-07-12 ENCOUNTER — APPOINTMENT (OUTPATIENT)
Dept: GENERAL RADIOLOGY | Age: 74
End: 2024-07-12
Attending: EMERGENCY MEDICINE
Payer: MEDICARE

## 2024-07-12 DIAGNOSIS — R18.8 OTHER ASCITES: ICD-10-CM

## 2024-07-12 DIAGNOSIS — D64.9 ANEMIA, UNSPECIFIED TYPE: ICD-10-CM

## 2024-07-12 DIAGNOSIS — I50.9 CHRONIC CONGESTIVE HEART FAILURE, UNSPECIFIED HEART FAILURE TYPE (HCC): ICD-10-CM

## 2024-07-12 DIAGNOSIS — R79.89 ELEVATED LACTIC ACID LEVEL: ICD-10-CM

## 2024-07-12 DIAGNOSIS — J18.9 PNEUMONIA OF UPPER LOBE DUE TO INFECTIOUS ORGANISM, UNSPECIFIED LATERALITY: Primary | ICD-10-CM

## 2024-07-12 DIAGNOSIS — I48.91 ATRIAL FIBRILLATION WITH RAPID VENTRICULAR RESPONSE (HCC): ICD-10-CM

## 2024-07-12 PROBLEM — R11.2 NAUSEA & VOMITING: Status: ACTIVE | Noted: 2024-07-12

## 2024-07-12 LAB
AMORPH SED URNS QL MICRO: NORMAL
ANION GAP SERPL CALC-SCNC: 11 MEQ/L (ref 8–16)
BACTERIA URNS QL MICRO: NORMAL
BASE EXCESS BLDA CALC-SCNC: 4.2 MMOL/L (ref -2–3)
BASOPHILS # BLD: 0 % (ref 0–3)
BASOPHILS ABSOLUTE: 0 THOU/MM3 (ref 0–0.1)
BILIRUB UR QL STRIP.AUTO: NEGATIVE
BUN SERPL-MCNC: 44 MG/DL (ref 7–18)
CALCIUM SERPL-MCNC: 9.7 MG/DL (ref 8.5–10.1)
CASTS #/AREA URNS LPF: NORMAL /LPF
CHARACTER UR: CLEAR
CHLORIDE SERPL-SCNC: 93 MEQ/L (ref 98–107)
CO2 SERPL-SCNC: 27 MEQ/L (ref 21–32)
COLLECTED BY:: ABNORMAL
COLOR UR AUTO: YELLOW
CREAT SERPL-MCNC: 1.3 MG/DL (ref 0.6–1.3)
CRYSTALS VITF MICRO: NORMAL
DEVICE: ABNORMAL
EKG Q-T INTERVAL: 380 MS
EKG QRS DURATION: 124 MS
EKG QTC CALCULATION (BAZETT): 511 MS
EKG R AXIS: -44 DEGREES
EKG T AXIS: 94 DEGREES
EKG VENTRICULAR RATE: 109 BPM
EOSINOPHILS ABSOLUTE: 0 THOU/MM3 (ref 0–0.5)
EOSINOPHILS RELATIVE PERCENT: 0.2 % (ref 0–4)
EPI CELLS #/AREA URNS HPF: NORMAL /HPF
GFR SERPL CREATININE-BSD FRML MDRD: 43 ML/MIN/1.73M2
GLUCOSE SERPL-MCNC: 135 MG/DL (ref 74–106)
GLUCOSE UR QL STRIP.AUTO: NEGATIVE MG/DL
HCO3 BLDA-SCNC: 28 MMOL/L (ref 23–28)
HCT VFR BLD AUTO: 31.6 % (ref 37–47)
HCT VFR BLD CALC: 31.1 % (ref 37–47)
HEMOGLOBIN: 10.4 GM/DL (ref 12–16)
HGB BLD-MCNC: 10.4 GM/DL (ref 12–16)
HGB UR STRIP.AUTO-MCNC: NEGATIVE MG/L
IMMATURE GRANS (ABS): 0.24 THOU/MM3 (ref 0–0.07)
IMMATURE GRANULOCYTES %: 1 %
INFLUENZA A BY PCR: NOT DETECTED
INFLUENZA B BY PCR: NOT DETECTED
KETONES UR QL STRIP.AUTO: NEGATIVE
LACTATE SERPL-SCNC: 1.2 MMOL/L (ref 0.5–2)
LACTATE: 2 MMOL/L (ref 0.9–1.7)
LEUKOCYTE ESTERASE UR QL STRIP.AUTO: NEGATIVE
LIPASE SERPL-CCNC: 68 U/L (ref 16–77)
LYMPHOCYTES # BLD AUTO: 10.5 % (ref 15–47)
LYMPHOCYTES ABSOLUTE: 2.5 THOU/MM3 (ref 1–4.8)
MAGNESIUM SERPL-MCNC: 1.9 MG/DL (ref 1.8–2.4)
MCH RBC QN AUTO: 29.8 PG (ref 26–32)
MCHC RBC AUTO-ENTMCNC: 33.4 GM/DL (ref 31–35)
MCV RBC AUTO: 89.1 FL (ref 81–99)
MONOCYTES: 1.8 THOU/MM3 (ref 0.3–1.3)
MONOCYTES: 7.5 % (ref 0–12)
MUCOUS THREADS URNS QL MICRO: NORMAL
NEUTROPHILS ABSOLUTE: 19.2 THOU/MM3 (ref 1.8–7.7)
NITRITE UR QL STRIP.AUTO: NEGATIVE
NT PRO BNP: 4733 PG/ML (ref 0–900)
PCO2 TEMP ADJ BLDMV: 36 MMHG (ref 41–51)
PDW BLD-RTO: 14.3 % (ref 11.5–14.9)
PH BLDMV: 7.49 [PH] (ref 7.31–7.41)
PH UR STRIP.AUTO: 5.5 [PH] (ref 5–9)
PLATELET # BLD AUTO: 321 THOU/MM3 (ref 130–400)
PMV BLD AUTO: 9.8 FL (ref 9.4–12.4)
PO2 BLDMV: 63 MMHG (ref 25–40)
POTASSIUM SERPL-SCNC: 4.1 MEQ/L (ref 3.5–5.1)
PROT UR STRIP.AUTO-MCNC: NEGATIVE MG/DL
RBC # BLD: 3.49 MILL/MM3 (ref 4.1–5.3)
RBC #/AREA URNS HPF: NORMAL /HPF
REFLEX TO URINE C & S: NORMAL
SAO2 % BLDMV: 94 %
SARS-COV-2 RNA, RT PCR: NOT DETECTED
SEG NEUTROPHILS: 81 % (ref 43–75)
SITE: ABNORMAL
SODIUM SERPL-SCNC: 131 MEQ/L (ref 136–145)
SP GR UR STRIP.AUTO: 1.02 (ref 1–1.03)
TROPONIN, HIGH SENSITIVITY: 29.4 PG/ML (ref 0–51.3)
UROBILINOGEN UR STRIP-ACNC: 0.2 EU/DL (ref 0–1)
VENTILATION MODE VENT: ABNORMAL
WBC # BLD: 23.7 THOU/MM3 (ref 4.8–10.8)
WBC # UR STRIP.AUTO: NORMAL /HPF

## 2024-07-12 PROCEDURE — 6360000004 HC RX CONTRAST MEDICATION: Performed by: EMERGENCY MEDICINE

## 2024-07-12 PROCEDURE — 36415 COLL VENOUS BLD VENIPUNCTURE: CPT

## 2024-07-12 PROCEDURE — 81001 URINALYSIS AUTO W/SCOPE: CPT

## 2024-07-12 PROCEDURE — 82977 ASSAY OF GGT: CPT

## 2024-07-12 PROCEDURE — 93010 ELECTROCARDIOGRAM REPORT: CPT | Performed by: INTERNAL MEDICINE

## 2024-07-12 PROCEDURE — 80048 BASIC METABOLIC PNL TOTAL CA: CPT

## 2024-07-12 PROCEDURE — 74176 CT ABD & PELVIS W/O CONTRAST: CPT

## 2024-07-12 PROCEDURE — 85018 HEMOGLOBIN: CPT

## 2024-07-12 PROCEDURE — 87040 BLOOD CULTURE FOR BACTERIA: CPT

## 2024-07-12 PROCEDURE — 74177 CT ABD & PELVIS W/CONTRAST: CPT

## 2024-07-12 PROCEDURE — 2580000003 HC RX 258: Performed by: EMERGENCY MEDICINE

## 2024-07-12 PROCEDURE — 83735 ASSAY OF MAGNESIUM: CPT

## 2024-07-12 PROCEDURE — 83690 ASSAY OF LIPASE: CPT

## 2024-07-12 PROCEDURE — 93005 ELECTROCARDIOGRAM TRACING: CPT | Performed by: EMERGENCY MEDICINE

## 2024-07-12 PROCEDURE — 6370000000 HC RX 637 (ALT 250 FOR IP): Performed by: EMERGENCY MEDICINE

## 2024-07-12 PROCEDURE — 99223 1ST HOSP IP/OBS HIGH 75: CPT | Performed by: INTERNAL MEDICINE

## 2024-07-12 PROCEDURE — 6360000002 HC RX W HCPCS: Performed by: PHYSICIAN ASSISTANT

## 2024-07-12 PROCEDURE — 84443 ASSAY THYROID STIM HORMONE: CPT

## 2024-07-12 PROCEDURE — 83880 ASSAY OF NATRIURETIC PEPTIDE: CPT

## 2024-07-12 PROCEDURE — 82803 BLOOD GASES ANY COMBINATION: CPT

## 2024-07-12 PROCEDURE — 87636 SARSCOV2 & INF A&B AMP PRB: CPT

## 2024-07-12 PROCEDURE — 71046 X-RAY EXAM CHEST 2 VIEWS: CPT

## 2024-07-12 PROCEDURE — 1200000003 HC TELEMETRY R&B

## 2024-07-12 PROCEDURE — 83605 ASSAY OF LACTIC ACID: CPT

## 2024-07-12 PROCEDURE — 96365 THER/PROPH/DIAG IV INF INIT: CPT

## 2024-07-12 PROCEDURE — 84145 PROCALCITONIN (PCT): CPT

## 2024-07-12 PROCEDURE — 85025 COMPLETE CBC W/AUTO DIFF WBC: CPT

## 2024-07-12 PROCEDURE — 85014 HEMATOCRIT: CPT

## 2024-07-12 PROCEDURE — 6360000002 HC RX W HCPCS: Performed by: EMERGENCY MEDICINE

## 2024-07-12 PROCEDURE — 2580000003 HC RX 258

## 2024-07-12 PROCEDURE — 99285 EMERGENCY DEPT VISIT HI MDM: CPT

## 2024-07-12 PROCEDURE — 84484 ASSAY OF TROPONIN QUANT: CPT

## 2024-07-12 PROCEDURE — 6360000002 HC RX W HCPCS

## 2024-07-12 PROCEDURE — 71275 CT ANGIOGRAPHY CHEST: CPT

## 2024-07-12 RX ORDER — MAGNESIUM SULFATE IN WATER 40 MG/ML
2000 INJECTION, SOLUTION INTRAVENOUS PRN
Status: DISCONTINUED | OUTPATIENT
Start: 2024-07-12 | End: 2024-07-18 | Stop reason: HOSPADM

## 2024-07-12 RX ORDER — OXYCODONE HYDROCHLORIDE 5 MG/1
5 TABLET ORAL EVERY 6 HOURS PRN
Status: DISCONTINUED | OUTPATIENT
Start: 2024-07-12 | End: 2024-07-18 | Stop reason: HOSPADM

## 2024-07-12 RX ORDER — PROMETHAZINE HYDROCHLORIDE 25 MG/1
12.5 TABLET ORAL EVERY 6 HOURS PRN
Status: DISCONTINUED | OUTPATIENT
Start: 2024-07-12 | End: 2024-07-12

## 2024-07-12 RX ORDER — HYDROCHLOROTHIAZIDE 12.5 MG/1
12.5 CAPSULE, GELATIN COATED ORAL DAILY
Status: DISCONTINUED | OUTPATIENT
Start: 2024-07-13 | End: 2024-07-18 | Stop reason: HOSPADM

## 2024-07-12 RX ORDER — METOPROLOL TARTRATE 1 MG/ML
2.5 INJECTION, SOLUTION INTRAVENOUS EVERY 6 HOURS
Status: DISCONTINUED | OUTPATIENT
Start: 2024-07-12 | End: 2024-07-13

## 2024-07-12 RX ORDER — SPIRONOLACTONE AND HYDROCHLOROTHIAZIDE 25; 25 MG/1; MG/1
0.5 TABLET ORAL DAILY
Status: DISCONTINUED | OUTPATIENT
Start: 2024-07-12 | End: 2024-07-12 | Stop reason: CLARIF

## 2024-07-12 RX ORDER — OXYCODONE HYDROCHLORIDE 5 MG/1
5 CAPSULE ORAL EVERY 6 HOURS PRN
COMMUNITY
End: 2024-07-18 | Stop reason: SDUPTHER

## 2024-07-12 RX ORDER — SODIUM CHLORIDE 0.9 % (FLUSH) 0.9 %
5-40 SYRINGE (ML) INJECTION PRN
Status: DISCONTINUED | OUTPATIENT
Start: 2024-07-12 | End: 2024-07-18 | Stop reason: HOSPADM

## 2024-07-12 RX ORDER — SPIRONOLACTONE 25 MG/1
12.5 TABLET ORAL DAILY
Status: DISCONTINUED | OUTPATIENT
Start: 2024-07-13 | End: 2024-07-18 | Stop reason: HOSPADM

## 2024-07-12 RX ORDER — ASPIRIN 325 MG
325 TABLET, DELAYED RELEASE (ENTERIC COATED) ORAL DAILY
Status: DISCONTINUED | OUTPATIENT
Start: 2024-07-12 | End: 2024-07-18 | Stop reason: HOSPADM

## 2024-07-12 RX ORDER — PANTOPRAZOLE SODIUM 40 MG/10ML
40 INJECTION, POWDER, LYOPHILIZED, FOR SOLUTION INTRAVENOUS
Status: DISCONTINUED | OUTPATIENT
Start: 2024-07-13 | End: 2024-07-13

## 2024-07-12 RX ORDER — ONDANSETRON 4 MG/1
4 TABLET, ORALLY DISINTEGRATING ORAL ONCE
Status: COMPLETED | OUTPATIENT
Start: 2024-07-12 | End: 2024-07-12

## 2024-07-12 RX ORDER — POTASSIUM CHLORIDE 7.45 MG/ML
10 INJECTION INTRAVENOUS PRN
Status: DISCONTINUED | OUTPATIENT
Start: 2024-07-12 | End: 2024-07-18 | Stop reason: HOSPADM

## 2024-07-12 RX ORDER — SODIUM CHLORIDE 9 MG/ML
INJECTION, SOLUTION INTRAVENOUS PRN
Status: DISCONTINUED | OUTPATIENT
Start: 2024-07-12 | End: 2024-07-18 | Stop reason: HOSPADM

## 2024-07-12 RX ORDER — DILTIAZEM HYDROCHLORIDE 120 MG/1
120 CAPSULE, COATED, EXTENDED RELEASE ORAL DAILY
Status: DISCONTINUED | OUTPATIENT
Start: 2024-07-12 | End: 2024-07-18 | Stop reason: HOSPADM

## 2024-07-12 RX ORDER — ACETAMINOPHEN 325 MG/1
650 TABLET ORAL EVERY 6 HOURS PRN
Status: DISCONTINUED | OUTPATIENT
Start: 2024-07-12 | End: 2024-07-18 | Stop reason: HOSPADM

## 2024-07-12 RX ORDER — SODIUM CHLORIDE 0.9 % (FLUSH) 0.9 %
5-40 SYRINGE (ML) INJECTION EVERY 12 HOURS SCHEDULED
Status: DISCONTINUED | OUTPATIENT
Start: 2024-07-12 | End: 2024-07-18 | Stop reason: HOSPADM

## 2024-07-12 RX ORDER — PROMETHAZINE HYDROCHLORIDE 25 MG/ML
12.5 INJECTION, SOLUTION INTRAMUSCULAR; INTRAVENOUS EVERY 6 HOURS PRN
Status: DISCONTINUED | OUTPATIENT
Start: 2024-07-12 | End: 2024-07-15

## 2024-07-12 RX ORDER — POTASSIUM CHLORIDE 20 MEQ/1
40 TABLET, EXTENDED RELEASE ORAL PRN
Status: DISCONTINUED | OUTPATIENT
Start: 2024-07-12 | End: 2024-07-18 | Stop reason: HOSPADM

## 2024-07-12 RX ORDER — ENOXAPARIN SODIUM 100 MG/ML
1 INJECTION SUBCUTANEOUS ONCE
Status: COMPLETED | OUTPATIENT
Start: 2024-07-12 | End: 2024-07-12

## 2024-07-12 RX ORDER — SODIUM CHLORIDE 9 MG/ML
INJECTION, SOLUTION INTRAVENOUS CONTINUOUS
Status: DISCONTINUED | OUTPATIENT
Start: 2024-07-12 | End: 2024-07-14

## 2024-07-12 RX ORDER — ATORVASTATIN CALCIUM 10 MG/1
10 TABLET, FILM COATED ORAL DAILY
Status: DISCONTINUED | OUTPATIENT
Start: 2024-07-12 | End: 2024-07-18 | Stop reason: HOSPADM

## 2024-07-12 RX ORDER — 0.9 % SODIUM CHLORIDE 0.9 %
1000 INTRAVENOUS SOLUTION INTRAVENOUS ONCE
Status: COMPLETED | OUTPATIENT
Start: 2024-07-12 | End: 2024-07-12

## 2024-07-12 RX ORDER — POLYETHYLENE GLYCOL 3350 17 G/17G
17 POWDER, FOR SOLUTION ORAL DAILY PRN
Status: DISCONTINUED | OUTPATIENT
Start: 2024-07-12 | End: 2024-07-13

## 2024-07-12 RX ORDER — ACETAMINOPHEN 650 MG/1
650 SUPPOSITORY RECTAL EVERY 6 HOURS PRN
Status: DISCONTINUED | OUTPATIENT
Start: 2024-07-12 | End: 2024-07-18 | Stop reason: HOSPADM

## 2024-07-12 RX ORDER — MAGNESIUM SULFATE 1 G/100ML
1000 INJECTION INTRAVENOUS ONCE
Status: COMPLETED | OUTPATIENT
Start: 2024-07-12 | End: 2024-07-12

## 2024-07-12 RX ADMIN — ONDANSETRON 4 MG: 4 TABLET, ORALLY DISINTEGRATING ORAL at 15:52

## 2024-07-12 RX ADMIN — ONDANSETRON 4 MG: 4 TABLET, ORALLY DISINTEGRATING ORAL at 10:56

## 2024-07-12 RX ADMIN — IOPAMIDOL 100 ML: 755 INJECTION, SOLUTION INTRAVENOUS at 13:28

## 2024-07-12 RX ADMIN — SODIUM CHLORIDE, PRESERVATIVE FREE 10 ML: 5 INJECTION INTRAVENOUS at 21:43

## 2024-07-12 RX ADMIN — ENOXAPARIN SODIUM 60 MG: 100 INJECTION SUBCUTANEOUS at 14:55

## 2024-07-12 RX ADMIN — ONDANSETRON 4 MG: 4 TABLET, ORALLY DISINTEGRATING ORAL at 12:51

## 2024-07-12 RX ADMIN — PROMETHAZINE HYDROCHLORIDE 12.5 MG: 25 INJECTION INTRAMUSCULAR; INTRAVENOUS at 21:43

## 2024-07-12 RX ADMIN — SODIUM CHLORIDE: 9 INJECTION, SOLUTION INTRAVENOUS at 21:44

## 2024-07-12 RX ADMIN — SODIUM CHLORIDE 1000 ML: 9 INJECTION, SOLUTION INTRAVENOUS at 10:56

## 2024-07-12 RX ADMIN — PIPERACILLIN AND TAZOBACTAM 4500 MG: 4; .5 INJECTION, POWDER, LYOPHILIZED, FOR SOLUTION INTRAVENOUS at 15:15

## 2024-07-12 RX ADMIN — MAGNESIUM SULFATE HEPTAHYDRATE 1000 MG: 1 INJECTION, SOLUTION INTRAVENOUS at 10:58

## 2024-07-12 RX ADMIN — PIPERACILLIN AND TAZOBACTAM 3375 MG: 3; .375 INJECTION, POWDER, FOR SOLUTION INTRAVENOUS at 22:48

## 2024-07-12 ASSESSMENT — PAIN DESCRIPTION - DESCRIPTORS: DESCRIPTORS: ACHING

## 2024-07-12 ASSESSMENT — PAIN DESCRIPTION - ONSET: ONSET: ON-GOING

## 2024-07-12 ASSESSMENT — PAIN DESCRIPTION - FREQUENCY: FREQUENCY: CONTINUOUS

## 2024-07-12 ASSESSMENT — PAIN DESCRIPTION - LOCATION
LOCATION: HEAD;ABDOMEN
LOCATION: HEAD

## 2024-07-12 ASSESSMENT — PAIN - FUNCTIONAL ASSESSMENT: PAIN_FUNCTIONAL_ASSESSMENT: ACTIVITIES ARE NOT PREVENTED

## 2024-07-12 ASSESSMENT — PAIN DESCRIPTION - PAIN TYPE: TYPE: ACUTE PAIN

## 2024-07-12 ASSESSMENT — PAIN DESCRIPTION - ORIENTATION: ORIENTATION: MID

## 2024-07-12 ASSESSMENT — PAIN SCALES - GENERAL
PAINLEVEL_OUTOF10: 3
PAINLEVEL_OUTOF10: 3
PAINLEVEL_OUTOF10: 5

## 2024-07-12 NOTE — ED NOTES
Returned from radiology the second tiime.   [FreeTextEntry1] : Ms. Bright is an 85 yo F with PMHx CAD s/p PCI (11/2021), HTN, HLD, hypothyroidism, TAVR (11/2021), serous endometrial cancer (Tx with [facility], dx 2022) s/p chemo x 2 (Carboplatin, Taxol, Neulasta started in August) who had recent admit to [facility] (8/31-9/7) found to have RUL segmental PE and was treated for HFpEF exacerbation.  Her CXR from 9/4/22 showed persistent bilateral pleural effusions.  Her exam today is consistent with persistent volume overload and she would benefit from further diuresis.  PFTs (unable to perform lung volume and diffusion capacity maneuvers) - on spirometry no evidence of obstruction.\par \par Volume overload in setting of HFpEF\par - Increase Lasix to 20mg BID for next 5 days, then resume lasix daily\par - Advised daily BP, weight monitoring (baseline was 150lbs, but now 140lbs) to assist with volume status\par - Follow-up with Cardiologist Dr. Vela at [facility] as scheduled\par - may benefit from HR optimization in setting of HFpEF\par \par Hypoxia with exertion\par - Related to volume overload, diuresis per above\par - recommend pulse ox monitoring, no need for suppl O2 at rest but should monitor oxygen levels when ambulating/working with PT and use to maintain O2Sat >88%\par \par Pulmonary embolism\par - Currently on Lovenox for AC\par - Has outpatient follow-up with Heme/Onc for possible transition to NOAC\par \par Peg Gomez MD\par \par

## 2024-07-12 NOTE — PROGRESS NOTES
Pt. Arrived to unit at 1630. RN obtained vitals. Providers made aware of pt. Arrival to the unit.

## 2024-07-12 NOTE — H&P
Internal Medicine  Resident History & Physical    Patient:  Kimberlee Alvarado, 74 y.o. female  : 1950  Unit: [id number]  MRN:  [id number]     Acct:  [id number]    PCP:  Jim Luz APRN - CNP    Date of Admission: 2024    Date of Service: Patient seen / examined on 24 and admitted to Inpatient with expected length of stay greater than two midnights due to medical therapy.       ASSESSMENT / PLAN:  Breast cancer, suspected metastatic.  Leukocytosis.  Follows with Dr. Escalante.  Had recent PET scan showing uptake in the cervical spine, hips.  No history of trauma.  No recent chemotherapy or radiation therapy.  Missed lung biopsy  at [facility].  CA 27-29 elevated, associated with breast cancer.  CT chest with 6 cm right lower lobe nodule, 1 cm nodule abutting the fissure.  CT A/P suggesting omental carcinomatosis, moderate ascites.  Pracentesis in the morning  Paracentesis fluid studies, cytology  Obtain previous progress note from Dr. Escalante's office  Consult Dr. Escalante  Obtain PET scan results  Atrial fibrillation, paroxysmal.  Long QTc.  Follows Dr. Tijerina.  On aspirin, did not want Eliquis or Xarelto previously.  Was in sinus rhythm at last clinic visit.  Initially presents with rate controlled atrial fibrillation.  EKG shows A-fib, rate 109, QTc 511.  Has not been able to take medications due to nausea, emesis prior to admission  IV Lopressor 2.5 mg every 6 hours  SCDs  Hold aspirin  Continue telemetry  Nausea, dark red emesis.  Decreased p.o. intake over past month.  No hematochezia, did endorse melena .  BUN elevated, 44.  GI consulted. If Hgb remains stable, may need to wait until Monday for EGD  N.p.o.  No Zofran  Phenergan  H&H every 12 hours  IV Protonix 40 mg twice daily  Monitor for emesis  GI consult in the morning  Hypertension.  On home Lopressor, Aldactazide.  Hyponatremia, hypervolemic. Mild.  NS 50 cc/h  BMP  Lactic acidemia.  Trend every 12 hours  Blood cultures  Metabolic Panel   Result Value Ref Range    Sodium 131 (L) 136 - 145 meq/l    Potassium 4.1 3.5 - 5.1 meq/l    Chloride 93 (L) 98 - 107 meq/l    CO2 27 21 - 32 meq/l    Glucose 135 (H) 74 - 106 mg/dl    BUN 44 (H) 7 - 18 mg/dl    Creatinine 1.3 0.6 - 1.3 mg/dl    POC CALCIUM 9.7 8.5 - 10.1 mg/dl   Magnesium   Result Value Ref Range    Magnesium 1.9 1.8 - 2.4 mg/dl   Lipase   Result Value Ref Range    Lipase 68.0 16.0 - 77.0 U/L   Troponin   Result Value Ref Range    Troponin, High Sensitivity 29.4 0.0 - 51.3 pg/mL   Urinalysis with Reflex to Culture    Specimen: Urine   Result Value Ref Range    Glucose, Ur NEGATIVE NEGATIVE mg/dl    Bilirubin, Urine NEGATIVE     Ketones, Urine NEGATIVE NEGATIVE    Specific Gravity, UA 1.020 1.002 - 1.030    Blood, Urine NEGATIVE NEGATIVE    pH, Urine 5.5 5.0 - 9.0    Protein, UA NEGATIVE NEGATIVE mg/dl    Urobilinogen, Urine 0.2 0.0 - 1.0 eu/dl    Nitrite, Urine NEGATIVE NEGATIVE    Leukocyte Esterase, Urine NEGATIVE NEGATIVE    Color, UA YELLOW STRAW-YELLOW    Character, Urine CLEAR CLEAR-SL CLOUD    RBC, UA NONE 0-2/hpf /hpf    WBC, UA 0-2 0-4/hpf /hpf    Epithelial Cells, UA 3-5 3-5/hpf /hpf    Amorphous, UA NONE SEEN none seen    Mucus, UA THREADS none seen    Bacteria, UA FEW few/none seen    Casts UA 0-4 HYALINE none seen /lpf    Crystals, UA NONE SEEN none seen    REFLEX TO URINE C & S NOT INDICATED    Lactic Acid   Result Value Ref Range    Lactate 2.00 (H) 0.90 - 1.70 mmol/L   Glomerular Filtration Rate, Estimated   Result Value Ref Range    Est, Glom Filt Rate 43 (A) >60 ml/min/1.73m2   ANION GAP   Result Value Ref Range    Anion Gap 11.0 8.0 - 16.0 meq/l   EKG Infection   Result Value Ref Range    Ventricular Rate 109 BPM    QRS Duration 124 ms    Q-T Interval 380 ms    QTc Calculation (Bazett) 511 ms    R Axis -44 degrees    T Axis 94 degrees       Microbiology:  Results       Procedure Component Value Units Date/Time    COVID-19 & Influenza Combo [[id number]]  Correlate with liver function tests. 3. Moderate right-sided hydronephrosis and hydroureter is of uncertain etiology. The distal right ureter is difficult to visualize. No definite ureteral calculus is observed. Correlate with urinalysis. 4. Masslike opacity in the right lower lobe, incompletely visualized, measures 3.6 x 3.9 cm (series 2, image 1). CT chest is advised for complete characterization. **This report has been created using voice recognition software.  It may contain minor errors which are inherent in voice recognition technology.** Electronically signed by Dr. Jaja Cuello      Checklist Rounds:   ===  LDA:  None  Steroids: None  Antibiotics: zosyn  Trend Labs: CBC, BMP, LA, H&H  IVF:   NS @ 50 cc/hr  Bed Level: Inpatient, Med-Surg  Telemetry:  Remote telemetry  PT/OT : No  DVT PPX:  Held  ===  Disposition:  To be determined       DME Needs: No DME needs  Est D/C date: >3 days  Brief Plan: History of malignancy, with evidence of metastasis on imaging.  Leukocytosis, no clear source, on Zosyn for now.  Consult GI for hematemesis.      Good evening Dr. Joseph, patient is 74-year-old female direct admission from ambulatory [facility].  She has history of breast cancer, follows with Dr. Escalante, no recent chemo or other therapy, with imaging showing metastatic lesions in the lung.  She has 1 week history of nausea, with month history of weakness.  Endorses 1 episode of dark red emesis night prior, with melena.  Elevated BUN.  She has leukocytosis, hemoglobin seems stable.  Trending every 12 hours.  Also has history of A-fib, she uses aspirin for anticoagulation, being held.  Consult is for hematemesis, will sign out to night team. GI intervention should be ok to wait until morning.    D/c Barriers: None, excluding treatment  Staffed: Yes  ===  GI PPX:  PPI IV  Diet:   ADULT ORAL NUTRITION SUPPLEMENT; Breakfast, Lunch, Dinner; Standard High Calorie/High Protein Oral Supplement    CODE STATUS:

## 2024-07-12 NOTE — ED NOTES
Report and paperwork given to [facility], pt. Continues to c/o nausea and upper abdominal pain. Pulse regular. Extremities warm. Respirations regular and quiet.  Mucous membranes pink & moist. Alert and oriented times 3.  C/o nausea  Range of motion within patient's limits. Skin pale, warm and dry.  Calm and cooperative.

## 2024-07-12 NOTE — ED PROVIDER NOTES
[facility]  eMERGENCY dEPARTMENT eNCOUnter          CHIEF COMPLAINT       Chief Complaint   Patient presents with    Emesis    Nausea    Fatigue       Nurses Notes reviewed and I agree except as noted in the HPI.      HISTORY OF PRESENT ILLNESS    Kimberlee Alvarado is a 74 y.o. female who presents with nausea and emesis, she also is complaining fatigue.  Patient states that onset has been several weeks.  Patient states it worsened today.  Patient states he is on steroids.  She is wearing a soft collar around her neck because they think that there might be some sort of cervical problems she is willing to follow-up for this.  Patient has been on steroids for several weeks.  Patient states she has had emesis.  She denies any fevers.  Patient denies any chest pain or shortness of breath.  She is having abdominal pain infrequent bowel movements they are small and hard.  Denies any dysuria or blood in the urine however it has been very dark.  Patient has had no syncopal or presyncopal episodes no headaches or dizziness.  Patient does have a history of atrial fibrillation she is in atrial fibrillation currently.  Patient is only on a baby aspirin patient is otherwise resting comfortably on the cot no apparent distress no other physical complaints at this time.    REVIEW OF SYSTEMS     Review of Systems   Constitutional:  Negative for activity change, appetite change, diaphoresis, fatigue and unexpected weight change.   HENT:  Negative for congestion, ear discharge, ear pain, hearing loss, rhinorrhea, sinus pressure, sore throat, trouble swallowing and voice change.    Eyes:  Negative for photophobia, pain, discharge, redness and itching.   Respiratory:  Negative for cough, choking, chest tightness, shortness of breath and wheezing.    Cardiovascular:  Negative for chest pain, palpitations and leg swelling.   Gastrointestinal:  Positive for abdominal distention, abdominal pain, nausea and vomiting. Negative    1. Only seen on the lateral view is a rounded opacity overlying the lower lobes.   This can relate to infiltrate in the acute setting. Consider obtaining a CT of   the chest.               **This report has been created using voice recognition software.  It may contain   minor errors which are inherent in voice recognition technology.**      Electronically signed by Dr. Carmita Carvalho            LABS:   Labs Reviewed   CBC WITH AUTO DIFFERENTIAL - Abnormal; Notable for the following components:       Result Value    WBC 23.7 (*)     RBC 3.49 (*)     Hemoglobin 10.4 (*)     Hematocrit 31.1 (*)     Seg Neutrophils 81.0 (*)     Neutrophils Absolute 19.2 (*)     Lymphocytes 10.5 (*)     Monocytes 1.8 (*)     Immature Grans (Abs) 0.24 (*)     All other components within normal limits   BRAIN NATRIURETIC PEPTIDE - Abnormal; Notable for the following components:    NT Pro-BNP 4733.0 (*)     All other components within normal limits   BASIC METABOLIC PANEL - Abnormal; Notable for the following components:    Sodium 131 (*)     Chloride 93 (*)     Glucose 135 (*)     BUN 44 (*)     All other components within normal limits   LACTIC ACID - Abnormal; Notable for the following components:    Lactate 2.00 (*)     All other components within normal limits   GLOMERULAR FILTRATION RATE, ESTIMATED - Abnormal; Notable for the following components:    Est, Glom Filt Rate 43 (*)     All other components within normal limits   COVID-19 & INFLUENZA COMBO   COVID-19 & INFLUENZA COMBO   CULTURE, BLOOD 1   CULTURE, BLOOD 2   MAGNESIUM   LIPASE   TROPONIN   URINALYSIS WITH REFLEX TO CULTURE   ANION GAP   TSH   PROCALCITONIN       EMERGENCY DEPARTMENT COURSE:   Vitals:    Vitals:    07/12/24 1210 07/12/24 1225 07/12/24 1240 07/12/24 1255   BP: 123/65 (!) 115/56 (!) 109/45 (!) 110/45   Pulse: 86 97 81 (!) 109   Resp: 12 14 15 24   Temp:       TempSrc:       SpO2: 97% 96% 99% 97%   Weight:       Height:         Patient was assessed at

## 2024-07-12 NOTE — ED NOTES
Pt. C/o abdominal pain across upper abdomen, requesting to take oxycodone from home supply, ok per Dr. Dumont.  Pt. And daughter aware of admit to [facility] and agreeable.  Pt.' Abdomen soft, bloated, ABS audible, tender to palpation.

## 2024-07-12 NOTE — ED TRIAGE NOTES
Pt. Presents ambulatory to ED with c/o weakness, nausea and emesis, for past 3 weeks with 18 lb weight loss.  Pt. Voices she takes her medication and   \"throws it up\".  Pt. Reports she was suppose to have lung biopsy today at [facility], but feels so weak and ill, she called her doctor and they advised she go to ER.

## 2024-07-12 NOTE — PROGRESS NOTES
Attempted to complete med rec but patient unable to remember two new medications she just started. She stated her daughter is on the way and should have an updated list. Primary nurse made aware.

## 2024-07-13 ENCOUNTER — APPOINTMENT (OUTPATIENT)
Age: 74
End: 2024-07-13
Payer: MEDICARE

## 2024-07-13 ENCOUNTER — APPOINTMENT (OUTPATIENT)
Dept: ULTRASOUND IMAGING | Age: 74
End: 2024-07-13
Payer: MEDICARE

## 2024-07-13 PROBLEM — R91.8 LUNG MASS: Status: ACTIVE | Noted: 2024-07-13

## 2024-07-13 PROBLEM — R18.8 OTHER ASCITES: Status: ACTIVE | Noted: 2024-07-13

## 2024-07-13 LAB
ALBUMIN SERPL BCG-MCNC: 3.4 G/DL (ref 3.5–5.1)
ANION GAP SERPL CALC-SCNC: 14 MEQ/L (ref 8–16)
BUN SERPL-MCNC: 35 MG/DL (ref 7–22)
CALCIUM SERPL-MCNC: 8.9 MG/DL (ref 8.5–10.5)
CHLORIDE SERPL-SCNC: 95 MEQ/L (ref 98–111)
CO2 SERPL-SCNC: 24 MEQ/L (ref 23–33)
CREAT SERPL-MCNC: 1.2 MG/DL (ref 0.4–1.2)
DEPRECATED RDW RBC AUTO: 47.7 FL (ref 35–45)
ERYTHROCYTE [DISTWIDTH] IN BLOOD BY AUTOMATED COUNT: 14.4 % (ref 11.5–14.5)
FERRITIN SERPL IA-MCNC: 787 NG/ML (ref 10–291)
FOLATE SERPL-MCNC: > 20 NG/ML (ref 4.8–24.2)
GFR SERPL CREATININE-BSD FRML MDRD: 48 ML/MIN/1.73M2
GGT SERPL-CCNC: 48 U/L (ref 8–69)
GLUCOSE SERPL-MCNC: 102 MG/DL (ref 70–108)
HCT VFR BLD AUTO: 30.3 % (ref 37–47)
HCT VFR BLD AUTO: 33.1 % (ref 37–47)
HGB BLD-MCNC: 10.1 GM/DL (ref 12–16)
HGB BLD-MCNC: 10.7 GM/DL (ref 12–16)
HGB RETIC QN AUTO: 33 PG (ref 28.2–35.7)
IMM RETICS NFR: 10 % (ref 3–15.9)
IRON SATN MFR SERPL: 10 % (ref 20–50)
IRON SERPL-MCNC: 25 UG/DL (ref 50–170)
LACTATE SERPL-SCNC: 1.1 MMOL/L (ref 0.5–2)
LDH SERPL L TO P-CCNC: 369 U/L (ref 100–190)
MCH RBC QN AUTO: 29.6 PG (ref 26–33)
MCHC RBC AUTO-ENTMCNC: 32.3 GM/DL (ref 32.2–35.5)
MCV RBC AUTO: 91.4 FL (ref 81–99)
PLATELET # BLD AUTO: 282 THOU/MM3 (ref 130–400)
PMV BLD AUTO: 9.7 FL (ref 9.4–12.4)
POTASSIUM SERPL-SCNC: 4.2 MEQ/L (ref 3.5–5.2)
PROCALCITONIN SERPL IA-MCNC: 0.1 NG/ML (ref 0.01–0.09)
PROT SERPL-MCNC: 5.7 G/DL (ref 6.1–8)
RBC # BLD AUTO: 3.62 MILL/MM3 (ref 4.2–5.4)
RETICS # AUTO: 125 THOU/MM3 (ref 20–115)
RETICS/RBC NFR AUTO: 3.5 % (ref 0.5–2)
SODIUM SERPL-SCNC: 133 MEQ/L (ref 135–145)
TIBC SERPL-MCNC: 245 UG/DL (ref 171–450)
TSH SERPL DL<=0.005 MIU/L-ACNC: 1.47 UIU/ML (ref 0.4–4.2)
VIT B12 SERPL-MCNC: 620 PG/ML (ref 211–911)
WBC # BLD AUTO: 24 THOU/MM3 (ref 4.8–10.8)

## 2024-07-13 PROCEDURE — 1200000003 HC TELEMETRY R&B

## 2024-07-13 PROCEDURE — 84155 ASSAY OF PROTEIN SERUM: CPT

## 2024-07-13 PROCEDURE — 6360000002 HC RX W HCPCS

## 2024-07-13 PROCEDURE — 80048 BASIC METABOLIC PNL TOTAL CA: CPT

## 2024-07-13 PROCEDURE — 2580000003 HC RX 258

## 2024-07-13 PROCEDURE — 85014 HEMATOCRIT: CPT

## 2024-07-13 PROCEDURE — 83615 LACTATE (LD) (LDH) ENZYME: CPT

## 2024-07-13 PROCEDURE — 82040 ASSAY OF SERUM ALBUMIN: CPT

## 2024-07-13 PROCEDURE — 83986 ASSAY PH BODY FLUID NOS: CPT

## 2024-07-13 PROCEDURE — 49083 ABD PARACENTESIS W/IMAGING: CPT

## 2024-07-13 PROCEDURE — 88342 IMHCHEM/IMCYTCHM 1ST ANTB: CPT

## 2024-07-13 PROCEDURE — 6360000002 HC RX W HCPCS: Performed by: PHYSICIAN ASSISTANT

## 2024-07-13 PROCEDURE — 93005 ELECTROCARDIOGRAM TRACING: CPT | Performed by: INTERNAL MEDICINE

## 2024-07-13 PROCEDURE — 87075 CULTR BACTERIA EXCEPT BLOOD: CPT

## 2024-07-13 PROCEDURE — 88112 CYTOPATH CELL ENHANCE TECH: CPT

## 2024-07-13 PROCEDURE — 0W9G3ZZ DRAINAGE OF PERITONEAL CAVITY, PERCUTANEOUS APPROACH: ICD-10-PCS | Performed by: RADIOLOGY

## 2024-07-13 PROCEDURE — 89051 BODY FLUID CELL COUNT: CPT

## 2024-07-13 PROCEDURE — 85027 COMPLETE CBC AUTOMATED: CPT

## 2024-07-13 PROCEDURE — 83540 ASSAY OF IRON: CPT

## 2024-07-13 PROCEDURE — 82607 VITAMIN B-12: CPT

## 2024-07-13 PROCEDURE — 88341 IMHCHEM/IMCYTCHM EA ADD ANTB: CPT

## 2024-07-13 PROCEDURE — 82042 OTHER SOURCE ALBUMIN QUAN EA: CPT

## 2024-07-13 PROCEDURE — 87205 SMEAR GRAM STAIN: CPT

## 2024-07-13 PROCEDURE — 82945 GLUCOSE OTHER FLUID: CPT

## 2024-07-13 PROCEDURE — 83550 IRON BINDING TEST: CPT

## 2024-07-13 PROCEDURE — 85018 HEMOGLOBIN: CPT

## 2024-07-13 PROCEDURE — 83605 ASSAY OF LACTIC ACID: CPT

## 2024-07-13 PROCEDURE — 82746 ASSAY OF FOLIC ACID SERUM: CPT

## 2024-07-13 PROCEDURE — 82728 ASSAY OF FERRITIN: CPT

## 2024-07-13 PROCEDURE — 99233 SBSQ HOSP IP/OBS HIGH 50: CPT | Performed by: INTERNAL MEDICINE

## 2024-07-13 PROCEDURE — 6370000000 HC RX 637 (ALT 250 FOR IP)

## 2024-07-13 PROCEDURE — 85046 RETICYTE/HGB CONCENTRATE: CPT

## 2024-07-13 PROCEDURE — 88305 TISSUE EXAM BY PATHOLOGIST: CPT

## 2024-07-13 PROCEDURE — 36415 COLL VENOUS BLD VENIPUNCTURE: CPT

## 2024-07-13 PROCEDURE — 87070 CULTURE OTHR SPECIMN AEROBIC: CPT

## 2024-07-13 PROCEDURE — 82150 ASSAY OF AMYLASE: CPT

## 2024-07-13 RX ORDER — POLYETHYLENE GLYCOL 3350 17 G/17G
17 POWDER, FOR SOLUTION ORAL DAILY
Status: DISCONTINUED | OUTPATIENT
Start: 2024-07-13 | End: 2024-07-18 | Stop reason: HOSPADM

## 2024-07-13 RX ORDER — PANTOPRAZOLE SODIUM 40 MG/1
40 TABLET, DELAYED RELEASE ORAL
Status: DISCONTINUED | OUTPATIENT
Start: 2024-07-13 | End: 2024-07-13

## 2024-07-13 RX ORDER — PANTOPRAZOLE SODIUM 40 MG/1
40 TABLET, DELAYED RELEASE ORAL
Status: DISCONTINUED | OUTPATIENT
Start: 2024-07-14 | End: 2024-07-18 | Stop reason: HOSPADM

## 2024-07-13 RX ADMIN — PROMETHAZINE HYDROCHLORIDE 12.5 MG: 25 INJECTION INTRAMUSCULAR; INTRAVENOUS at 11:41

## 2024-07-13 RX ADMIN — METOPROLOL TARTRATE 25 MG: 50 TABLET, FILM COATED ORAL at 09:55

## 2024-07-13 RX ADMIN — OXYCODONE HYDROCHLORIDE 5 MG: 5 TABLET ORAL at 09:55

## 2024-07-13 RX ADMIN — OXYCODONE HYDROCHLORIDE 5 MG: 5 TABLET ORAL at 18:33

## 2024-07-13 RX ADMIN — PIPERACILLIN AND TAZOBACTAM 3375 MG: 3; .375 INJECTION, POWDER, FOR SOLUTION INTRAVENOUS at 22:27

## 2024-07-13 RX ADMIN — PIPERACILLIN AND TAZOBACTAM 3375 MG: 3; .375 INJECTION, POWDER, FOR SOLUTION INTRAVENOUS at 14:44

## 2024-07-13 RX ADMIN — PROMETHAZINE HYDROCHLORIDE 12.5 MG: 25 INJECTION INTRAMUSCULAR; INTRAVENOUS at 03:46

## 2024-07-13 RX ADMIN — PIPERACILLIN AND TAZOBACTAM 3375 MG: 3; .375 INJECTION, POWDER, FOR SOLUTION INTRAVENOUS at 06:05

## 2024-07-13 RX ADMIN — METOPROLOL TARTRATE 25 MG: 50 TABLET, FILM COATED ORAL at 21:03

## 2024-07-13 RX ADMIN — PANTOPRAZOLE SODIUM 40 MG: 40 INJECTION, POWDER, FOR SOLUTION INTRAVENOUS at 06:05

## 2024-07-13 ASSESSMENT — PAIN DESCRIPTION - LOCATION
LOCATION: ABDOMEN
LOCATION: ABDOMEN

## 2024-07-13 ASSESSMENT — PAIN SCALES - GENERAL
PAINLEVEL_OUTOF10: 7
PAINLEVEL_OUTOF10: 0
PAINLEVEL_OUTOF10: 3

## 2024-07-13 ASSESSMENT — PAIN - FUNCTIONAL ASSESSMENT: PAIN_FUNCTIONAL_ASSESSMENT: ACTIVITIES ARE NOT PREVENTED

## 2024-07-13 ASSESSMENT — PAIN DESCRIPTION - DESCRIPTORS: DESCRIPTORS: DISCOMFORT

## 2024-07-13 NOTE — PROGRESS NOTES
Internal Medicine  Resident Progress Note    Patient:  Kimberlee Alvarado, 74 y.o. female  : 1950  Unit: 6K-18/018-A  MRN:  [id number]     Acct:  [id number]    PCP:  Jim Luz APRN - CNP    Hospital Day:  1  Date of Admission:  2024    Date of Service:  24      ASSESSMENT / PLAN:   Breast cancer, suspected metastatic.  Leukocytosis.  Follows with Dr. Escalante.  Had recent PET scan showing uptake in the cervical spine, hips.  No history of trauma.  No recent chemotherapy or radiation therapy.  Missed lung biopsy  at [facility].  CA 27-29 elevated, associated with breast cancer.  CT chest with 6 cm right lower lobe nodule, 1 cm nodule abutting the fissure.  CT A/P suggesting omental carcinomatosis, moderate ascites. S/p paracentesis .  PET scan results in chart review.  Paracentesis fluid studies, cytology  Leukocytosis.  Worsening.  Normal this admission, with recent lab work showing baseline manage 7 WNL.  Zosyn, beginning , 5-day course.  CBC  Atrial fibrillation, paroxysmal.  Long QTc.  Follows Dr. Tijerina.  On aspirin, did not want Eliquis or Xarelto previously.  Was in sinus rhythm at last clinic visit.  Initially presents with rate controlled atrial fibrillation.  EKG shows A-fib, rate 109, QTc 511.  Has not been able to take medications due to nausea, emesis prior to admission  IV Lopressor 2.5 mg every 6 hours  SCDs  Hold aspirin  Continue telemetry  Nausea, dark red emesis.  Decreased p.o. intake over past month.  No hematochezia, did endorse melena .  BUN elevated, 44.  GI suspect NSAID induced hematemesis.  N.p.o.  No Zofran  Phenergan  H&H every 12 hours  IV Protonix 40 mg twice daily  Monitor for emesis  GI consult in the morning  Hypertension.  On home Lopressor, Aldactazide.  Hyponatremia, hypervolemic. Mild.  NS 50 cc/h  BMP  Lactic acidemia.  Trend every 12 hours  Blood cultures pending  Elevated alkaline phosphatase. Bone is source, as GGT wnl.    Chief Complaint:  the left female breast        COMPARISON EXAMINATION: None.        TECHNIQUE: Following the intravenous administration of 11.8 mCi of F-18 FDG, multiplanar   imaging acquisitions of the neck, chest, abdomen/pelvis to the mid thigh, obtained at 1   hour post radiopharmaceutical administration.  Interpretation is with co-registeration of   similar anatomic distribution of CT.        Findings:    Normal and physiologic distribution of radioisotope identified in the expected intensity   of the hepatic and splenic parenchyma, urinary tract and gastrointestinal structures.    There is gross anatomic distribution of the intracranial contents. Degenerative activity   of the right acromioclavicular joint.        INDEX LESION SIZE SUV INTERPRETATION:    1. 3.7 x 4.6 cm solid mass of the right lower lobe with eccentric calcification   demonstrates increased FDG activity (SUV 3.5). Spiculation/reticulation extends to the   medial pleural surface. Small adjacent satellite nodule is seen on image 102 of series 3.        2. Amorphous mildly elevated activity of the right ilium and adjacent sacrum is seen on   image 177 of series 12 with area measuring approximately 4.1 x 4.5 cm and SUV measuring up   to 2.3. There is potential slight expansion of the posterior ilium on image 172 of series   3 with lateral cortical thinning, although no reyna bony destruction/cortical disruption.   In addition, there is focal activity (SUV 4.4) of the right side of C1 (image 22 axial   fusion images, image 23 series 12). On  CT images, there is linear disruption of   the left posterolateral C1 ring and apparent discontinuity of the right C1 ring on image   22 of series 3.        CT portion of the exam:    Surgical clips in the left axilla. No discrete breast mass identified.        No pleural-based mass. There is pleural and parenchymal fibrotic scarring of the left   costophrenic angle.         Normal heart and pericardium.  There are    ===  LDA:  None  Steroids: None  Antibiotics: zosyn  Trend Labs: Cbc, bmp, h&h  IVF:   NS @ 50 cc/hr  Bed Level: Inpatient, Med-Surg  Telemetry:  Remote telemetry  PT/OT : No  DVT PPX:  Held  ===  Disposition:  To be determined       DME Needs: No DME needs  Est D/C date: >3 days  Brief Plan: Obtaining therapeutic/diagnostic paracentesis.  Continue with Zosyn and fluids.  D/c Barriers: None, excluding treatment  Staffed: Yes  ===  GI PPX:  PPI IV  Diet:   Diet NPO    CODE STATUS: Full      Electronically signed by Jimenez Hugo,  PGY-2 on 7/13/2024 at 3:53 PM.  Patient case staffed with attending physician Maninder Cabello MD on 7/13/2024.

## 2024-07-13 NOTE — PLAN OF CARE
Problem: Discharge Planning  Goal: Discharge to home or other facility with appropriate resources  7/13/2024 1402 by Jane Urban RN  Outcome: Progressing  Flowsheets (Taken 7/13/2024 1222)  Discharge to home or other facility with appropriate resources: Identify barriers to discharge with patient and caregiver  7/13/2024 0415 by Kierra Carnes RN  Outcome: Progressing  Note: Pt will discharge home when appropriate.       Problem: Safety - Adult  Goal: Free from fall injury  7/13/2024 1402 by Jane Urban RN  Outcome: Progressing  Flowsheets (Taken 7/13/2024 1402)  Free From Fall Injury: Instruct family/caregiver on patient safety  7/13/2024 0415 by Kierra Carnes RN  Outcome: Progressing  Note: Fall precaution in place. Bed alarm/chair alarm. Bed locked in lowest position. Fall band applied if applicable. Call light and overhead table within reach. Will continue to monitor.       Problem: Pain  Goal: Verbalizes/displays adequate comfort level or baseline comfort level  7/13/2024 1402 by Jane Urban RN  Outcome: Progressing  Flowsheets (Taken 7/13/2024 1402)  Verbalizes/displays adequate comfort level or baseline comfort level: Encourage patient to monitor pain and request assistance  7/13/2024 0415 by Kierra Carnes RN  Outcome: Progressing  Note: Pt denies pain on my shift. Non pharmaceutical interventions like ice and repositioning offered before pain medications. Will continue to assess.     Patient aware and updated on plan of care

## 2024-07-13 NOTE — CONSULTS
Consult History & Physical      Patient:  Kimberlee Alvarado  YOB: 1950  MRN: [id number]     Acct: [id number]    Chief Complaint:    Chief Complaint   Patient presents with    Emesis    Nausea    Fatigue       Date of Service: Pt seen/examined in consultation on 7/13/2024    History Of Present Illness:      74 y.o. female who we are asked to see/evaluate by Maninder Cabello MD for medical management of hematemesis. She presented to [facility] yesterday with [facility] breast cancer status postlumpectomy following with Dr. Escalante.  Patient was supposed to have lung biopsy at [facility], however was too ill to go. She has had poor appetite for the past month with worsening nausea. She had one episode of hematemesis at home on 7/11, without subsequent occurrences. She denies melena. She endorses some abdominal tenderness on palpation. She reported she began taking meloxicam in January for Arthritis in her neck, but had stopped this on 7/2/24 per Dr. Escalante due to concern for GI irritation. She also takes a daily aspirin. Denies any other blood thinner use. She denies any previous EGD. She states she had a colonoscopy completed with Dr. Maza several years ago and was told she did not require anymore.     Past Medical History:    Past Medical History:   Diagnosis Date    Breast cancer (HCC)     2013 Left Invasive Ductal    Cancer (HCC) 2013    Breast LEFT    History of therapeutic radiation     completed 2014    Hx antineoplastic chemo     pre op chemo 2014    Nausea & vomiting 7/12/2024       Home Medications:  Prior to Admission medications    Medication Sig Start Date End Date Taking? Authorizing Provider   oxyCODONE 5 MG capsule Take 1 capsule by mouth every 6 hours as needed for Pain. Unsure of dosing Max Daily Amount: 20 mg   Yes Provider, MD Keeley   metoprolol tartrate (LOPRESSOR) 25 MG tablet Take 1 tablet by mouth 2 times daily 7/5/24   Vicente Tijerina MD  in the last 72 hours.    Radiology:   CTA CHEST W WO CONTRAST     Result Date: 7/12/2024  1. Mass in the right lower lobe measuring approximately 5.4 x 3.6 cm concerning for malignancy. 2. Additional 1 cm pulmonary nodule in the right lower lobe abutting the fissure. 3. There is a moderate amount of ascites in the partially visualized upper abdomen. 4. Hydronephrosis partially visualized in the right kidney. **This report has been created using voice recognition software. It may contain minor errors which are inherent in voice recognition technology.** Electronically signed by Dr Arnold Simms     CT ABDOMEN PELVIS W IV CONTRAST Additional Contrast? None     Result Date: 7/12/2024  1. Once again there is a masslike opacity in the right lower lobe, incompletely visualized. Please see the CTA chest performed the same day and dictated separately for additional assessment. 2. Persistent small to moderate generalized intra-abdominal ascites with diffuse mesenteric edema/omental thickening/infiltration suspicious for carcinomatosis. No bowel obstruction or organized fluid collection is identified. No adnexal mass is observed. No lymphadenopathy is visualized. 3. Stable nonspecific gallbladder distention. No radiopaque gallstones visualized. Correlate with liver function tests. 4. Stable moderate right-sided hydronephrosis and hydroureter remains of uncertain etiology, possibly secondary to the inflammation within the pelvic ascites/mesentery. No ureteral calculus is identified. Correlate with urinalysis. **This report has been created using voice recognition software.  It may contain minor errors which are inherent in voice recognition technology.** Electronically signed by Dr. Jaja Cuello     XR CHEST (2 VW)     Result Date: 7/12/2024  1. Only seen on the lateral view is a rounded opacity overlying the lower lobes. This can relate to infiltrate in the acute setting. Consider obtaining a CT of the chest. **This

## 2024-07-13 NOTE — PLAN OF CARE
Problem: Discharge Planning  Goal: Discharge to home or other facility with appropriate resources  Outcome: Progressing  Note: Pt will discharge home when appropriate.       Problem: Safety - Adult  Goal: Free from fall injury  Outcome: Progressing  Note: Fall precaution in place. Bed alarm/chair alarm. Bed locked in lowest position. Fall band applied if applicable. Call light and overhead table within reach. Will continue to monitor.       Problem: Pain  Goal: Verbalizes/displays adequate comfort level or baseline comfort level  Outcome: Progressing  Note: Pt denies pain on my shift. Non pharmaceutical interventions like ice and repositioning offered before pain medications. Will continue to assess.     Pt verbalizes understanding of care plan. Pt contributes to goal settings.

## 2024-07-14 LAB
ALBUMIN FLD-MCNC: 2.5 GM/DL
AMYLASE FLD-CCNC: 16 U/L
ANION GAP SERPL CALC-SCNC: 11 MEQ/L (ref 8–16)
BUN SERPL-MCNC: 29 MG/DL (ref 7–22)
CALCIUM SERPL-MCNC: 8.5 MG/DL (ref 8.5–10.5)
CHARACTER, BODY FLUID: CLEAR
CHLORIDE SERPL-SCNC: 97 MEQ/L (ref 98–111)
CO2 SERPL-SCNC: 24 MEQ/L (ref 23–33)
COLOR FLD: YELLOW
CREAT SERPL-MCNC: 1.1 MG/DL (ref 0.4–1.2)
DEPRECATED RDW RBC AUTO: 47.8 FL (ref 35–45)
EKG ATRIAL RATE: 67 BPM
EKG P AXIS: 67 DEGREES
EKG P-R INTERVAL: 144 MS
EKG Q-T INTERVAL: 440 MS
EKG QRS DURATION: 126 MS
EKG QTC CALCULATION (BAZETT): 464 MS
EKG R AXIS: -16 DEGREES
EKG T AXIS: 109 DEGREES
EKG VENTRICULAR RATE: 67 BPM
ERYTHROCYTE [DISTWIDTH] IN BLOOD BY AUTOMATED COUNT: 14.4 % (ref 11.5–14.5)
GFR SERPL CREATININE-BSD FRML MDRD: 53 ML/MIN/1.73M2
GLUCOSE FLD-MCNC: 101 MG/DL
GLUCOSE SERPL-MCNC: 88 MG/DL (ref 70–108)
GRANULOCYTES NFR FLD AUTO: 38.7 %
HCT VFR BLD AUTO: 27.9 % (ref 37–47)
HCT VFR BLD AUTO: 30.7 % (ref 37–47)
HGB BLD-MCNC: 10 GM/DL (ref 12–16)
HGB BLD-MCNC: 9.4 GM/DL (ref 12–16)
LDH FLD L TO P-CCNC: 299 U/L
MCH RBC QN AUTO: 29.8 PG (ref 26–33)
MCHC RBC AUTO-ENTMCNC: 32.6 GM/DL (ref 32.2–35.5)
MCV RBC AUTO: 91.4 FL (ref 81–99)
MONONUC CELLS NFR FLD AUTO: 61.3 %
NUC CELL # FLD AUTO: 748 /CUMM (ref 0–500)
PATHOLOGIST REVIEW: ABNORMAL
PH BIFL: 7.9 [PH]
PLATELET # BLD AUTO: 260 THOU/MM3 (ref 130–400)
PMV BLD AUTO: 10.2 FL (ref 9.4–12.4)
POTASSIUM SERPL-SCNC: 3.7 MEQ/L (ref 3.5–5.2)
RBC # BLD AUTO: 3.36 MILL/MM3 (ref 4.2–5.4)
RBC # FLD AUTO: < 2000 /CUMM
SODIUM SERPL-SCNC: 132 MEQ/L (ref 135–145)
SPECIMEN: ABNORMAL
TOTAL VOLUME RECEIVED BODY FLUID: 70 ML
WBC # BLD AUTO: 21 THOU/MM3 (ref 4.8–10.8)

## 2024-07-14 PROCEDURE — 99233 SBSQ HOSP IP/OBS HIGH 50: CPT | Performed by: INTERNAL MEDICINE

## 2024-07-14 PROCEDURE — 93010 ELECTROCARDIOGRAM REPORT: CPT | Performed by: INTERNAL MEDICINE

## 2024-07-14 PROCEDURE — 85027 COMPLETE CBC AUTOMATED: CPT

## 2024-07-14 PROCEDURE — 85018 HEMOGLOBIN: CPT

## 2024-07-14 PROCEDURE — 6370000000 HC RX 637 (ALT 250 FOR IP)

## 2024-07-14 PROCEDURE — 2580000003 HC RX 258

## 2024-07-14 PROCEDURE — 1200000003 HC TELEMETRY R&B

## 2024-07-14 PROCEDURE — 85014 HEMATOCRIT: CPT

## 2024-07-14 PROCEDURE — 6370000000 HC RX 637 (ALT 250 FOR IP): Performed by: INTERNAL MEDICINE

## 2024-07-14 PROCEDURE — 6360000002 HC RX W HCPCS

## 2024-07-14 PROCEDURE — 36415 COLL VENOUS BLD VENIPUNCTURE: CPT

## 2024-07-14 PROCEDURE — 6360000002 HC RX W HCPCS: Performed by: PHYSICIAN ASSISTANT

## 2024-07-14 PROCEDURE — 80048 BASIC METABOLIC PNL TOTAL CA: CPT

## 2024-07-14 RX ORDER — BISACODYL 5 MG/1
5 TABLET, DELAYED RELEASE ORAL DAILY PRN
Status: DISCONTINUED | OUTPATIENT
Start: 2024-07-14 | End: 2024-07-18 | Stop reason: HOSPADM

## 2024-07-14 RX ORDER — BISACODYL 10 MG
10 SUPPOSITORY, RECTAL RECTAL PRN
Status: DISCONTINUED | OUTPATIENT
Start: 2024-07-14 | End: 2024-07-18 | Stop reason: HOSPADM

## 2024-07-14 RX ADMIN — NALOXEGOL OXALATE 12.5 MG: 12.5 TABLET, FILM COATED ORAL at 07:56

## 2024-07-14 RX ADMIN — PIPERACILLIN AND TAZOBACTAM 3375 MG: 3; .375 INJECTION, POWDER, FOR SOLUTION INTRAVENOUS at 14:51

## 2024-07-14 RX ADMIN — BISACODYL 5 MG: 5 TABLET, COATED ORAL at 15:43

## 2024-07-14 RX ADMIN — METOPROLOL TARTRATE 25 MG: 50 TABLET, FILM COATED ORAL at 07:54

## 2024-07-14 RX ADMIN — METOPROLOL TARTRATE 25 MG: 50 TABLET, FILM COATED ORAL at 20:14

## 2024-07-14 RX ADMIN — POLYETHYLENE GLYCOL 3350 17 G: 17 POWDER, FOR SOLUTION ORAL at 07:54

## 2024-07-14 RX ADMIN — PIPERACILLIN AND TAZOBACTAM 3375 MG: 3; .375 INJECTION, POWDER, FOR SOLUTION INTRAVENOUS at 22:33

## 2024-07-14 RX ADMIN — SODIUM CHLORIDE, PRESERVATIVE FREE 10 ML: 5 INJECTION INTRAVENOUS at 20:14

## 2024-07-14 RX ADMIN — PIPERACILLIN AND TAZOBACTAM 3375 MG: 3; .375 INJECTION, POWDER, FOR SOLUTION INTRAVENOUS at 06:10

## 2024-07-14 RX ADMIN — PANTOPRAZOLE SODIUM 40 MG: 40 TABLET, DELAYED RELEASE ORAL at 06:11

## 2024-07-14 RX ADMIN — PROMETHAZINE HYDROCHLORIDE 12.5 MG: 25 INJECTION INTRAMUSCULAR; INTRAVENOUS at 01:10

## 2024-07-14 RX ADMIN — MAGNESIUM HYDROXIDE 30 ML: 1200 LIQUID ORAL at 15:43

## 2024-07-14 RX ADMIN — SODIUM CHLORIDE: 9 INJECTION, SOLUTION INTRAVENOUS at 01:03

## 2024-07-14 NOTE — PLAN OF CARE
Problem: Metabolic/Fluid and Electrolytes - Adult  Goal: Electrolytes maintained within normal limits  Outcome: Progressing  Flowsheets (Taken 7/14/2024 0316)  Electrolytes maintained within normal limits:   Monitor labs and assess patient for signs and symptoms of electrolyte imbalances   Administer electrolyte replacement as ordered   Monitor response to electrolyte replacements, including repeat lab results as appropriate       Problem: Gastrointestinal - Adult  Goal: Minimal or absence of nausea and vomiting  Outcome: Progressing  Flowsheets (Taken 7/14/2024 0316)  Minimal or absence of nausea and vomiting:   Administer IV fluids as ordered to ensure adequate hydration   Administer ordered antiemetic medications as needed   Provide nonpharmacologic comfort measures as appropriate   Advance diet as tolerated, if ordered       Problem: Pain  Goal: Verbalizes/displays adequate comfort level or baseline comfort level  7/14/2024 0316 by Cyndy Singh RN  Outcome: Progressing  Flowsheets (Taken 7/14/2024 0316)  Verbalizes/displays adequate comfort level or baseline comfort level:   Encourage patient to monitor pain and request assistance   Assess pain using appropriate pain scale   Implement non-pharmacological measures as appropriate and evaluate response   Administer analgesics based on type and severity of pain and evaluate response       Problem: Safety - Adult  Goal: Free from fall injury  7/14/2024 0316 by Cyndy Singh RN  Outcome: Progressing  Flowsheets (Taken 7/14/2024 0316)  Free From Fall Injury: Instruct family/caregiver on patient safety  Note: Patient remains free from fall this shift. Bed alarm activated . Bed locked and lowest position.  2/4 side rails raised for safety. Patient has non-skid socks on. Pathway clear and possessions in reach. Call light within reach. Patient rounded on hourly.        Problem: Discharge Planning  Goal: Discharge to home or other facility with appropriate

## 2024-07-14 NOTE — PROGRESS NOTES
Hospitalist Progress Note  Internal Medicine Resident      Patient: Kimberlee Alvarado 74 y.o. female      Unit/Bed: -18/018-A    Admit Date: 7/12/2024      ASSESSMENT AND PLAN  Active Problems  Abdominal ascites, suspected malignant ascites, omental carcinomatosis: s/p paracentesis, SAAG < 1.1, concern for malignant ascites in the setting of breast cancer. Awaiting cytology from ascitic fluid.   RLL nodules: concerning for malignancy, noted on CTA chest. Will await cytology from ascitic fluid, if no malignant cells noted in ascitic fluid then can consider biopsy of lung nodule  Leukocytosis, improving: likely reactive in the setting of above. Monitor with CBC. Monitor for fevers. Continue IV Zosyn  Paroxysmal atrial fibrillation: continue lopressor.   Hypovolemic hyponatremia, mild: monitor Na with BMP, slowly improving.   Acute vs acute on chronic normocytic anemia: Hgb noted to be 10 on 6/11/24, prior hgb from 2022 was 13.2. planning outpatient GI f/u. Continue protonix. Monitor with CBC. Hold ASA in the setting of hematemesis, resume on 7/15 and monitor hemoglobin.   Resolved Problems  Hematemesis: likely 2/2 NSAID induced. Has not had EGD previously. GI recommending po protonix daily until f/u outpatient in 6 weeks  Lactic acidosis  Prolonged QTC  Chronic Conditions (reviewed and stable unless otherwise stated)  HTN: continue lopressor, aldactone, HCTZ w/ parameters  HLD: statin  Elevated alkaline phosphatase, bone source  C4-C7 bulging disc, foraminal stenosis; C1 fracture vs lesion: has been following with [facility] orthopedics. Planning outpatient CT.       LDA: []CVC / []PICC / []Midline / []Rodriguez / []Drains / []Mediport / [x]None  Antibiotics: IV zosyn  Steroids: none  Labs (still needed?): [x]Yes / []No  IVF (still needed?): []Yes / [x]No    Level of care: []Step Down / [x]Med-Surg  Bed Status: [x]Inpatient / []Observation  Telemetry: [x]Yes / []No  PT/OT: []Yes / [x]No    DVT Prophylaxis: [] Lovenox / []

## 2024-07-14 NOTE — PLAN OF CARE
Problem: Discharge Planning  Goal: Discharge to home or other facility with appropriate resources  7/14/2024 0830 by Jane Urban RN  Outcome: Progressing  Flowsheets (Taken 7/14/2024 0752)  Discharge to home or other facility with appropriate resources: Identify barriers to discharge with patient and caregiver  7/14/2024 0316 by Cyndy Singh RN  Outcome: Progressing  Flowsheets (Taken 7/14/2024 0316)  Discharge to home or other facility with appropriate resources: Identify barriers to discharge with patient and caregiver     Problem: Safety - Adult  Goal: Free from fall injury  7/14/2024 0830 by Jane Urban RN  Outcome: Progressing  Flowsheets (Taken 7/14/2024 0316 by Cyndy Singh RN)  Free From Fall Injury: Instruct family/caregiver on patient safety  7/14/2024 0316 by Cyndy Singh RN  Outcome: Progressing  Flowsheets (Taken 7/14/2024 0316)  Free From Fall Injury: Instruct family/caregiver on patient safety  Note: Patient remains free from fall this shift. Bed alarm activated . Bed locked and lowest position.  2/4 side rails raised for safety. Patient has non-skid socks on. Pathway clear and possessions in reach. Call light within reach. Patient rounded on hourly.      Problem: Pain  Goal: Verbalizes/displays adequate comfort level or baseline comfort level  7/14/2024 0830 by Jane Urban RN  Outcome: Progressing  Flowsheets (Taken 7/14/2024 0752)  Verbalizes/displays adequate comfort level or baseline comfort level: Encourage patient to monitor pain and request assistance  7/14/2024 0316 by Cyndy Singh RN  Outcome: Progressing  Flowsheets (Taken 7/14/2024 0316)  Verbalizes/displays adequate comfort level or baseline comfort level:   Encourage patient to monitor pain and request assistance   Assess pain using appropriate pain scale   Implement non-pharmacological measures as appropriate and evaluate response   Administer analgesics based on type and severity of pain and evaluate

## 2024-07-14 NOTE — PROCEDURES
PROCEDURE NOTE  Date: 7/13/2024   Name: Kimberlee Alvarado  YOB: 1950    Procedures      EKG was completed and handed to RN

## 2024-07-15 PROBLEM — E44.0 MODERATE MALNUTRITION (HCC): Status: ACTIVE | Noted: 2024-07-15

## 2024-07-15 LAB
ANION GAP SERPL CALC-SCNC: 14 MEQ/L (ref 8–16)
BUN SERPL-MCNC: 27 MG/DL (ref 7–22)
CALCIUM SERPL-MCNC: 8.1 MG/DL (ref 8.5–10.5)
CHLORIDE SERPL-SCNC: 98 MEQ/L (ref 98–111)
CO2 SERPL-SCNC: 21 MEQ/L (ref 23–33)
CREAT SERPL-MCNC: 1.1 MG/DL (ref 0.4–1.2)
DEPRECATED RDW RBC AUTO: 46.7 FL (ref 35–45)
ERYTHROCYTE [DISTWIDTH] IN BLOOD BY AUTOMATED COUNT: 14.1 % (ref 11.5–14.5)
GFR SERPL CREATININE-BSD FRML MDRD: 53 ML/MIN/1.73M2
GLUCOSE SERPL-MCNC: 109 MG/DL (ref 70–108)
HCT VFR BLD AUTO: 28 % (ref 37–47)
HGB BLD-MCNC: 9.2 GM/DL (ref 12–16)
MCH RBC QN AUTO: 29.9 PG (ref 26–33)
MCHC RBC AUTO-ENTMCNC: 32.9 GM/DL (ref 32.2–35.5)
MCV RBC AUTO: 90.9 FL (ref 81–99)
PLATELET # BLD AUTO: 233 THOU/MM3 (ref 130–400)
PMV BLD AUTO: 10.1 FL (ref 9.4–12.4)
POTASSIUM SERPL-SCNC: 4 MEQ/L (ref 3.5–5.2)
RBC # BLD AUTO: 3.08 MILL/MM3 (ref 4.2–5.4)
SODIUM SERPL-SCNC: 133 MEQ/L (ref 135–145)
WBC # BLD AUTO: 19.9 THOU/MM3 (ref 4.8–10.8)

## 2024-07-15 PROCEDURE — 80048 BASIC METABOLIC PNL TOTAL CA: CPT

## 2024-07-15 PROCEDURE — 6370000000 HC RX 637 (ALT 250 FOR IP)

## 2024-07-15 PROCEDURE — 85027 COMPLETE CBC AUTOMATED: CPT

## 2024-07-15 PROCEDURE — 36415 COLL VENOUS BLD VENIPUNCTURE: CPT

## 2024-07-15 PROCEDURE — 1200000003 HC TELEMETRY R&B

## 2024-07-15 PROCEDURE — 6370000000 HC RX 637 (ALT 250 FOR IP): Performed by: INTERNAL MEDICINE

## 2024-07-15 PROCEDURE — 99233 SBSQ HOSP IP/OBS HIGH 50: CPT | Performed by: INTERNAL MEDICINE

## 2024-07-15 PROCEDURE — 6360000002 HC RX W HCPCS

## 2024-07-15 PROCEDURE — 2580000003 HC RX 258

## 2024-07-15 RX ORDER — ONDANSETRON 2 MG/ML
4 INJECTION INTRAMUSCULAR; INTRAVENOUS EVERY 6 HOURS PRN
Status: DISCONTINUED | OUTPATIENT
Start: 2024-07-15 | End: 2024-07-18 | Stop reason: HOSPADM

## 2024-07-15 RX ORDER — PROMETHAZINE HYDROCHLORIDE 25 MG/1
12.5 TABLET ORAL EVERY 6 HOURS PRN
Status: DISCONTINUED | OUTPATIENT
Start: 2024-07-15 | End: 2024-07-18 | Stop reason: HOSPADM

## 2024-07-15 RX ADMIN — ATORVASTATIN CALCIUM 10 MG: 10 TABLET, FILM COATED ORAL at 08:26

## 2024-07-15 RX ADMIN — DILTIAZEM HYDROCHLORIDE 120 MG: 120 CAPSULE, COATED, EXTENDED RELEASE ORAL at 08:26

## 2024-07-15 RX ADMIN — PIPERACILLIN AND TAZOBACTAM 3375 MG: 3; .375 INJECTION, POWDER, FOR SOLUTION INTRAVENOUS at 05:51

## 2024-07-15 RX ADMIN — HYDROCHLOROTHIAZIDE 12.5 MG: 12.5 CAPSULE ORAL at 08:26

## 2024-07-15 RX ADMIN — PANTOPRAZOLE SODIUM 40 MG: 40 TABLET, DELAYED RELEASE ORAL at 05:47

## 2024-07-15 RX ADMIN — POLYETHYLENE GLYCOL 3350 17 G: 17 POWDER, FOR SOLUTION ORAL at 08:26

## 2024-07-15 RX ADMIN — OXYCODONE HYDROCHLORIDE 5 MG: 5 TABLET ORAL at 20:32

## 2024-07-15 RX ADMIN — NALOXEGOL OXALATE 12.5 MG: 12.5 TABLET, FILM COATED ORAL at 05:47

## 2024-07-15 RX ADMIN — METOPROLOL TARTRATE 25 MG: 50 TABLET, FILM COATED ORAL at 20:32

## 2024-07-15 RX ADMIN — SODIUM CHLORIDE, PRESERVATIVE FREE 10 ML: 5 INJECTION INTRAVENOUS at 20:32

## 2024-07-15 RX ADMIN — SPIRONOLACTONE 12.5 MG: 25 TABLET ORAL at 08:26

## 2024-07-15 RX ADMIN — METOPROLOL TARTRATE 25 MG: 50 TABLET, FILM COATED ORAL at 08:26

## 2024-07-15 ASSESSMENT — PAIN DESCRIPTION - DESCRIPTORS: DESCRIPTORS: ACHING

## 2024-07-15 ASSESSMENT — PAIN DESCRIPTION - LOCATION
LOCATION: ABDOMEN
LOCATION: ABDOMEN

## 2024-07-15 ASSESSMENT — PAIN DESCRIPTION - ORIENTATION: ORIENTATION: RIGHT;LEFT

## 2024-07-15 ASSESSMENT — PAIN SCALES - GENERAL
PAINLEVEL_OUTOF10: 3
PAINLEVEL_OUTOF10: 4
PAINLEVEL_OUTOF10: 3

## 2024-07-15 NOTE — PLAN OF CARE
Problem: Discharge Planning  Goal: Discharge to home or other facility with appropriate resources  7/15/2024 1114 by Deanna Loza RN  Outcome: Progressing  Flowsheets (Taken 7/15/2024 0822)  Discharge to home or other facility with appropriate resources: Identify barriers to discharge with patient and caregiver     Problem: Safety - Adult  Goal: Free from fall injury  7/15/2024 1114 by Deanna Loza RN  Outcome: Progressing  Note: No falls noted this shift. Continue falling star program. Bed alarm on, bed in low position. Call light and personal belongings in reach.  Patient uses call light appropriately.      Problem: Pain  Goal: Verbalizes/displays adequate comfort level or baseline comfort level  7/15/2024 1114 by Deanna Loza RN  Outcome: Progressing  Flowsheets (Taken 7/15/2024 0822)  Verbalizes/displays adequate comfort level or baseline comfort level: Encourage patient to monitor pain and request assistance     Problem: Gastrointestinal - Adult  Goal: Minimal or absence of nausea and vomiting  7/15/2024 1114 by Deanna Loza RN  Outcome: Progressing  Flowsheets (Taken 7/15/2024 0822)  Minimal or absence of nausea and vomiting: Administer IV fluids as ordered to ensure adequate hydration     Problem: Skin/Tissue Integrity  Goal: Absence of new skin breakdown  Description: 1.  Monitor for areas of redness and/or skin breakdown  2.  Assess vascular access sites hourly  3.  Every 4-6 hours minimum:  Change oxygen saturation probe site  4.  Every 4-6 hours:  If on nasal continuous positive airway pressure, respiratory therapy assess nares and determine need for appliance change or resting period.  7/15/2024 1114 by Deanna Loza RN  Outcome: Progressing  Note: No skin break down noted at this time. Encouraged patient to reposition self in bed.        Problem: Metabolic/Fluid and Electrolytes - Adult  Goal: Electrolytes maintained within normal limits  7/15/2024 1114 by Deanna Loza  RN  Outcome: Progressing  Flowsheets (Taken 7/15/2024 3153)  Electrolytes maintained within normal limits: Monitor labs and assess patient for signs and symptoms of electrolyte imbalances  Care plan reviewed with patient. Patient verbalizes understanding of plan of care and contributes to goal setting.

## 2024-07-15 NOTE — PLAN OF CARE
Problem: Metabolic/Fluid and Electrolytes - Adult  Goal: Electrolytes maintained within normal limits  Outcome: Progressing  Flowsheets (Taken 7/14/2024 2354)  Electrolytes maintained within normal limits:   Monitor labs and assess patient for signs and symptoms of electrolyte imbalances   Administer electrolyte replacement as ordered   Monitor response to electrolyte replacements, including repeat lab results as appropriate     Problem: Skin/Tissue Integrity  Goal: Absence of new skin breakdown  Description: 1.  Monitor for areas of redness and/or skin breakdown  2.  Assess vascular access sites hourly  3.  Every 4-6 hours minimum:  Change oxygen saturation probe site  4.  Every 4-6 hours:  If on nasal continuous positive airway pressure, respiratory therapy assess nares and determine need for appliance change or resting period.  Outcome: Progressing  Note:   Patient repositions every 2 hours. Heels elevated . Skin kept clean and dry. Skin assessed with every head to toe. Pratik scale complete.         Problem: Gastrointestinal - Adult  Goal: Minimal or absence of nausea and vomiting  Outcome: Progressing  Flowsheets (Taken 7/14/2024 2352)  Minimal or absence of nausea and vomiting:   Administer ordered antiemetic medications as needed   Provide nonpharmacologic comfort measures as appropriate   Advance diet as tolerated, if ordered       Problem: Pain  Goal: Verbalizes/displays adequate comfort level or baseline comfort level  Outcome: Progressing  Flowsheets (Taken 7/14/2024 2359)  Verbalizes/displays adequate comfort level or baseline comfort level:   Encourage patient to monitor pain and request assistance   Assess pain using appropriate pain scale   Administer analgesics based on type and severity of pain and evaluate response   Implement non-pharmacological measures as appropriate and evaluate response       Problem: Safety - Adult  Goal: Free from fall injury  Outcome: Progressing  Flowsheets (Taken

## 2024-07-15 NOTE — CARE COORDINATION
Case Management Assessment Initial Evaluation    Date/Time of Evaluation: 7/15/2024 8:32 AM  Assessment Completed by: Finesse Armando RN    If patient is discharged prior to next notation, then this note serves as note for discharge by case management.    Patient Name: Kimberlee Alvarado                   YOB: 1950  Diagnosis: Other ascites [R18.8]  Atrial fibrillation with rapid ventricular response (HCC) [I48.91]  Elevated lactic acid level [R79.89]  Pneumonia of upper lobe due to infectious organism, unspecified laterality [J18.9]  Nausea & vomiting [R11.2]                   Date / Time: 2024 10:32 AM  [facility]     Patient Admission Status: Inpatient   Readmission Risk Low 0-14, Mod 15-19), High > 20: Readmission Risk Score: 16.4    Current PCP: Jim Luz APRN - CNP    Additional Case Management Notes: Ca+8.1, WBC 19.9, Fe 25,ferritin 787, BNP 4733. ASA on hold for possible lung bx.     Procedures:  paracentesis: 1.36L    Imagin/12 CT abd: Mass-like opacity in RLL. Persistent ascites, suspicious for carcinomatosis. Right-sided hydronephrosis and hydroureter, possibly secondary to the inflammation within the pelvic  ascites/mesentery    Patient Goals/Plan/Treatment Preferences: Met with Kimberlee and her daughter. Kimberlee is from home alone, is very independent at home. She currently does not drive d/t medications she is using for a neck injury, has friends and family to transport. She feels she may benefit from a walker at discharge. Encouraged Kimberlee to get out of bed with the nurse today to ensure that she isn't having weakness or require additional discharge needs.        07/15/24 1119   Service Assessment   Patient Orientation Alert and Oriented   Cognition Alert   History Provided By Patient;Child/Family   Primary Caregiver Self   Support Systems Children;Family Members   Patient's Healthcare Decision Maker is: Named in Scanned ACP Document   PCP Verified by CM Yes

## 2024-07-15 NOTE — PROGRESS NOTES
Attending supervising physicians attestation statement:       I Discussed the findings and plans with the Resident physician  personally   and agree with the  note as outlined below  . spoke with the staff  Regarding care plans and recommendations.  Patient was seen and examined by this provider.         Electronically signed by Anastacio Kirkland MD on 7/15/2024 at 6:16 PM         Hospitalist Progress Note  Internal Medicine Resident      Patient: Kimberlee Alvarado 74 y.o. female      Unit/Bed: -18/018-A    Admit Date: 7/12/2024      ASSESSMENT AND PLAN  Active Problems  Abdominal ascites, suspected malignant ascites, omental carcinomatosis: s/p paracentesis, SAAG < 1.1, concern for malignant ascites in the setting of breast cancer. Awaiting cytology from ascitic fluid.   RLL nodules: concerning for malignancy, noted on CTA chest. Will await cytology from ascitic fluid, if no malignant cells noted in ascitic fluid then can consider biopsy of lung nodule  Leukocytosis, improving: likely reactive in the setting of above. Monitor with CBC. Monitor for fevers. Continue IV Zosyn  Paroxysmal atrial fibrillation: continue lopressor.   Hypovolemic hyponatremia, mild: monitor Na with BMP, slowly improving.   Acute vs acute on chronic normocytic anemia: Hgb noted to be 10 on 6/11/24, prior hgb from 2022 was 13.2. planning outpatient GI f/u. Continue protonix. Monitor with CBC. Hold ASA in the setting of hematemesis, resume on 7/15 and monitor hemoglobin.   Resolved Problems  Hematemesis: likely 2/2 NSAID induced. Has not had EGD previously. GI recommending po protonix daily until f/u outpatient in 6 weeks  Lactic acidosis  Prolonged QTC  Chronic Conditions (reviewed and stable unless otherwise stated)  HTN: continue lopressor, aldactone, HCTZ w/ parameters  HLD: statin  Elevated alkaline phosphatase, bone source  C4-C7 bulging disc, foraminal stenosis; C1 fracture vs lesion: has been following with [facility] orthopedics.

## 2024-07-15 NOTE — PROGRESS NOTES
Comprehensive Nutrition Assessment    Type and Reason for Visit:  Initial, Positive Nutrition Screen (weight loss, poor po)    Nutrition Recommendations/Plan:   Consider MVI when able to tolerate  ONS: Magic Cup TID and Ensure Plus TID  Continue current diet       Malnutrition Assessment:  Malnutrition Status:  Moderate malnutrition (07/15/24 1555)    Context:  Acute Illness     Findings of the 6 clinical characteristics of malnutrition:  Energy Intake:  50% or less of estimated energy requirements for 5 or more days  Weight Loss:   (reports 11.3% loss over 3 weeks however unable to confirm [facility] EMR)     Body Fat Loss:  Mild body fat loss Orbital   Muscle Mass Loss:  Mild muscle mass loss Temples (temporalis), Clavicles (pectoralis & deltoids)  Fluid Accumulation:  Unable to assess (due to ascites/ paracentesis)     Strength:  Not Performed    Nutrition Assessment:     Pt. moderately malnourished AEB criteria as listed above.  At risk for further nutrition compromise r/t admit with abdominal ascites, 7/13 paracentesis with 1.36 Liter removed, RLL nodules, poor appetite/ intake related to nausea/ vomiting x 3 weeks PTA and underlying medical condition (breast cancer, HTN, HLD).        Nutrition Related Findings:    Pt. Report/Treatments/Miscellaneous: Patient seen with daughter, reports poor appetite, intake less than 50% of her typical for past 3 weeks due to nausea/ vomiting and reports 18# weight loss during this time period, reports typically has breakfast and supper, snacks mid-day, reports nausea is less but belly , apprehensive to eat due to fear onset of nausea/ vomiting, does best with jello, pudding, ice cream, agrees to trial Magic Cup and prefers chocolate Ensure   GI Status: BM x 3 today  Pertinent Labs: Sodium 133, Potassium 4, BUN 27, Creatinine 1.1, glucose 109  Pertinent Meds: aldactone, movantik, glycolax      Wound Type: None       Current Nutrition Intake & Therapies:    Average  Meal Intake: 0%, 1-25%  Average Supplements Intake:  (dislikes vanilla Ensure, likes chocolate Ensure, agrees to trial magic cup as well)  ADULT DIET; Easy to Chew  ADULT ORAL NUTRITION SUPPLEMENT; Breakfast, Lunch, Dinner; Standard High Calorie/High Protein Oral Supplement  ADULT ORAL NUTRITION SUPPLEMENT; Breakfast, Lunch, Dinner; Frozen Oral Supplement    Anthropometric Measures:  Height: 162.6 cm (5' 4\")  Ideal Body Weight (IBW): 120 lbs (55 kg)    Admission Body Weight: 68.6 kg (151 lb 3.8 oz) (7/15; no edema)  Current Body Weight: 68.6 kg (151 lb 3.8 oz),   IBW.    Current BMI (kg/m2): 25.9  Usual Body Weight:  (reports UBW ~158# and loss of 18# over past 3 weeks to 140# last week at doctor office; per EMR: 9/11/23 156# 13oz. 10/11/23 158# 10oz, 4/30/24 152# 13oz, 5/29/24 151# 14oz)                       BMI Categories: Overweight (BMI 25.0-29.9)    Estimated Daily Nutrient Needs:  Energy Requirements Based On: Kcal/kg  Weight Used for Energy Requirements:  (69kgm on 7/15)  Energy (kcal/day): 4108-1783 kcals (20-25)  Weight Used for Protein Requirements: Ideal (55kgm)  Protein (g/day):  grams (1.3-2)     Nutrition Diagnosis:   Moderate malnutrition, In context of acute illness or injury related to inadequate protein-energy intake as evidenced by Criteria as identified in malnutrition assessment    Nutrition Interventions:   Food and/or Nutrient Delivery: Continue Current Diet, Modify Oral Nutrition Supplement  Nutrition Education/Counseling: Education initiated  Coordination of Nutrition Care: Continue to monitor while inpatient       Goals:     Goals: PO intake 75% or greater, by next RD assessment       Nutrition Monitoring and Evaluation:      Food/Nutrient Intake Outcomes: Food and Nutrient Intake, Supplement Intake  Physical Signs/Symptoms Outcomes: Biochemical Data, Nutrition Focused Physical Findings, Skin, Weight, GI Status, Fluid Status or Edema    Discharge Planning:    Too soon to determine      Jaja Holman, CAMPOS, LD  Contact: [phone]

## 2024-07-16 LAB
ANION GAP SERPL CALC-SCNC: 14 MEQ/L (ref 8–16)
BUN SERPL-MCNC: 28 MG/DL (ref 7–22)
CALCIUM SERPL-MCNC: 8.5 MG/DL (ref 8.5–10.5)
CHLORIDE SERPL-SCNC: 96 MEQ/L (ref 98–111)
CO2 SERPL-SCNC: 21 MEQ/L (ref 23–33)
CREAT SERPL-MCNC: 1 MG/DL (ref 0.4–1.2)
DEPRECATED RDW RBC AUTO: 48 FL (ref 35–45)
ERYTHROCYTE [DISTWIDTH] IN BLOOD BY AUTOMATED COUNT: 14.4 % (ref 11.5–14.5)
GFR SERPL CREATININE-BSD FRML MDRD: 59 ML/MIN/1.73M2
GLUCOSE SERPL-MCNC: 92 MG/DL (ref 70–108)
HCT VFR BLD AUTO: 27.2 % (ref 37–47)
HGB BLD-MCNC: 9.1 GM/DL (ref 12–16)
MCH RBC QN AUTO: 30.7 PG (ref 26–33)
MCHC RBC AUTO-ENTMCNC: 33.5 GM/DL (ref 32.2–35.5)
MCV RBC AUTO: 91.9 FL (ref 81–99)
PLATELET # BLD AUTO: 249 THOU/MM3 (ref 130–400)
PMV BLD AUTO: 10.6 FL (ref 9.4–12.4)
POTASSIUM SERPL-SCNC: 4.1 MEQ/L (ref 3.5–5.2)
RBC # BLD AUTO: 2.96 MILL/MM3 (ref 4.2–5.4)
SODIUM SERPL-SCNC: 131 MEQ/L (ref 135–145)
WBC # BLD AUTO: 18.7 THOU/MM3 (ref 4.8–10.8)

## 2024-07-16 PROCEDURE — 99232 SBSQ HOSP IP/OBS MODERATE 35: CPT | Performed by: STUDENT IN AN ORGANIZED HEALTH CARE EDUCATION/TRAINING PROGRAM

## 2024-07-16 PROCEDURE — 80048 BASIC METABOLIC PNL TOTAL CA: CPT

## 2024-07-16 PROCEDURE — 6370000000 HC RX 637 (ALT 250 FOR IP): Performed by: INTERNAL MEDICINE

## 2024-07-16 PROCEDURE — 85027 COMPLETE CBC AUTOMATED: CPT

## 2024-07-16 PROCEDURE — 36415 COLL VENOUS BLD VENIPUNCTURE: CPT

## 2024-07-16 PROCEDURE — 6370000000 HC RX 637 (ALT 250 FOR IP)

## 2024-07-16 PROCEDURE — 2580000003 HC RX 258

## 2024-07-16 PROCEDURE — 1200000003 HC TELEMETRY R&B

## 2024-07-16 RX ORDER — 0.9 % SODIUM CHLORIDE 0.9 %
1000 INTRAVENOUS SOLUTION INTRAVENOUS ONCE
Status: COMPLETED | OUTPATIENT
Start: 2024-07-16 | End: 2024-07-16

## 2024-07-16 RX ADMIN — PROMETHAZINE HYDROCHLORIDE 12.5 MG: 25 TABLET ORAL at 09:17

## 2024-07-16 RX ADMIN — NALOXEGOL OXALATE 12.5 MG: 12.5 TABLET, FILM COATED ORAL at 05:28

## 2024-07-16 RX ADMIN — ACETAMINOPHEN 650 MG: 325 TABLET ORAL at 09:09

## 2024-07-16 RX ADMIN — POLYETHYLENE GLYCOL 3350 17 G: 17 POWDER, FOR SOLUTION ORAL at 09:09

## 2024-07-16 RX ADMIN — SODIUM CHLORIDE, PRESERVATIVE FREE 10 ML: 5 INJECTION INTRAVENOUS at 20:05

## 2024-07-16 RX ADMIN — SODIUM CHLORIDE 1000 ML: 9 INJECTION, SOLUTION INTRAVENOUS at 09:24

## 2024-07-16 RX ADMIN — ACETAMINOPHEN 650 MG: 325 TABLET ORAL at 16:12

## 2024-07-16 RX ADMIN — SPIRONOLACTONE 12.5 MG: 25 TABLET ORAL at 09:09

## 2024-07-16 RX ADMIN — OXYCODONE HYDROCHLORIDE 5 MG: 5 TABLET ORAL at 20:05

## 2024-07-16 RX ADMIN — DILTIAZEM HYDROCHLORIDE 120 MG: 120 CAPSULE, COATED, EXTENDED RELEASE ORAL at 09:09

## 2024-07-16 RX ADMIN — PANTOPRAZOLE SODIUM 40 MG: 40 TABLET, DELAYED RELEASE ORAL at 05:28

## 2024-07-16 RX ADMIN — SODIUM CHLORIDE, PRESERVATIVE FREE 10 ML: 5 INJECTION INTRAVENOUS at 09:09

## 2024-07-16 RX ADMIN — HYDROCHLOROTHIAZIDE 12.5 MG: 12.5 CAPSULE ORAL at 09:09

## 2024-07-16 RX ADMIN — ATORVASTATIN CALCIUM 10 MG: 10 TABLET, FILM COATED ORAL at 09:09

## 2024-07-16 RX ADMIN — METOPROLOL TARTRATE 25 MG: 50 TABLET, FILM COATED ORAL at 09:09

## 2024-07-16 ASSESSMENT — PAIN DESCRIPTION - LOCATION
LOCATION: ABDOMEN

## 2024-07-16 ASSESSMENT — PAIN DESCRIPTION - ORIENTATION
ORIENTATION: RIGHT;LEFT;UPPER
ORIENTATION: UPPER;RIGHT;LEFT
ORIENTATION: RIGHT;LEFT

## 2024-07-16 ASSESSMENT — PAIN SCALES - GENERAL
PAINLEVEL_OUTOF10: 4

## 2024-07-16 ASSESSMENT — PAIN DESCRIPTION - DESCRIPTORS
DESCRIPTORS: SORE;PRESSURE
DESCRIPTORS: ACHING
DESCRIPTORS: ACHING;SORE

## 2024-07-16 ASSESSMENT — PAIN - FUNCTIONAL ASSESSMENT: PAIN_FUNCTIONAL_ASSESSMENT: ACTIVITIES ARE NOT PREVENTED

## 2024-07-16 NOTE — PROGRESS NOTES
Hospitalist Progress Note  Internal Medicine Resident      Patient: Kimberlee Alvarado 74 y.o. female      Unit/Bed: -18/018-A    Admit Date: 7/12/2024      ASSESSMENT AND PLAN  Active Problems  Abdominal ascites, suspected malignant ascites, omental carcinomatosis.  S/p paracentesis, SAAG < 1.1, concern for malignant ascites. History of breast cancer.   Ascitic fluid cytology pending  RLL nodules. Concerning for malignancy, noted on CTA chest. Will await cytology from ascitic fluid, if no malignant cells noted in ascitic fluid then can consider biopsy of lung nodule  Reactive leukocytosis, improving. No concern for infection. Status post Zosyn.  Hypovolemic hyponatremia, mild.  1 L NS  Monitor BMP  Acute vs acute on chronic normocytic anemia: Hgb noted to be 10 on 6/11/24, prior hgb from 2022 was 13.2. planning outpatient GI f/u. Continue protonix. Monitor with CBC. Hold ASA in the setting of hematemesis, resume on 7/15 and monitor hemoglobin.   Resolved Problems  Hematemesis: likely 2/2 NSAID induced. Has not had EGD previously. GI recommending po protonix daily until f/u outpatient in 6 weeks  Lactic acidosis  Prolonged QTC  Chronic Conditions (reviewed and stable unless otherwise stated)  Paroxysmal atrial fibrillation: continue lopressor.   HTN: continue lopressor, aldactone, HCTZ w/ parameters  HLD: statin  Elevated alkaline phosphatase, bone source  C4-C7 bulging disc, foraminal stenosis; C1 fracture vs lesion: has been following with [facility] orthopedics. Planning outpatient CT.       LDA: []CVC / []PICC / []Midline / []Rodriguez / []Drains / []Mediport / [x]None  Antibiotics: IV zosyn  Steroids: none  Labs (still needed?): [x]Yes / []No  IVF (still needed?): []Yes / [x]No    Level of care: []Step Down / [x]Med-Surg  Bed Status: [x]Inpatient / []Observation  Telemetry: [x]Yes / []No  PT/OT: []Yes / [x]No    DVT Prophylaxis: [] Lovenox / [] Heparin / [x] SCDs / [] Already on Systemic Anticoagulation / [] None  Daily    metoprolol tartrate  25 mg Oral BID    spironolactone  12.5 mg Oral Daily    And    hydroCHLOROthiazide  12.5 mg Oral Daily    PRN Meds: promethazine, ondansetron, bisacodyl, bisacodyl, magnesium hydroxide, sodium chloride flush, sodium chloride, potassium chloride **OR** potassium alternative oral replacement **OR** potassium chloride, magnesium sulfate, acetaminophen **OR** acetaminophen, oxyCODONE    Exam:  BP (!) 109/44   Pulse 63   Temp 97.8 °F (36.6 °C) (Oral)   Resp 16   Ht 1.626 m (5' 4\")   Wt 67.6 kg (149 lb)   SpO2 93%   BMI 25.58 kg/m²   General: No distress, appears stated age.  Eyes:  PERRL. Conjunctivae/corneas clear.  HENT: Head normal appearing. Nares normal. Oral mucosa moist.  Hearing intact. Soft cervical collar in place  Neck: Supple, with full range of motion. Trachea midline.  No gross JVD appreciated.  Respiratory:  Normal effort. Clear to auscultation, without rales or wheezes or rhonchi.  Cardiovascular: Normal rate, regular rhythm with normal S1/S2 without murmurs.  No lower extremity edema.   Abdomen: soft, mild tenderness to palpation  Musculoskeletal: No joint swelling or tenderness. Normal tone. No abnormal movements.   Skin: Warm and dry. No rashes or lesions.  Neurologic:  No focal sensory/motor deficits in the upper or lower extremities. Cranial nerves:  grossly non-focal 2-12.     Psychiatric: Alert and oriented, normal insight and thought content.   Capillary Refill: Brisk,< 3 seconds.  Peripheral Pulses: +2 palpable, equal bilaterally.       Labs/Radiology: See chart or assessment above.     Electronically signed by Jimenez Hugo DO  PGY-2 Internal Medicine Resident on 7/16/2024 at 3:11 PM  Case was discussed with Attending, Dr. Jackson

## 2024-07-16 NOTE — CARE COORDINATION
7/16/24, 1:57 PM EDT    DISCHARGE ON GOING EVALUATION    Kimberlee JASON [facility] day: 4  Location: 6-18/018-A Reason for admit: Other ascites [R18.8]  Atrial fibrillation with rapid ventricular response (HCC) [I48.91]  Elevated lactic acid level [R79.89]  Pneumonia of upper lobe due to infectious organism, unspecified laterality [J18.9]  Nausea & vomiting [R11.2]     Procedures:   7/13 paracentesis: 1.36L removed    Imaging since last note: none     Barriers to Discharge: Hospitalist following. Dietician- ensure plus and frozen supplement with meals. PT/OT- not ordered until this AM- priority tomorrow for potential SNF. IVF bolus x1.     Vitals:    07/15/24 2333 07/16/24 0358 07/16/24 0845 07/16/24 1130   BP: (!) 116/54 (!) 116/35 (!) 118/48 (!) 109/44   Pulse: 69 63 72 63   Resp: 18 18 16 16   Temp: 98.5 °F (36.9 °C) 98.4 °F (36.9 °C) 97.5 °F (36.4 °C) 97.8 °F (36.6 °C)   TempSrc: Oral Oral Oral Oral   SpO2: 90% 93% 95% 93%   Weight:  67.6 kg (149 lb)     Height:         Orthostatic Vitals:      7/16/2024    12:35 PM   Orthostatic Vitals   Orthostatic B/P and Pulse? Yes   Blood Pressure Lying 102/43   Pulse Lying 62 PER MINUTE   Blood Pressure Sitting 97/51   Pulse Sitting 67 PER MINUTE   Blood Pressure Standing 117/38   Pulse Standing 82 PER MINUTE     PCP: Jim Luz APRN - CNP  Readmission Risk Score: 14.6    Patient Goals/Plan/Treatment Preferences: Met w/ Kimberlee and her son at bedside with Dr. Hugo. Kimberlee and her son feel she is too weak to return home alone. PT/OT priority in AM for potential SNF. Provided with SNF list; will NOT need precert. SW consulted.

## 2024-07-16 NOTE — PLAN OF CARE
Problem: Discharge Planning  Goal: Discharge to home or other facility with appropriate resources  7/15/2024 2336 by Jimena Barber RN  Outcome: Progressing  Flowsheets (Taken 7/15/2024 2336)  Discharge to home or other facility with appropriate resources:   Identify barriers to discharge with patient and caregiver   Arrange for needed discharge resources and transportation as appropriate   Identify discharge learning needs (meds, wound care, etc)   Arrange for interpreters to assist at discharge as needed   Refer to discharge planning if patient needs post-hospital services based on physician order or complex needs related to functional status, cognitive ability or social support system     Problem: Safety - Adult  Goal: Free from fall injury  7/15/2024 2336 by Jimena Barber RN  Outcome: Progressing  Flowsheets (Taken 7/15/2024 2336)  Free From Fall Injury:   Instruct family/caregiver on patient safety   Based on caregiver fall risk screen, instruct family/caregiver to ask for assistance with transferring infant if caregiver noted to have fall risk factors     Problem: Pain  Goal: Verbalizes/displays adequate comfort level or baseline comfort level  7/15/2024 2336 by Jimena Barber RN  Outcome: Progressing  Flowsheets (Taken 7/15/2024 2336)  Verbalizes/displays adequate comfort level or baseline comfort level:   Encourage patient to monitor pain and request assistance   Assess pain using appropriate pain scale   Administer analgesics based on type and severity of pain and evaluate response   Implement non-pharmacological measures as appropriate and evaluate response   Consider cultural and social influences on pain and pain management   Notify Licensed Independent Practitioner if interventions unsuccessful or patient reports new pain     Problem: Gastrointestinal - Adult  Goal: Minimal or absence of nausea and vomiting  7/15/2024 2336 by Jimena Barber RN  Outcome: Progressing  Flowsheets (Taken 7/15/2024

## 2024-07-17 LAB
ANION GAP SERPL CALC-SCNC: 11 MEQ/L (ref 8–16)
BACTERIA BLD AEROBE CULT: NORMAL
BUN SERPL-MCNC: 26 MG/DL (ref 7–22)
CALCIUM SERPL-MCNC: 8.3 MG/DL (ref 8.5–10.5)
CHLORIDE SERPL-SCNC: 94 MEQ/L (ref 98–111)
CO2 SERPL-SCNC: 23 MEQ/L (ref 23–33)
CREAT SERPL-MCNC: 1.1 MG/DL (ref 0.4–1.2)
DEPRECATED RDW RBC AUTO: 47.5 FL (ref 35–45)
ERYTHROCYTE [DISTWIDTH] IN BLOOD BY AUTOMATED COUNT: 14.2 % (ref 11.5–14.5)
GFR SERPL CREATININE-BSD FRML MDRD: 53 ML/MIN/1.73M2
GLUCOSE SERPL-MCNC: 102 MG/DL (ref 70–108)
HCT VFR BLD AUTO: 27.1 % (ref 37–47)
HGB BLD-MCNC: 8.9 GM/DL (ref 12–16)
MCH RBC QN AUTO: 30.1 PG (ref 26–33)
MCHC RBC AUTO-ENTMCNC: 32.8 GM/DL (ref 32.2–35.5)
MCV RBC AUTO: 91.6 FL (ref 81–99)
PLATELET # BLD AUTO: 239 THOU/MM3 (ref 130–400)
PMV BLD AUTO: 9.8 FL (ref 9.4–12.4)
POTASSIUM SERPL-SCNC: 4.1 MEQ/L (ref 3.5–5.2)
RBC # BLD AUTO: 2.96 MILL/MM3 (ref 4.2–5.4)
SODIUM SERPL-SCNC: 128 MEQ/L (ref 135–145)
WBC # BLD AUTO: 16.9 THOU/MM3 (ref 4.8–10.8)

## 2024-07-17 PROCEDURE — 97166 OT EVAL MOD COMPLEX 45 MIN: CPT

## 2024-07-17 PROCEDURE — 6370000000 HC RX 637 (ALT 250 FOR IP)

## 2024-07-17 PROCEDURE — 85027 COMPLETE CBC AUTOMATED: CPT

## 2024-07-17 PROCEDURE — 6360000002 HC RX W HCPCS

## 2024-07-17 PROCEDURE — 97530 THERAPEUTIC ACTIVITIES: CPT

## 2024-07-17 PROCEDURE — 2580000003 HC RX 258

## 2024-07-17 PROCEDURE — 99232 SBSQ HOSP IP/OBS MODERATE 35: CPT | Performed by: INTERNAL MEDICINE

## 2024-07-17 PROCEDURE — 80048 BASIC METABOLIC PNL TOTAL CA: CPT

## 2024-07-17 PROCEDURE — 1200000003 HC TELEMETRY R&B

## 2024-07-17 PROCEDURE — 6370000000 HC RX 637 (ALT 250 FOR IP): Performed by: INTERNAL MEDICINE

## 2024-07-17 PROCEDURE — 36415 COLL VENOUS BLD VENIPUNCTURE: CPT

## 2024-07-17 RX ORDER — SODIUM CHLORIDE 1 G/1
1 TABLET ORAL
Status: DISCONTINUED | OUTPATIENT
Start: 2024-07-17 | End: 2024-07-18 | Stop reason: HOSPADM

## 2024-07-17 RX ADMIN — ACETAMINOPHEN 650 MG: 325 TABLET ORAL at 15:53

## 2024-07-17 RX ADMIN — SODIUM CHLORIDE, PRESERVATIVE FREE 10 ML: 5 INJECTION INTRAVENOUS at 08:14

## 2024-07-17 RX ADMIN — DILTIAZEM HYDROCHLORIDE 120 MG: 120 CAPSULE, COATED, EXTENDED RELEASE ORAL at 08:15

## 2024-07-17 RX ADMIN — HYDROCHLOROTHIAZIDE 12.5 MG: 12.5 CAPSULE ORAL at 08:14

## 2024-07-17 RX ADMIN — METOPROLOL TARTRATE 25 MG: 50 TABLET, FILM COATED ORAL at 08:14

## 2024-07-17 RX ADMIN — METOPROLOL TARTRATE 25 MG: 50 TABLET, FILM COATED ORAL at 20:10

## 2024-07-17 RX ADMIN — SODIUM CHLORIDE, PRESERVATIVE FREE 10 ML: 5 INJECTION INTRAVENOUS at 20:10

## 2024-07-17 RX ADMIN — SODIUM CHLORIDE 1 G: 1 TABLET ORAL at 15:52

## 2024-07-17 RX ADMIN — POLYETHYLENE GLYCOL 3350 17 G: 17 POWDER, FOR SOLUTION ORAL at 08:14

## 2024-07-17 RX ADMIN — SODIUM CHLORIDE 1 G: 1 TABLET ORAL at 10:56

## 2024-07-17 RX ADMIN — SPIRONOLACTONE 12.5 MG: 25 TABLET ORAL at 08:14

## 2024-07-17 RX ADMIN — CEFTRIAXONE SODIUM 1000 MG: 1 INJECTION, POWDER, FOR SOLUTION INTRAMUSCULAR; INTRAVENOUS at 10:56

## 2024-07-17 RX ADMIN — OXYCODONE HYDROCHLORIDE 5 MG: 5 TABLET ORAL at 22:45

## 2024-07-17 RX ADMIN — OXYCODONE HYDROCHLORIDE 5 MG: 5 TABLET ORAL at 08:14

## 2024-07-17 RX ADMIN — PANTOPRAZOLE SODIUM 40 MG: 40 TABLET, DELAYED RELEASE ORAL at 06:45

## 2024-07-17 RX ADMIN — ATORVASTATIN CALCIUM 10 MG: 10 TABLET, FILM COATED ORAL at 08:15

## 2024-07-17 RX ADMIN — NALOXEGOL OXALATE 12.5 MG: 12.5 TABLET, FILM COATED ORAL at 08:14

## 2024-07-17 ASSESSMENT — PAIN SCALES - GENERAL
PAINLEVEL_OUTOF10: 4
PAINLEVEL_OUTOF10: 5
PAINLEVEL_OUTOF10: 5
PAINLEVEL_OUTOF10: 3

## 2024-07-17 ASSESSMENT — PAIN DESCRIPTION - DESCRIPTORS
DESCRIPTORS: DISCOMFORT
DESCRIPTORS: DISCOMFORT

## 2024-07-17 ASSESSMENT — PAIN - FUNCTIONAL ASSESSMENT: PAIN_FUNCTIONAL_ASSESSMENT: ACTIVITIES ARE NOT PREVENTED

## 2024-07-17 ASSESSMENT — PAIN DESCRIPTION - LOCATION
LOCATION: ABDOMEN
LOCATION: ABDOMEN

## 2024-07-17 NOTE — PLAN OF CARE
Problem: Discharge Planning  Goal: Discharge to home or other facility with appropriate resources  Outcome: Progressing  Flowsheets (Taken 7/16/2024 2333)  Discharge to home or other facility with appropriate resources:   Identify barriers to discharge with patient and caregiver   Identify discharge learning needs (meds, wound care, etc)   Refer to discharge planning if patient needs post-hospital services based on physician order or complex needs related to functional status, cognitive ability or social support system   Arrange for needed discharge resources and transportation as appropriate   Arrange for interpreters to assist at discharge as needed     Problem: Safety - Adult  Goal: Free from fall injury  Outcome: Progressing  Flowsheets (Taken 7/16/2024 2333)  Free From Fall Injury:   Instruct family/caregiver on patient safety   Based on caregiver fall risk screen, instruct family/caregiver to ask for assistance with transferring infant if caregiver noted to have fall risk factors     Problem: Pain  Goal: Verbalizes/displays adequate comfort level or baseline comfort level  Outcome: Progressing  Flowsheets (Taken 7/16/2024 2333)  Verbalizes/displays adequate comfort level or baseline comfort level:   Encourage patient to monitor pain and request assistance   Administer analgesics based on type and severity of pain and evaluate response   Consider cultural and social influences on pain and pain management   Assess pain using appropriate pain scale   Implement non-pharmacological measures as appropriate and evaluate response   Notify Licensed Independent Practitioner if interventions unsuccessful or patient reports new pain     Problem: Gastrointestinal - Adult  Goal: Minimal or absence of nausea and vomiting  Outcome: Progressing  Flowsheets (Taken 7/16/2024 2333)  Minimal or absence of nausea and vomiting:   Administer IV fluids as ordered to ensure adequate hydration   Maintain NPO status until nausea and  patient.  Patient verbalizes understanding of the care plan and contributed to goal setting.

## 2024-07-17 NOTE — PROGRESS NOTES
Hospitalist Progress Note  Internal Medicine Resident      Patient: Kimberlee Alvarado 74 y.o. female      Unit/Bed: -18/018-A    Admit Date: 7/12/2024      ASSESSMENT AND PLAN  Active Problems  Abdominal ascites, suspected malignant ascites, omental carcinomatosis.  S/p paracentesis, SAAG < 1.1, concern for malignant ascites. History of breast cancer. Cytology with , neutrophil approx 290, study returned 7/17.  Ceftriaxone  RLL nodules. Concerning for malignancy, noted on CTA chest. Will await cytology from ascitic fluid, if no malignant cells noted in ascitic fluid then can consider biopsy of lung nodule  Reactive leukocytosis, improving. No concern for infection. Status post Zosyn.  Hypovolemic hyponatremia, mild.  1g Na tablets po tid  Monitor BMP  Acute vs acute on chronic normocytic anemia: Hgb noted to be 10 on 6/11/24, prior hgb from 2022 was 13.2. planning outpatient GI f/u. Continue protonix. Monitor with CBC. Hold ASA in the setting of hematemesis, resume on 7/15 and monitor hemoglobin.   Moderate protein calorie malnutrition.     Resolved Problems  Hematemesis: likely 2/2 NSAID induced. Has not had EGD previously. GI recommending po protonix daily until f/u outpatient in 6 weeks  Lactic acidosis  Prolonged QTC  Chronic Conditions (reviewed and stable unless otherwise stated)  Paroxysmal atrial fibrillation: continue lopressor.   HTN: continue lopressor, aldactone, HCTZ w/ parameters  HLD: statin  Elevated alkaline phosphatase, bone source  C4-C7 bulging disc, foraminal stenosis; C1 fracture vs lesion: has been following with [facility] orthopedics. Planning outpatient CT.       LDA: []CVC / []PICC / []Midline / []Rodriguez / []Drains / []Mediport / [x]None  Antibiotics: IV cefriaxone  Steroids: none  Labs (still needed?): [x]Yes / []No  IVF (still needed?): []Yes / [x]No    Level of care: []Step Down / [x]Med-Surg  Bed Status: [x]Inpatient / []Observation  Telemetry: [x]Yes / []No  PT/OT: []Yes /  Daily    metoprolol tartrate  25 mg Oral BID    spironolactone  12.5 mg Oral Daily    And    hydroCHLOROthiazide  12.5 mg Oral Daily    PRN Meds: promethazine, ondansetron, bisacodyl, bisacodyl, magnesium hydroxide, sodium chloride flush, sodium chloride, potassium chloride **OR** potassium alternative oral replacement **OR** potassium chloride, magnesium sulfate, acetaminophen **OR** acetaminophen, oxyCODONE    Exam:  BP (!) 110/50   Pulse 63   Temp 97.5 °F (36.4 °C) (Oral)   Resp 18   Ht 1.626 m (5' 4\")   Wt 67.1 kg (148 lb)   SpO2 94%   BMI 25.40 kg/m²   General: No distress, appears stated age.  Eyes:  PERRL. Conjunctivae/corneas clear.  HENT: Head normal appearing. Nares normal. Oral mucosa moist.  Hearing intact. Soft cervical collar in place  Neck: Supple, with full range of motion. Trachea midline.  No gross JVD appreciated.  Respiratory:  Normal effort. Clear to auscultation, without rales or wheezes or rhonchi.  Cardiovascular: Normal rate, regular rhythm with normal S1/S2 without murmurs.  No lower extremity edema.   Abdomen: soft, mild tenderness to palpation  Musculoskeletal: No joint swelling or tenderness. Normal tone. No abnormal movements.   Skin: Warm and dry. No rashes or lesions.  Neurologic:  No focal sensory/motor deficits in the upper or lower extremities. Cranial nerves:  grossly non-focal 2-12.     Psychiatric: Alert and oriented, normal insight and thought content.   Capillary Refill: Brisk,< 3 seconds.  Peripheral Pulses: +2 palpable, equal bilaterally.       Labs/Radiology: See chart or assessment above.     Electronically signed by Jimenez Hugo DO  PGY-2 Internal Medicine Resident on 7/17/2024 at 12:15 PM  Case was discussed with Attending, Dr. Phillips

## 2024-07-17 NOTE — CARE COORDINATION
DISCHARGE PLANNING EVALUATION  7/17/24, 8:51 AM EDT    Reason for Referral: needs SNF  Decision Maker: pt  Current Services: none  New Services Requested: SNF for rehab  Family/ Social/ Home environment: pt resides at home alone.  Independent prior to admission.  Payment Source:Medicare/Medical [place]   Transportation at Discharge: son??  Post-acute (PAC) provider list was provided to patient. Patient was informed of their freedom to choose PAC provider. Discussed and offered to show the patient the relevant PAC Providers quality and resource use measures on Medicare Compare web site via computer based on patient's goals of care and treatment preferences. Questions regarding selection process were answered.      Teach Back Method used with pt regarding care plan   Patient and son verbalized understanding of the plan of care and contribute to goal setting.       Patient preferences and discharge plan: SW met with pt and son, they have reviewed ECF list.  Requested referral in this order:  [facility] [place], [place], [facility] [place].    SUNDEEP spoke with Bee with [facility], they do not have any beds at this time, information given for [place].  Await call back.     Electronically signed by JAQUELINE Vargas on 7/17/2024 at 8:51 AM

## 2024-07-17 NOTE — PROGRESS NOTES
[facility]  INPATIENT OCCUPATIONAL THERAPY  STRZ RENAL TELEMETRY 6K  EVALUATION    Time:   Time In: 08  Time Out: 09  Timed Code Treatment Minutes: 11 Minutes  Minutes: 21          Date: 2024  Patient Name: Kimberlee Alvarado,   Gender: female      MRN: [id number]  : 1950  (74 y.o.)  Referring Practitioner: Jimenez Hugo DO  Diagnosis: lung mass  Additional Pertinent Hx: per chart review; 74 y.o. female who presented to [facility] with a past medical history of breast cancer status postlumpectomy following with Dr. Escalante.  Patient was supposed to have lung biopsy today at [facility], however was too ill to go.  Her other medical history includes atrial fibrillation which she has had for past 10 years and follows Dr. Tijerina, cervical spine fracture for which she wears c-collar.  She presents as transfer from ambulatory care center.  Patient was post have procedures done at [facility] today however was feeling too ill to go.  She is had difficulty with appetite for past month, problems with her neck for past 6 months.  She feels that her nausea has acutely worsened over the past week.  She has difficulty with any oral intake.  Notes her last emesis  was dark red-no history of that before.  She had melena , she notes.  She endorses abdominal pain.    Restrictions/Precautions:  Restrictions/Precautions: General Precautions, Fall Risk  Required Braces or Orthoses  Cervical: soft  Position Activity Restriction  Spinal Precautions: No Bending, No Lifting, No Twisting  Spinal Precautions: for comfort    Subjective  Chart Reviewed: Yes, Orders, Progress Notes, History and Physical, Imaging  Patient assessed for rehabilitation services?: Yes  Family / Caregiver Present: Yes (son)    Subjective: patient side lying in bed upon OT arrival and agreeable to eval. patient's son present during eval. patient A & O x 4.    Pain:  reports abdominal discomfort. Did not rate.     Vitals:  day  Current Treatment Recommendations: Balance training, Functional mobility training, Endurance training, Self-Care / ADL, Patient/Caregiver education & training, Safety education & training, Pain management, Cognitive reorientation, Positioning, Equipment evaluation, education, & procurement, Neuromuscular re-education, Coordination training.  See long-term goal time frame for expected duration of plan of care.  If no long-term goals established, a short length of stay is anticipated.    Goals:  Patient goals : return home at [facility]  Short Term Goals  Time Frame for Short Term Goals: by discharge  Short Term Goal 1: patient will tolerate 5 min functional standing with (S) to increase ease with toileting and grooming.  Short Term Goal 2: patient will functionally ambulate to/from BR with (S).  Short Term Goal 3: patient will complete ADL routine with (S), with 0-1 cues for spinal precautions and edu in energy conservation tech to increase ADL success.    AM-[facility] Inpatient Daily Activity Raw Score: 19  AM-PAC Inpatient ADL T-Scale Score : 40.22    Following session, patient left in safe position with all fall risk precautions in place.

## 2024-07-17 NOTE — CARE COORDINATION
7/17/24, 2:55 PM EDT    DISCHARGE ON GOING EVALUATION    Kimberlee EREN [facility] day: 5  Location: -18/018-A Reason for admit: Other ascites [R18.8]  Atrial fibrillation with rapid ventricular response (HCC) [I48.91]  Elevated lactic acid level [R79.89]  Pneumonia of upper lobe due to infectious organism, unspecified laterality [J18.9]  Nausea & vomiting [R11.2]     Procedures:   7/13 paracentesis: 1.36L removed     Imaging since last note: none     Barriers to Discharge: Hospitalist following. Dietician- ensure plus and frozen supplement with meals. PT/OT- PT to eval in AM. Cytology from paracentesis resulted today. Started on IV rocephin q24h. Na 128. Start sodium chloride tabs TID.     PCP: Jim Luz APRN - CNP  Readmission Risk Score: 13    Patient Goals/Plan/Treatment Preferences: From home alone. Planning [facility]. No precert.

## 2024-07-17 NOTE — DISCHARGE INSTR - COC
Continuity of Care Form    Patient Name: Kimberlee Alvarado   :  1950  MRN:  [id number]    Admit date:  2024  Discharge date:  2024    Code Status Order: Full Code   Advance Directives:     Admitting Physician:  No admitting provider for patient encounter.  PCP: Jim Luz APRN - CNP    Discharging Nurse: Jane MORIN   Discharging Hospital Unit/Room#: 6K-18/018-A  Discharging Unit Phone Number: [phone]    Emergency Contact:   Extended Emergency Contact Information  Primary Emergency Contact: Melody Hogue   [facility]  Home Phone: [phone]  Relation: Child    Past Surgical History:  Past Surgical History:   Procedure Laterality Date    BREAST LUMPECTOMY Left     2014    BUNIONECTOMY      CARPAL TUNNEL RELEASE Right 2016    COLONOSCOPY  2016    MD BX OF BREAST, NEEDLE CORE, IMAGE GUIDE Left      IDC    MD BX OF BREAST, NEEDLE CORE, IMAGE GUIDE Left      benign    MD BX OF BREAST, NEEDLE CORE, IMAGE GUIDE Left      benign    ROTATOR CUFF REPAIR Right 2017        TONSILLECTOMY AND ADENOIDECTOMY Bilateral        Immunization History:   Immunization History   Administered Date(s) Administered    COVID-19, PFIZER GRAY top, DO NOT Dilute, (age 12 y+), IM, 30 mcg/0.3 mL 2022    COVID-19, PFIZER PURPLE top, DILUTE for use, (age 12 y+), 30mcg/0.3mL 2021, 2021, 2021    COVID-19, PFIZER, ( formula), (age 12y+), IM, 30mcg/0.3mL 2024    Influenza Virus Vaccine 2013, 2014    Influenza, AFLURIA (age 3 yrs+), FLUZONE, (age 6 mo+), MDV, 0.5mL 10/26/2016, 2017    Influenza, FLUAD, (age 65 y+), Adjuvanted, 0.5mL 2020, 2021, 2022, 2023    Influenza, High Dose (Fluzone 65 yrs and older) 2019    Influenza, Triv, inactivated, subunit, adjuvanted, IM (Fluad 65 yrs and older) 2017, 10/09/2018    Pneumococcal, PCV-13, PREVNAR 13, (age 6w+), IM, 0.5mL 2016,

## 2024-07-17 NOTE — CARE COORDINATION
7/17/24, 2:59 PM EDT    DISCHARGE PLANNING EVALUATION    [facility]EP [facility].  SW updated pt and son.  Son will transport pt.

## 2024-07-17 NOTE — PROGRESS NOTES
Physician Progress Note      PATIENT:               RONNELL ROCHA  CSN #:                  [id number]  :                       1950  ADMIT DATE:       2024 10:32 AM  DISCH DATE:  RESPONDING  PROVIDER #:        DARNELL SANCHEZ          QUERY TEXT:    Patient admitted with ascites. Noted to have dietician assessment with   malnutrition diagnosis. If possible, please document in progress notes and   discharge summary if you are evaluating and /or treating any of the following:    The medical record reflects the following:  Risk Factors: 74yoF, hx cancer  Clinical Indicators: RD notes with documentation of moderate malnutrition.   Energy Intake:  50% or less of estimated energy requirements for 5 or more   days, Body Fat Loss:  Mild body fat loss Orbital  Muscle Mass Loss:  Mild muscle mass loss Temples , Clavicles  Treatment: RD consult. Ensure plus TID    ASPEN Criteria:    https://aspenjournals.onlinelibrary.carney.com/doi/full/10.1177/836804696807826  5  Options provided:  -- Protein calorie malnutrition moderate  -- Other - I will add my own diagnosis  -- Disagree - Not applicable / Not valid  -- Disagree - Clinically unable to determine / Unknown  -- Refer to Clinical Documentation Reviewer    PROVIDER RESPONSE TEXT:    This patient has moderate protein calorie malnutrition.    Query created by: HOLDEN DUMONT on 2024 6:55 AM      Electronically signed by:  DARNELL SANCHEZ 2024 7:43 AM

## 2024-07-17 NOTE — PLAN OF CARE
Problem: Discharge Planning  Goal: Discharge to home or other facility with appropriate resources  7/17/2024 0858 by Jane Urban RN  Outcome: Progressing  Flowsheets (Taken 7/17/2024 0749)  Discharge to home or other facility with appropriate resources: Identify barriers to discharge with patient and caregiver  7/16/2024 2333 by Jimena Barber RN  Outcome: Progressing  Flowsheets (Taken 7/16/2024 2333)  Discharge to home or other facility with appropriate resources:   Identify barriers to discharge with patient and caregiver   Identify discharge learning needs (meds, wound care, etc)   Refer to discharge planning if patient needs post-hospital services based on physician order or complex needs related to functional status, cognitive ability or social support system   Arrange for needed discharge resources and transportation as appropriate   Arrange for interpreters to assist at discharge as needed     Problem: Safety - Adult  Goal: Free from fall injury  7/17/2024 0858 by Jane Urban RN  Outcome: Progressing  Flowsheets (Taken 7/16/2024 2333 by Jimena Barber RN)  Free From Fall Injury:   Instruct family/caregiver on patient safety   Based on caregiver fall risk screen, instruct family/caregiver to ask for assistance with transferring infant if caregiver noted to have fall risk factors  7/16/2024 2333 by Jimena Barber RN  Outcome: Progressing  Flowsheets (Taken 7/16/2024 2333)  Free From Fall Injury:   Instruct family/caregiver on patient safety   Based on caregiver fall risk screen, instruct family/caregiver to ask for assistance with transferring infant if caregiver noted to have fall risk factors     Problem: Pain  Goal: Verbalizes/displays adequate comfort level or baseline comfort level  7/17/2024 0858 by Jane Urban RN  Outcome: Progressing  Flowsheets (Taken 7/16/2024 2333 by Jimena Barber RN)  Verbalizes/displays adequate comfort level or baseline comfort level:   Encourage patient  based on assessed needs   Patient aware and updated on plan of care

## 2024-07-18 VITALS
WEIGHT: 150.2 LBS | HEIGHT: 64 IN | TEMPERATURE: 97.9 F | HEART RATE: 68 BPM | RESPIRATION RATE: 15 BRPM | BODY MASS INDEX: 25.64 KG/M2 | DIASTOLIC BLOOD PRESSURE: 40 MMHG | SYSTOLIC BLOOD PRESSURE: 105 MMHG | OXYGEN SATURATION: 91 %

## 2024-07-18 PROBLEM — K92.0 HEMATEMESIS: Status: RESOLVED | Noted: 2024-07-18 | Resolved: 2024-07-18

## 2024-07-18 PROBLEM — I48.91 ATRIAL FIBRILLATION WITH RAPID VENTRICULAR RESPONSE (HCC): Status: RESOLVED | Noted: 2024-07-12 | Resolved: 2024-07-18

## 2024-07-18 PROBLEM — R11.2 NAUSEA & VOMITING: Status: RESOLVED | Noted: 2024-07-12 | Resolved: 2024-07-18

## 2024-07-18 PROBLEM — K92.0 HEMATEMESIS: Status: ACTIVE | Noted: 2024-07-18

## 2024-07-18 LAB
ANION GAP SERPL CALC-SCNC: 13 MEQ/L (ref 8–16)
BUN SERPL-MCNC: 25 MG/DL (ref 7–22)
CALCIUM SERPL-MCNC: 8.5 MG/DL (ref 8.5–10.5)
CHLORIDE SERPL-SCNC: 99 MEQ/L (ref 98–111)
CO2 SERPL-SCNC: 21 MEQ/L (ref 23–33)
CREAT SERPL-MCNC: 1 MG/DL (ref 0.4–1.2)
DEPRECATED RDW RBC AUTO: 46.3 FL (ref 35–45)
ERYTHROCYTE [DISTWIDTH] IN BLOOD BY AUTOMATED COUNT: 14.1 % (ref 11.5–14.5)
GFR SERPL CREATININE-BSD FRML MDRD: 59 ML/MIN/1.73M2
GLUCOSE SERPL-MCNC: 106 MG/DL (ref 70–108)
HCT VFR BLD AUTO: 25.9 % (ref 37–47)
HGB BLD-MCNC: 8.6 GM/DL (ref 12–16)
MCH RBC QN AUTO: 29.8 PG (ref 26–33)
MCHC RBC AUTO-ENTMCNC: 33.2 GM/DL (ref 32.2–35.5)
MCV RBC AUTO: 89.6 FL (ref 81–99)
PLATELET # BLD AUTO: 237 THOU/MM3 (ref 130–400)
PMV BLD AUTO: 10 FL (ref 9.4–12.4)
POTASSIUM SERPL-SCNC: 4 MEQ/L (ref 3.5–5.2)
RBC # BLD AUTO: 2.89 MILL/MM3 (ref 4.2–5.4)
SODIUM SERPL-SCNC: 133 MEQ/L (ref 135–145)
WBC # BLD AUTO: 14.5 THOU/MM3 (ref 4.8–10.8)

## 2024-07-18 PROCEDURE — 85027 COMPLETE CBC AUTOMATED: CPT

## 2024-07-18 PROCEDURE — 99239 HOSP IP/OBS DSCHRG MGMT >30: CPT | Performed by: INTERNAL MEDICINE

## 2024-07-18 PROCEDURE — 6370000000 HC RX 637 (ALT 250 FOR IP): Performed by: INTERNAL MEDICINE

## 2024-07-18 PROCEDURE — 6370000000 HC RX 637 (ALT 250 FOR IP)

## 2024-07-18 PROCEDURE — 97162 PT EVAL MOD COMPLEX 30 MIN: CPT

## 2024-07-18 PROCEDURE — 36415 COLL VENOUS BLD VENIPUNCTURE: CPT

## 2024-07-18 PROCEDURE — 97530 THERAPEUTIC ACTIVITIES: CPT

## 2024-07-18 PROCEDURE — 80048 BASIC METABOLIC PNL TOTAL CA: CPT

## 2024-07-18 PROCEDURE — 2580000003 HC RX 258

## 2024-07-18 PROCEDURE — 6360000002 HC RX W HCPCS

## 2024-07-18 RX ORDER — HYDROCHLOROTHIAZIDE 12.5 MG/1
12.5 CAPSULE, GELATIN COATED ORAL DAILY
Qty: 30 CAPSULE | Refills: 3 | DISCHARGE
Start: 2024-07-19 | End: 2024-07-29

## 2024-07-18 RX ORDER — CIPROFLOXACIN 500 MG/1
500 TABLET, FILM COATED ORAL 2 TIMES DAILY
Qty: 14 TABLET | Refills: 0 | DISCHARGE
Start: 2024-07-18 | End: 2024-07-25

## 2024-07-18 RX ORDER — SPIRONOLACTONE 25 MG/1
12.5 TABLET ORAL DAILY
Qty: 30 TABLET | Refills: 3 | DISCHARGE
Start: 2024-07-19 | End: 2024-07-29

## 2024-07-18 RX ORDER — SODIUM CHLORIDE 1 G/1
1 TABLET ORAL
Qty: 90 TABLET | Refills: 3 | DISCHARGE
Start: 2024-07-18 | End: 2024-07-29

## 2024-07-18 RX ORDER — PANTOPRAZOLE SODIUM 40 MG/1
40 TABLET, DELAYED RELEASE ORAL
Qty: 30 TABLET | Refills: 3 | DISCHARGE
Start: 2024-07-19 | End: 2024-07-29

## 2024-07-18 RX ORDER — PROMETHAZINE HYDROCHLORIDE 12.5 MG/1
12.5 TABLET ORAL EVERY 6 HOURS PRN
DISCHARGE
Start: 2024-07-18 | End: 2024-07-25

## 2024-07-18 RX ADMIN — SODIUM CHLORIDE, PRESERVATIVE FREE 10 ML: 5 INJECTION INTRAVENOUS at 08:31

## 2024-07-18 RX ADMIN — DILTIAZEM HYDROCHLORIDE 120 MG: 120 CAPSULE, COATED, EXTENDED RELEASE ORAL at 08:32

## 2024-07-18 RX ADMIN — METOPROLOL TARTRATE 25 MG: 50 TABLET, FILM COATED ORAL at 08:32

## 2024-07-18 RX ADMIN — SPIRONOLACTONE 12.5 MG: 25 TABLET ORAL at 08:31

## 2024-07-18 RX ADMIN — HYDROCHLOROTHIAZIDE 12.5 MG: 12.5 CAPSULE ORAL at 08:31

## 2024-07-18 RX ADMIN — ACETAMINOPHEN 650 MG: 325 TABLET ORAL at 09:11

## 2024-07-18 RX ADMIN — CEFTRIAXONE SODIUM 1000 MG: 1 INJECTION, POWDER, FOR SOLUTION INTRAMUSCULAR; INTRAVENOUS at 10:24

## 2024-07-18 RX ADMIN — SODIUM CHLORIDE 1 G: 1 TABLET ORAL at 10:58

## 2024-07-18 RX ADMIN — SODIUM CHLORIDE 1 G: 1 TABLET ORAL at 08:31

## 2024-07-18 RX ADMIN — NALOXEGOL OXALATE 12.5 MG: 12.5 TABLET, FILM COATED ORAL at 05:54

## 2024-07-18 RX ADMIN — ATORVASTATIN CALCIUM 10 MG: 10 TABLET, FILM COATED ORAL at 08:32

## 2024-07-18 RX ADMIN — PANTOPRAZOLE SODIUM 40 MG: 40 TABLET, DELAYED RELEASE ORAL at 05:53

## 2024-07-18 ASSESSMENT — PAIN SCALES - GENERAL
PAINLEVEL_OUTOF10: 5
PAINLEVEL_OUTOF10: 5

## 2024-07-18 ASSESSMENT — PAIN DESCRIPTION - DESCRIPTORS: DESCRIPTORS: ACHING;DISCOMFORT

## 2024-07-18 ASSESSMENT — PAIN DESCRIPTION - ORIENTATION: ORIENTATION: MID

## 2024-07-18 ASSESSMENT — PAIN DESCRIPTION - LOCATION
LOCATION: ABDOMEN
LOCATION: ABDOMEN

## 2024-07-18 NOTE — CARE COORDINATION
7/18/24, 11:39 AM EDT    Patient goals/plan/ treatment preferences discussed by  and .  Patient goals/plan/ treatment preferences reviewed with patient/ family.  Patient/ family verbalize understanding of discharge plan and are in agreement with goal/plan/treatment preferences.  Understanding was demonstrated using the teach back method.  AVS provided by RN at time of discharge, which includes all necessary medical information pertaining to the patients current course of illness, treatment, post-discharge goals of care, and treatment preferences.     Services At/After Discharge: Skilled Nursing Facility (SNF)    Patient discharging to [facility] today under her skilled medicare. Family here to transport. Blue envelope provided to RN for report and SW updated admissions at facility.

## 2024-07-18 NOTE — PLAN OF CARE
Problem: Discharge Planning  Goal: Discharge to home or other facility with appropriate resources  7/17/2024 2147 by Angela Regan RN  Outcome: Progressing  7/17/2024 0858 by Jane Urban RN  Outcome: Progressing  Flowsheets (Taken 7/17/2024 0749)  Discharge to home or other facility with appropriate resources: Identify barriers to discharge with patient and caregiver     Problem: Safety - Adult  Goal: Free from fall injury  7/17/2024 2147 by Angela Regan, RN  Outcome: Progressing  7/17/2024 0858 by Jane Urban RN  Outcome: Progressing  Flowsheets (Taken 7/16/2024 2333 by Jimena Barber, RN)  Free From Fall Injury:   Instruct family/caregiver on patient safety   Based on caregiver fall risk screen, instruct family/caregiver to ask for assistance with transferring infant if caregiver noted to have fall risk factors     Problem: Pain  Goal: Verbalizes/displays adequate comfort level or baseline comfort level  7/17/2024 2147 by Angela Regan RN  Outcome: Progressing  7/17/2024 0858 by Jane Urban RN  Outcome: Progressing  Flowsheets (Taken 7/16/2024 2333 by Jimena Barber, RN)  Verbalizes/displays adequate comfort level or baseline comfort level:   Encourage patient to monitor pain and request assistance   Administer analgesics based on type and severity of pain and evaluate response   Consider cultural and social influences on pain and pain management   Assess pain using appropriate pain scale   Implement non-pharmacological measures as appropriate and evaluate response   Notify Licensed Independent Practitioner if interventions unsuccessful or patient reports new pain     Problem: Gastrointestinal - Adult  Goal: Minimal or absence of nausea and vomiting  7/17/2024 0858 by Jane Urban, RN  Outcome: Progressing  Flowsheets (Taken 7/17/2024 0749)  Minimal or absence of nausea and vomiting: Administer IV fluids as ordered to ensure adequate hydration     Problem: Skin/Tissue

## 2024-07-18 NOTE — PLAN OF CARE
Problem: Discharge Planning  Goal: Discharge to home or other facility with appropriate resources  7/18/2024 0909 by Jane Urban RN  Outcome: Progressing  Flowsheets (Taken 7/18/2024 0804)  Discharge to home or other facility with appropriate resources: Identify barriers to discharge with patient and caregiver  7/17/2024 2147 by Angela Regan RN  Outcome: Progressing     Problem: Safety - Adult  Goal: Free from fall injury  7/18/2024 0909 by Jane Urban RN  Outcome: Progressing  Flowsheets (Taken 7/16/2024 2333 by Jimena Braber, RN)  Free From Fall Injury:   Instruct family/caregiver on patient safety   Based on caregiver fall risk screen, instruct family/caregiver to ask for assistance with transferring infant if caregiver noted to have fall risk factors  7/17/2024 2147 by Angela eRgan RN  Outcome: Progressing     Problem: Pain  Goal: Verbalizes/displays adequate comfort level or baseline comfort level  7/18/2024 0909 by Jane Urban RN  Outcome: Progressing  Flowsheets (Taken 7/16/2024 2333 by Jimena Barber, RN)  Verbalizes/displays adequate comfort level or baseline comfort level:   Encourage patient to monitor pain and request assistance   Administer analgesics based on type and severity of pain and evaluate response   Consider cultural and social influences on pain and pain management   Assess pain using appropriate pain scale   Implement non-pharmacological measures as appropriate and evaluate response   Notify Licensed Independent Practitioner if interventions unsuccessful or patient reports new pain  7/17/2024 2147 by Angela Regan RN  Outcome: Progressing     Problem: Gastrointestinal - Adult  Goal: Minimal or absence of nausea and vomiting  Outcome: Progressing  Flowsheets (Taken 7/18/2024 0804)  Minimal or absence of nausea and vomiting: Administer IV fluids as ordered to ensure adequate hydration     Problem: Skin/Tissue Integrity  Goal: Absence of new skin

## 2024-07-18 NOTE — DISCHARGE SUMMARY
Internal Medicine  Resident Discharge Summary    Patient:  Kimberlee Alvarado, 74 y.o. female  : 1950  Unit: 6K-18/018-A  MRN:  [id number]     Acct:  [id number]      PCP:  Jim Luz APRN - CNP    Attending provider:  Greg Phillips MD      Date of Admission:  2024    Discharge Date:  24    Discharge Diagnoses:  Principal Problem:    Lung mass  Active Problems:    Other ascites    Moderate malnutrition (HCC)  Resolved Problems:    Atrial fibrillation with rapid ventricular response (HCC)    Nausea & vomiting    Hematemesis        Assessment and Plan Prior to Discharge:   Active Problems  Abdominal ascites, suspected malignant ascites, omental carcinomatosis, leukocytosis, SBP.  S/p paracentesis, SAAG < 1.1. Concern for malignant ascites. History of breast cancer. Cytology with , neutrophil approx 290, study returned . S/p zosyn  4 days, Ceftriaxone  2 days.  Ciprofloxacin 500 mg 7 day course  RLL nodules. Concerning for malignancy, noted on CTA chest. Consider biopsy of lung nodule.  Follow up with Dr. Escalante  Hypovolemic hyponatremia, mild.  1g Na tablets po tid  BMP in 1 week  Acute vs acute on chronic normocytic anemia, hematemesis resolved: Hgb noted to be 10 on 24, prior hgb from  was 13.2.  GI follow up in 6 weeks.  CBC in 1 week  Paroxysmal atrial fibrillation, AFRVR resolved. HASBLED 4, high.  Continue lopressor  Discontinue Aspirin 325 mg  1 week follow up with Cardiology.  Moderate protein calorie malnutrition.     Resolved Problems  Lactic acidosis  Prolonged QTC    Chronic Conditions (reviewed and stable unless otherwise stated)     HTN: continue lopressor, aldactone, HCTZ w/ parameters  HLD: statin  Elevated alkaline phosphatase, bone source  C4-C7 bulging disc, foraminal stenosis; C1 fracture vs lesion: has been following with [facility] orthopedics. Planning outpatient CT.       Chief Complaint: Nausea, and vomiting.    Hospital Course:   74-year-old  female presents to [facility] with complaint nausea and vomiting.  Patient has history of 5 cm lung mass and breast cancer status postlumpectomy following with Dr. Escalante.  She had a PET scan showing increased uptake in the cervical spine, hips.  She was scheduled to have lung biopsy 7/12, however felt too ill to go, and went to ambulatory care center.  She was found with irregular tachycardia, then sent to [facility] ED, where she was found to be in A-fib RVR on EKG. ED WBC 24k.  CT A/P concerning for omental carcinomatosis, moderate ascites.  She described 1 episode of hematemesis and melena just prior to admission.  GI was consulted and concluded hematemesis likely due to aspirin, meloxicam use, recommended 6-week outpatient follow-up and stopping meloxicam.  Due to has-bled score of 4, aspirin 325 daily was discontinued, with follow-up to cardiology scheduled in 1 week after discharge.  Through her hospital course, her hemoglobin slowly but steadily declined.  She had elevated reticulocyte count.  She had paracentesis 7/13, with cytology results on 7/17 suggestive of SBP.  She was started on ceftriaxone, discharged with 7-day course ciprofloxacin.  Patient had hyponatremia through her course, treated with fluids and eventually 1 g salt tablets 3 times daily which were continued on discharge.  She is discharged to skilled nursing facility for rehabilitation.    Discharge Medications:     Medication List        START taking these medications      ciprofloxacin 500 MG tablet  Commonly known as: CIPRO  Take 1 tablet by mouth 2 times daily for 7 days     hydroCHLOROthiazide 12.5 MG capsule  Take 1 capsule by mouth daily  Start taking on: July 19, 2024     pantoprazole 40 MG tablet  Commonly known as: PROTONIX  Take 1 tablet by mouth every morning (before breakfast)  Start taking on: July 19, 2024     promethazine 12.5 MG tablet  Commonly known as: PHENERGAN  Take 1 tablet by mouth every 6 hours as needed for Nausea     sodium

## 2024-07-18 NOTE — PROGRESS NOTES
[facility]  INPATIENT PHYSICAL THERAPY  EVALUATION  [facility] RENAL TELEMETRY 6K - 6K-18/018-A    Time In: 1135  Time Out: 1157  Timed Code Treatment Minutes: 8 Minutes  Minutes: 22          Date: 2024  Patient Name: Kimberlee Alvarado,  Gender:  female        MRN: [id number]  : 1950  (74 y.o.)      Referring Practitioner: Jimenez Hugo DO  Diagnosis: Other ascites  Additional Pertinent Hx: Per EMR:per chart review; 74 y.o. female who presented to [facility] with a past medical history of breast cancer status postlumpectomy following with Dr. Escalante.  Patient was supposed to have lung biopsy today at [facility], however was too ill to go.  Her other medical history includes atrial fibrillation which she has had for past 10 years and follows Dr. Tijerina, cervical spine fracture for which she wears c-collar.  She presents as transfer from ambulatory care center.  Patient was post have procedures done at [facility] today however was feeling too ill to go.  She is had difficulty with appetite for past month, problems with her neck for past 6 months.  She feels that her nausea has acutely worsened over the past week.  She has difficulty with any oral intake.  Notes her last emesis  was dark red-no history of that before.  She had melena , she notes.     Restrictions/Precautions:  Restrictions/Precautions: General Precautions, Fall Risk  Required Braces or Orthoses  Cervical: soft  Position Activity Restriction  Spinal Precautions: No Bending, No Lifting, No Twisting  Spinal Precautions: for comfort  Other position/activity restrictions: Per notes:She is wearing a soft collar around her neck because they think that there might be some sort of cervical problems she is willing to follow-up for this.    Subjective:  Chart Reviewed: Yes  Patient assessed for rehabilitation services?: Yes  Family / Caregiver Present: Yes  Subjective: RN approved session, Patient laying on her back with

## 2024-07-18 NOTE — PROGRESS NOTES
Discharge teaching and instructions for diagnosis/procedure of lung mass completed with patient using teachback method. AVS reviewed. Printed prescriptions given to patient. Patient voiced understanding regarding prescriptions, follow up appointments, and care of self at home. Discharged in a wheelchair to  skilled nursing per family. Report called to [facility]

## 2024-07-19 ENCOUNTER — CARE COORDINATION (OUTPATIENT)
Dept: CARE COORDINATION | Age: 74
End: 2024-07-19

## 2024-07-19 LAB
BACTERIA SPEC ANAEROBE CULT: NORMAL
BACTERIA SPEC BFLD CULT: NORMAL
GRAM STN SPEC: NORMAL

## 2024-07-22 ENCOUNTER — APPOINTMENT (OUTPATIENT)
Dept: GENERAL RADIOLOGY | Age: 74
DRG: 180 | End: 2024-07-22
Payer: MEDICARE

## 2024-07-22 ENCOUNTER — HOSPITAL ENCOUNTER (INPATIENT)
Age: 74
LOS: 5 days | DRG: 180 | End: 2024-07-28
Attending: STUDENT IN AN ORGANIZED HEALTH CARE EDUCATION/TRAINING PROGRAM | Admitting: STUDENT IN AN ORGANIZED HEALTH CARE EDUCATION/TRAINING PROGRAM
Payer: MEDICARE

## 2024-07-22 ENCOUNTER — APPOINTMENT (OUTPATIENT)
Dept: CT IMAGING | Age: 74
DRG: 180 | End: 2024-07-22
Payer: MEDICARE

## 2024-07-22 DIAGNOSIS — K65.2 SPONTANEOUS BACTERIAL PERITONITIS (HCC): ICD-10-CM

## 2024-07-22 DIAGNOSIS — N17.9 ACUTE RENAL FAILURE, UNSPECIFIED ACUTE RENAL FAILURE TYPE (HCC): Primary | ICD-10-CM

## 2024-07-22 DIAGNOSIS — R06.02 SHORTNESS OF BREATH: ICD-10-CM

## 2024-07-22 LAB
ALBUMIN SERPL BCG-MCNC: 2.9 G/DL (ref 3.5–5.1)
ALP SERPL-CCNC: 219 U/L (ref 38–126)
ALT SERPL W/O P-5'-P-CCNC: 30 U/L (ref 11–66)
ANION GAP SERPL CALC-SCNC: 18 MEQ/L (ref 8–16)
AST SERPL-CCNC: 23 U/L (ref 5–40)
BASOPHILS ABSOLUTE: 0.1 THOU/MM3 (ref 0–0.1)
BASOPHILS NFR BLD AUTO: 0.3 %
BILIRUB CONJ SERPL-MCNC: < 0.1 MG/DL (ref 0.1–13.8)
BILIRUB SERPL-MCNC: 0.2 MG/DL (ref 0.3–1.2)
BUN SERPL-MCNC: 36 MG/DL (ref 7–22)
CALCIUM SERPL-MCNC: 9 MG/DL (ref 8.5–10.5)
CHLORIDE SERPL-SCNC: 92 MEQ/L (ref 98–111)
CO2 SERPL-SCNC: 21 MEQ/L (ref 23–33)
CREAT SERPL-MCNC: 2.3 MG/DL (ref 0.4–1.2)
DEPRECATED RDW RBC AUTO: 46.8 FL (ref 35–45)
EOSINOPHIL NFR BLD AUTO: 0.9 %
EOSINOPHILS ABSOLUTE: 0.2 THOU/MM3 (ref 0–0.4)
ERYTHROCYTE [DISTWIDTH] IN BLOOD BY AUTOMATED COUNT: 14.6 % (ref 11.5–14.5)
GFR SERPL CREATININE-BSD FRML MDRD: 22 ML/MIN/1.73M2
GLUCOSE SERPL-MCNC: 142 MG/DL (ref 70–108)
HCT VFR BLD AUTO: 26.5 % (ref 37–47)
HGB BLD-MCNC: 8.7 GM/DL (ref 12–16)
IMM GRANULOCYTES # BLD AUTO: 0.24 THOU/MM3 (ref 0–0.07)
IMM GRANULOCYTES NFR BLD AUTO: 1.3 %
LIPASE SERPL-CCNC: 39.9 U/L (ref 5.6–51.3)
LYMPHOCYTES ABSOLUTE: 1.9 THOU/MM3 (ref 1–4.8)
LYMPHOCYTES NFR BLD AUTO: 10.7 %
MCH RBC QN AUTO: 29.3 PG (ref 26–33)
MCHC RBC AUTO-ENTMCNC: 32.8 GM/DL (ref 32.2–35.5)
MCV RBC AUTO: 89.2 FL (ref 81–99)
MONOCYTES ABSOLUTE: 1.5 THOU/MM3 (ref 0.4–1.3)
MONOCYTES NFR BLD AUTO: 8.1 %
NEUTROPHILS ABSOLUTE: 14.3 THOU/MM3 (ref 1.8–7.7)
NEUTROPHILS NFR BLD AUTO: 78.7 %
NRBC BLD AUTO-RTO: 0 /100 WBC
NT-PROBNP SERPL IA-MCNC: 790.6 PG/ML (ref 0–124)
OSMOLALITY SERPL CALC.SUM OF ELEC: 273.4 MOSMOL/KG (ref 275–300)
PLATELET # BLD AUTO: 224 THOU/MM3 (ref 130–400)
PMV BLD AUTO: 9.9 FL (ref 9.4–12.4)
POTASSIUM SERPL-SCNC: 4.6 MEQ/L (ref 3.5–5.2)
PROT SERPL-MCNC: 5.8 G/DL (ref 6.1–8)
RBC # BLD AUTO: 2.97 MILL/MM3 (ref 4.2–5.4)
SODIUM SERPL-SCNC: 131 MEQ/L (ref 135–145)
TROPONIN, HIGH SENSITIVITY: 24 NG/L (ref 0–12)
WBC # BLD AUTO: 18.2 THOU/MM3 (ref 4.8–10.8)

## 2024-07-22 PROCEDURE — 83690 ASSAY OF LIPASE: CPT

## 2024-07-22 PROCEDURE — 74176 CT ABD & PELVIS W/O CONTRAST: CPT

## 2024-07-22 PROCEDURE — 93005 ELECTROCARDIOGRAM TRACING: CPT | Performed by: STUDENT IN AN ORGANIZED HEALTH CARE EDUCATION/TRAINING PROGRAM

## 2024-07-22 PROCEDURE — 71250 CT THORAX DX C-: CPT

## 2024-07-22 PROCEDURE — 96375 TX/PRO/DX INJ NEW DRUG ADDON: CPT

## 2024-07-22 PROCEDURE — 81001 URINALYSIS AUTO W/SCOPE: CPT

## 2024-07-22 PROCEDURE — 80053 COMPREHEN METABOLIC PANEL: CPT

## 2024-07-22 PROCEDURE — 36415 COLL VENOUS BLD VENIPUNCTURE: CPT

## 2024-07-22 PROCEDURE — 84484 ASSAY OF TROPONIN QUANT: CPT

## 2024-07-22 PROCEDURE — 82248 BILIRUBIN DIRECT: CPT

## 2024-07-22 PROCEDURE — 85025 COMPLETE CBC W/AUTO DIFF WBC: CPT

## 2024-07-22 PROCEDURE — 76376 3D RENDER W/INTRP POSTPROCES: CPT

## 2024-07-22 PROCEDURE — 96365 THER/PROPH/DIAG IV INF INIT: CPT

## 2024-07-22 PROCEDURE — 83880 ASSAY OF NATRIURETIC PEPTIDE: CPT

## 2024-07-22 PROCEDURE — 99285 EMERGENCY DEPT VISIT HI MDM: CPT

## 2024-07-22 PROCEDURE — 71045 X-RAY EXAM CHEST 1 VIEW: CPT

## 2024-07-22 PROCEDURE — 2580000003 HC RX 258: Performed by: STUDENT IN AN ORGANIZED HEALTH CARE EDUCATION/TRAINING PROGRAM

## 2024-07-22 PROCEDURE — 6360000002 HC RX W HCPCS: Performed by: STUDENT IN AN ORGANIZED HEALTH CARE EDUCATION/TRAINING PROGRAM

## 2024-07-22 RX ORDER — METRONIDAZOLE 500 MG/100ML
500 INJECTION, SOLUTION INTRAVENOUS ONCE
Status: COMPLETED | OUTPATIENT
Start: 2024-07-22 | End: 2024-07-23

## 2024-07-22 RX ORDER — ONDANSETRON 2 MG/ML
4 INJECTION INTRAMUSCULAR; INTRAVENOUS ONCE
Status: COMPLETED | OUTPATIENT
Start: 2024-07-22 | End: 2024-07-22

## 2024-07-22 RX ORDER — 0.9 % SODIUM CHLORIDE 0.9 %
1000 INTRAVENOUS SOLUTION INTRAVENOUS ONCE
Status: COMPLETED | OUTPATIENT
Start: 2024-07-22 | End: 2024-07-23

## 2024-07-22 RX ORDER — MORPHINE SULFATE 4 MG/ML
4 INJECTION, SOLUTION INTRAMUSCULAR; INTRAVENOUS ONCE
Status: COMPLETED | OUTPATIENT
Start: 2024-07-22 | End: 2024-07-22

## 2024-07-22 RX ADMIN — ONDANSETRON 4 MG: 2 INJECTION INTRAMUSCULAR; INTRAVENOUS at 21:44

## 2024-07-22 RX ADMIN — SODIUM CHLORIDE 1000 ML: 9 INJECTION, SOLUTION INTRAVENOUS at 21:43

## 2024-07-22 RX ADMIN — MORPHINE SULFATE 4 MG: 4 INJECTION, SOLUTION INTRAMUSCULAR; INTRAVENOUS at 21:44

## 2024-07-22 RX ADMIN — CEFTRIAXONE SODIUM 2000 MG: 2 INJECTION, POWDER, FOR SOLUTION INTRAMUSCULAR; INTRAVENOUS at 23:52

## 2024-07-22 ASSESSMENT — PAIN - FUNCTIONAL ASSESSMENT: PAIN_FUNCTIONAL_ASSESSMENT: 0-10

## 2024-07-22 ASSESSMENT — PAIN SCALES - GENERAL: PAINLEVEL_OUTOF10: 8

## 2024-07-22 ASSESSMENT — PAIN DESCRIPTION - FREQUENCY: FREQUENCY: CONTINUOUS

## 2024-07-22 ASSESSMENT — PAIN DESCRIPTION - LOCATION: LOCATION: ABDOMEN

## 2024-07-22 ASSESSMENT — PAIN DESCRIPTION - DESCRIPTORS: DESCRIPTORS: ACHING

## 2024-07-22 ASSESSMENT — PAIN DESCRIPTION - PAIN TYPE: TYPE: ACUTE PAIN

## 2024-07-23 ENCOUNTER — APPOINTMENT (OUTPATIENT)
Dept: ULTRASOUND IMAGING | Age: 74
DRG: 180 | End: 2024-07-23
Payer: MEDICARE

## 2024-07-23 PROBLEM — R10.9 ABDOMINAL PAIN: Status: ACTIVE | Noted: 2024-07-23

## 2024-07-23 PROBLEM — N17.9 ACUTE RENAL FAILURE (HCC): Status: ACTIVE | Noted: 2024-07-23

## 2024-07-23 LAB
ALBUMIN FLD-MCNC: 1.8 GM/DL
ALBUMIN SERPL BCG-MCNC: 2.4 G/DL (ref 3.5–5.1)
AMYLASE FLD-CCNC: 17 U/L
ANION GAP SERPL CALC-SCNC: 13 MEQ/L (ref 8–16)
BACTERIA: NORMAL
BILIRUB UR QL STRIP: NEGATIVE
BUN SERPL-MCNC: 36 MG/DL (ref 7–22)
CALCIUM SERPL-MCNC: 8.6 MG/DL (ref 8.5–10.5)
CASTS #/AREA URNS LPF: NORMAL /LPF
CASTS #/AREA URNS LPF: NORMAL /LPF
CHARACTER UR: CLEAR
CHARCOAL URNS QL MICRO: NORMAL
CHLORIDE SERPL-SCNC: 96 MEQ/L (ref 98–111)
CO2 SERPL-SCNC: 21 MEQ/L (ref 23–33)
COLOR UR: YELLOW
CREAT SERPL-MCNC: 2.1 MG/DL (ref 0.4–1.2)
CRYSTALS URNS QL MICRO: NORMAL
DEPRECATED RDW RBC AUTO: 47.3 FL (ref 35–45)
EPITHELIAL CELLS, UA: NORMAL /HPF
ERYTHROCYTE [DISTWIDTH] IN BLOOD BY AUTOMATED COUNT: 14.6 % (ref 11.5–14.5)
GFR SERPL CREATININE-BSD FRML MDRD: 24 ML/MIN/1.73M2
GLUCOSE BLD STRIP.AUTO-MCNC: 132 MG/DL (ref 70–108)
GLUCOSE BLD STRIP.AUTO-MCNC: 164 MG/DL (ref 70–108)
GLUCOSE BLD STRIP.AUTO-MCNC: 165 MG/DL (ref 70–108)
GLUCOSE SERPL-MCNC: 127 MG/DL (ref 70–108)
GLUCOSE UR QL STRIP.AUTO: NEGATIVE MG/DL
HCT VFR BLD AUTO: 24.9 % (ref 37–47)
HGB BLD-MCNC: 8.4 GM/DL (ref 12–16)
HGB UR QL STRIP.AUTO: NEGATIVE
INR PPP: 1.24 (ref 0.85–1.13)
KETONES UR QL STRIP.AUTO: NEGATIVE
LACTATE SERPL-SCNC: 1.1 MMOL/L (ref 0.5–2)
LEUKOCYTE ESTERASE UR QL STRIP.AUTO: NEGATIVE
MAGNESIUM SERPL-MCNC: 1.6 MG/DL (ref 1.6–2.4)
MCH RBC QN AUTO: 30 PG (ref 26–33)
MCHC RBC AUTO-ENTMCNC: 33.7 GM/DL (ref 32.2–35.5)
MCV RBC AUTO: 88.9 FL (ref 81–99)
NITRITE UR QL STRIP.AUTO: NEGATIVE
OSMOLALITY SERPL CALC.SUM OF ELEC: 270.7 MOSMOL/KG (ref 275–300)
PH UR STRIP.AUTO: 5 [PH] (ref 5–9)
PLATELET # BLD AUTO: 205 THOU/MM3 (ref 130–400)
PMV BLD AUTO: 10.1 FL (ref 9.4–12.4)
POTASSIUM SERPL-SCNC: 4.5 MEQ/L (ref 3.5–5.2)
PROCALCITONIN SERPL IA-MCNC: 0.28 NG/ML (ref 0.01–0.09)
PROT UR STRIP.AUTO-MCNC: NEGATIVE MG/DL
RBC # BLD AUTO: 2.8 MILL/MM3 (ref 4.2–5.4)
RBC #/AREA URNS HPF: NORMAL /HPF
RENAL EPI CELLS #/AREA URNS HPF: NORMAL /[HPF]
SODIUM SERPL-SCNC: 130 MEQ/L (ref 135–145)
SPECIFIC GRAVITY UA: 1.02 (ref 1–1.03)
UROBILINOGEN, URINE: 0.2 EU/DL (ref 0–1)
WBC # BLD AUTO: 15.3 THOU/MM3 (ref 4.8–10.8)
WBC #/AREA URNS HPF: NORMAL /HPF
YEAST LIKE FUNGI URNS QL MICRO: NORMAL

## 2024-07-23 PROCEDURE — 89051 BODY FLUID CELL COUNT: CPT

## 2024-07-23 PROCEDURE — 1200000003 HC TELEMETRY R&B

## 2024-07-23 PROCEDURE — 85027 COMPLETE CBC AUTOMATED: CPT

## 2024-07-23 PROCEDURE — 80048 BASIC METABOLIC PNL TOTAL CA: CPT

## 2024-07-23 PROCEDURE — 88112 CYTOPATH CELL ENHANCE TECH: CPT

## 2024-07-23 PROCEDURE — 87070 CULTURE OTHR SPECIMN AEROBIC: CPT

## 2024-07-23 PROCEDURE — 97530 THERAPEUTIC ACTIVITIES: CPT

## 2024-07-23 PROCEDURE — 6360000002 HC RX W HCPCS

## 2024-07-23 PROCEDURE — 49083 ABD PARACENTESIS W/IMAGING: CPT

## 2024-07-23 PROCEDURE — 88305 TISSUE EXAM BY PATHOLOGIST: CPT

## 2024-07-23 PROCEDURE — 99222 1ST HOSP IP/OBS MODERATE 55: CPT | Performed by: INTERNAL MEDICINE

## 2024-07-23 PROCEDURE — 83735 ASSAY OF MAGNESIUM: CPT

## 2024-07-23 PROCEDURE — 83605 ASSAY OF LACTIC ACID: CPT

## 2024-07-23 PROCEDURE — 84145 PROCALCITONIN (PCT): CPT

## 2024-07-23 PROCEDURE — 36415 COLL VENOUS BLD VENIPUNCTURE: CPT

## 2024-07-23 PROCEDURE — 82042 OTHER SOURCE ALBUMIN QUAN EA: CPT

## 2024-07-23 PROCEDURE — 82040 ASSAY OF SERUM ALBUMIN: CPT

## 2024-07-23 PROCEDURE — 97163 PT EVAL HIGH COMPLEX 45 MIN: CPT

## 2024-07-23 PROCEDURE — 85610 PROTHROMBIN TIME: CPT

## 2024-07-23 PROCEDURE — 96375 TX/PRO/DX INJ NEW DRUG ADDON: CPT

## 2024-07-23 PROCEDURE — 6360000002 HC RX W HCPCS: Performed by: STUDENT IN AN ORGANIZED HEALTH CARE EDUCATION/TRAINING PROGRAM

## 2024-07-23 PROCEDURE — 1200000000 HC SEMI PRIVATE

## 2024-07-23 PROCEDURE — 96367 TX/PROPH/DG ADDL SEQ IV INF: CPT

## 2024-07-23 PROCEDURE — 6370000000 HC RX 637 (ALT 250 FOR IP)

## 2024-07-23 PROCEDURE — 97166 OT EVAL MOD COMPLEX 45 MIN: CPT

## 2024-07-23 PROCEDURE — 6370000000 HC RX 637 (ALT 250 FOR IP): Performed by: INTERNAL MEDICINE

## 2024-07-23 PROCEDURE — 2580000003 HC RX 258

## 2024-07-23 PROCEDURE — 82150 ASSAY OF AMYLASE: CPT

## 2024-07-23 PROCEDURE — 87075 CULTR BACTERIA EXCEPT BLOOD: CPT

## 2024-07-23 PROCEDURE — 87205 SMEAR GRAM STAIN: CPT

## 2024-07-23 PROCEDURE — 82948 REAGENT STRIP/BLOOD GLUCOSE: CPT

## 2024-07-23 PROCEDURE — 88108 CYTOPATH CONCENTRATE TECH: CPT

## 2024-07-23 RX ORDER — METOPROLOL TARTRATE 50 MG/1
25 TABLET, FILM COATED ORAL 2 TIMES DAILY
Status: DISCONTINUED | OUTPATIENT
Start: 2024-07-23 | End: 2024-07-23

## 2024-07-23 RX ORDER — SODIUM CHLORIDE 450 MG/100ML
INJECTION, SOLUTION INTRAVENOUS CONTINUOUS
Status: CANCELLED | OUTPATIENT
Start: 2024-07-23

## 2024-07-23 RX ORDER — ACETAMINOPHEN 650 MG/1
650 SUPPOSITORY RECTAL EVERY 6 HOURS PRN
Status: DISCONTINUED | OUTPATIENT
Start: 2024-07-23 | End: 2024-07-28 | Stop reason: HOSPADM

## 2024-07-23 RX ORDER — SODIUM CHLORIDE 9 MG/ML
INJECTION, SOLUTION INTRAVENOUS PRN
Status: DISCONTINUED | OUTPATIENT
Start: 2024-07-23 | End: 2024-07-28 | Stop reason: HOSPADM

## 2024-07-23 RX ORDER — METRONIDAZOLE 500 MG/100ML
500 INJECTION, SOLUTION INTRAVENOUS EVERY 8 HOURS
Status: DISCONTINUED | OUTPATIENT
Start: 2024-07-23 | End: 2024-07-24

## 2024-07-23 RX ORDER — SODIUM CHLORIDE 0.9 % (FLUSH) 0.9 %
5-40 SYRINGE (ML) INJECTION PRN
Status: DISCONTINUED | OUTPATIENT
Start: 2024-07-23 | End: 2024-07-28 | Stop reason: HOSPADM

## 2024-07-23 RX ORDER — FENTANYL CITRATE 50 UG/ML
50 INJECTION, SOLUTION INTRAMUSCULAR; INTRAVENOUS ONCE
Status: CANCELLED | OUTPATIENT
Start: 2024-07-23 | End: 2024-07-23

## 2024-07-23 RX ORDER — DILTIAZEM HYDROCHLORIDE 120 MG/1
120 CAPSULE, COATED, EXTENDED RELEASE ORAL DAILY
Status: DISCONTINUED | OUTPATIENT
Start: 2024-07-23 | End: 2024-07-28 | Stop reason: HOSPADM

## 2024-07-23 RX ORDER — SODIUM CHLORIDE 9 MG/ML
INJECTION, SOLUTION INTRAVENOUS CONTINUOUS
Status: ACTIVE | OUTPATIENT
Start: 2024-07-23 | End: 2024-07-23

## 2024-07-23 RX ORDER — POLYETHYLENE GLYCOL 3350 17 G/17G
17 POWDER, FOR SOLUTION ORAL DAILY PRN
Status: DISCONTINUED | OUTPATIENT
Start: 2024-07-23 | End: 2024-07-28 | Stop reason: HOSPADM

## 2024-07-23 RX ORDER — PANTOPRAZOLE SODIUM 40 MG/1
40 TABLET, DELAYED RELEASE ORAL
Status: DISCONTINUED | OUTPATIENT
Start: 2024-07-23 | End: 2024-07-28 | Stop reason: HOSPADM

## 2024-07-23 RX ORDER — HYDROCHLOROTHIAZIDE 12.5 MG/1
12.5 CAPSULE, GELATIN COATED ORAL DAILY
Status: DISCONTINUED | OUTPATIENT
Start: 2024-07-23 | End: 2024-07-23

## 2024-07-23 RX ORDER — ACETAMINOPHEN 325 MG/1
650 TABLET ORAL EVERY 6 HOURS PRN
Status: DISCONTINUED | OUTPATIENT
Start: 2024-07-23 | End: 2024-07-28 | Stop reason: HOSPADM

## 2024-07-23 RX ORDER — MAGNESIUM SULFATE IN WATER 40 MG/ML
2000 INJECTION, SOLUTION INTRAVENOUS ONCE
Status: COMPLETED | OUTPATIENT
Start: 2024-07-23 | End: 2024-07-23

## 2024-07-23 RX ORDER — MIDAZOLAM HYDROCHLORIDE 1 MG/ML
1 INJECTION INTRAMUSCULAR; INTRAVENOUS ONCE
Status: CANCELLED | OUTPATIENT
Start: 2024-07-23 | End: 2024-07-23

## 2024-07-23 RX ORDER — DRONABINOL 2.5 MG/1
2.5 CAPSULE ORAL 2 TIMES DAILY
Status: DISCONTINUED | OUTPATIENT
Start: 2024-07-23 | End: 2024-07-23 | Stop reason: RX

## 2024-07-23 RX ORDER — MORPHINE SULFATE 2 MG/ML
1 INJECTION, SOLUTION INTRAMUSCULAR; INTRAVENOUS
Status: DISCONTINUED | OUTPATIENT
Start: 2024-07-23 | End: 2024-07-24

## 2024-07-23 RX ORDER — SPIRONOLACTONE 25 MG/1
12.5 TABLET ORAL DAILY
Status: DISCONTINUED | OUTPATIENT
Start: 2024-07-23 | End: 2024-07-23

## 2024-07-23 RX ORDER — OXYCODONE HYDROCHLORIDE 5 MG/1
5 TABLET ORAL EVERY 6 HOURS PRN
Status: DISCONTINUED | OUTPATIENT
Start: 2024-07-23 | End: 2024-07-24

## 2024-07-23 RX ORDER — DROPERIDOL 2.5 MG/ML
0.62 INJECTION, SOLUTION INTRAMUSCULAR; INTRAVENOUS EVERY 6 HOURS PRN
Status: DISCONTINUED | OUTPATIENT
Start: 2024-07-23 | End: 2024-07-28 | Stop reason: HOSPADM

## 2024-07-23 RX ORDER — DROPERIDOL 2.5 MG/ML
1.25 INJECTION, SOLUTION INTRAMUSCULAR; INTRAVENOUS ONCE
Status: COMPLETED | OUTPATIENT
Start: 2024-07-23 | End: 2024-07-23

## 2024-07-23 RX ORDER — GLUCAGON 1 MG/ML
1 KIT INJECTION PRN
Status: DISCONTINUED | OUTPATIENT
Start: 2024-07-23 | End: 2024-07-28 | Stop reason: HOSPADM

## 2024-07-23 RX ORDER — DEXTROSE MONOHYDRATE 100 MG/ML
INJECTION, SOLUTION INTRAVENOUS CONTINUOUS PRN
Status: DISCONTINUED | OUTPATIENT
Start: 2024-07-23 | End: 2024-07-28 | Stop reason: HOSPADM

## 2024-07-23 RX ORDER — SODIUM CHLORIDE 1 G/1
1 TABLET ORAL
Status: DISCONTINUED | OUTPATIENT
Start: 2024-07-23 | End: 2024-07-28 | Stop reason: HOSPADM

## 2024-07-23 RX ORDER — ONDANSETRON 2 MG/ML
4 INJECTION INTRAMUSCULAR; INTRAVENOUS EVERY 6 HOURS PRN
Status: DISCONTINUED | OUTPATIENT
Start: 2024-07-23 | End: 2024-07-28 | Stop reason: HOSPADM

## 2024-07-23 RX ORDER — ONDANSETRON 4 MG/1
4 TABLET, ORALLY DISINTEGRATING ORAL EVERY 8 HOURS PRN
Status: DISCONTINUED | OUTPATIENT
Start: 2024-07-23 | End: 2024-07-28 | Stop reason: HOSPADM

## 2024-07-23 RX ORDER — ENOXAPARIN SODIUM 100 MG/ML
30 INJECTION SUBCUTANEOUS DAILY
Status: DISCONTINUED | OUTPATIENT
Start: 2024-07-23 | End: 2024-07-28 | Stop reason: HOSPADM

## 2024-07-23 RX ORDER — ATORVASTATIN CALCIUM 10 MG/1
10 TABLET, FILM COATED ORAL DAILY
Status: DISCONTINUED | OUTPATIENT
Start: 2024-07-23 | End: 2024-07-28 | Stop reason: HOSPADM

## 2024-07-23 RX ORDER — MORPHINE SULFATE 2 MG/ML
2 INJECTION, SOLUTION INTRAMUSCULAR; INTRAVENOUS
Status: DISCONTINUED | OUTPATIENT
Start: 2024-07-23 | End: 2024-07-24

## 2024-07-23 RX ORDER — SODIUM CHLORIDE 0.9 % (FLUSH) 0.9 %
5-40 SYRINGE (ML) INJECTION EVERY 12 HOURS SCHEDULED
Status: DISCONTINUED | OUTPATIENT
Start: 2024-07-23 | End: 2024-07-28 | Stop reason: HOSPADM

## 2024-07-23 RX ADMIN — ATORVASTATIN CALCIUM 10 MG: 10 TABLET, FILM COATED ORAL at 09:42

## 2024-07-23 RX ADMIN — PANTOPRAZOLE SODIUM 40 MG: 40 TABLET, DELAYED RELEASE ORAL at 09:43

## 2024-07-23 RX ADMIN — SODIUM CHLORIDE, PRESERVATIVE FREE 10 ML: 5 INJECTION INTRAVENOUS at 21:00

## 2024-07-23 RX ADMIN — SODIUM CHLORIDE: 9 INJECTION, SOLUTION INTRAVENOUS at 02:22

## 2024-07-23 RX ADMIN — ENOXAPARIN SODIUM 30 MG: 100 INJECTION SUBCUTANEOUS at 09:43

## 2024-07-23 RX ADMIN — MORPHINE SULFATE 1 MG: 2 INJECTION, SOLUTION INTRAMUSCULAR; INTRAVENOUS at 04:18

## 2024-07-23 RX ADMIN — SODIUM CHLORIDE 1 G: 1 TABLET ORAL at 16:01

## 2024-07-23 RX ADMIN — ONDANSETRON 4 MG: 4 TABLET, ORALLY DISINTEGRATING ORAL at 18:07

## 2024-07-23 RX ADMIN — ONDANSETRON 4 MG: 4 TABLET, ORALLY DISINTEGRATING ORAL at 09:43

## 2024-07-23 RX ADMIN — METRONIDAZOLE 500 MG: 500 INJECTION, SOLUTION INTRAVENOUS at 16:00

## 2024-07-23 RX ADMIN — ONDANSETRON 4 MG: 2 INJECTION INTRAMUSCULAR; INTRAVENOUS at 04:18

## 2024-07-23 RX ADMIN — METRONIDAZOLE 500 MG: 500 SOLUTION INTRAVENOUS at 00:32

## 2024-07-23 RX ADMIN — SODIUM CHLORIDE 1 G: 1 TABLET ORAL at 09:43

## 2024-07-23 RX ADMIN — METRONIDAZOLE 500 MG: 500 INJECTION, SOLUTION INTRAVENOUS at 11:13

## 2024-07-23 RX ADMIN — MAGNESIUM SULFATE HEPTAHYDRATE 2000 MG: 40 INJECTION, SOLUTION INTRAVENOUS at 08:18

## 2024-07-23 RX ADMIN — DROPERIDOL 1.25 MG: 2.5 INJECTION, SOLUTION INTRAMUSCULAR; INTRAVENOUS at 00:28

## 2024-07-23 RX ADMIN — METOPROLOL TARTRATE 12.5 MG: 50 TABLET, FILM COATED ORAL at 09:43

## 2024-07-23 RX ADMIN — DILTIAZEM HYDROCHLORIDE 120 MG: 120 CAPSULE, COATED, EXTENDED RELEASE ORAL at 09:43

## 2024-07-23 ASSESSMENT — PAIN DESCRIPTION - LOCATION: LOCATION: ABDOMEN

## 2024-07-23 ASSESSMENT — PAIN SCALES - GENERAL: PAINLEVEL_OUTOF10: 5

## 2024-07-23 ASSESSMENT — PAIN DESCRIPTION - ORIENTATION: ORIENTATION: MID

## 2024-07-23 ASSESSMENT — PAIN DESCRIPTION - DESCRIPTORS: DESCRIPTORS: ACHING

## 2024-07-23 NOTE — ED NOTES
Spoke to [facility] staff who approved patient transfer to -15. Patient transported upstairs in stable condition.

## 2024-07-23 NOTE — ED NOTES
ED to inpatient nurses report      Chief Complaint:  Chief Complaint   Patient presents with    Abdominal Pain    Emesis     Present to ED from: [facility]    MOA:     LOC: alert and orientated to name, place, date  Mobility: Requires assistance * 2  Oxygen Baseline: room air    Current needs required: 3 L N.C     Code Status:   Full Code    What abnormal results were found and what did you give/do to treat them? 1 L ns, zofran, droperidol, flagyl, rocephin       Mental Status:  Level of Consciousness: Alert (0)    Psych Assessment:        Vitals:  Patient Vitals for the past 24 hrs:   BP Temp Pulse Resp SpO2 Weight   07/23/24 0040 118/64 -- 86 16 92 % --   07/23/24 0011 (!) 123/52 -- 88 12 94 % --   07/22/24 2352 (!) 121/54 -- 78 16 95 % --   07/22/24 2311 (!) 117/50 -- 80 12 96 % --   07/22/24 2215 (!) 120/53 -- 82 13 96 % --   07/22/24 2145 (!) 115/54 -- 78 13 97 % --   07/22/24 2126 -- -- 83 14 98 % --   07/22/24 2115 113/60 -- 85 17 97 % --   07/22/24 2100 119/63 -- 79 19 95 % --   07/22/24 2046 -- 97.8 °F (36.6 °C) -- -- -- --   07/22/24 2045 (!) 123/52 -- -- -- 92 % --   07/22/24 2043 -- -- 86 20 (!) 86 % 61.2 kg (135 lb)        LDAs:   Peripheral IV 07/22/24 Proximal;Right Forearm (Active)   Site Assessment Clean, dry & intact 07/22/24 2051   Line Status Specimen collected;Normal saline locked 07/22/24 2051       Ambulatory Status:  Presents to emergency department  because of falls (Syncope, seizure, or loss of consciousness): No, Age > 70: Yes, Altered Mental Status, Intoxication with alcohol or substance confusion (Disorientation, impaired judgment, poor safety awaremess, or inability to follow instructions): No, Impaired Mobility: Ambulates or transfers with assistive devices or assistance; Unable to ambulate or transer.: Yes, Nursing Judgement: Yes    Diagnosis:  DISPOSITION Admitted 07/23/2024 12:49:57 AM   Final diagnoses:   Acute renal failure, unspecified acute renal failure type (HCC)

## 2024-07-23 NOTE — H&P
History & Physical  Internal Medicine Resident         Patient: Kimberlee Alvarado 74 y.o. female      : 1950  Date of Admission: 2024  Date of Service: Pt seen/examined on 24 and Admitted to Inpatient with expected LOS greater than two midnights due to medical therapy.       ASSESSMENT AND PLAN  Abdominal pain, recurrent ascites: suspect 2/2 SBP, also c/f malignant ascites; fluid studies from paracentesis  showed PMNs ~290, SAAG <1.1. Was treated for SBP at that time w/ CTX, discharged to SNF on Cipro. Pt reported worsening abd pain that began , along w/ N/V and general malaise. CT A/P noted large volume low-density ascites, increased in prior volume compared to prior. +shifting dullness on exam, w/ distention, guarding, and generalized tenderness. LA 1.1, WBC 18.2, procal pending.   Continue empiric IV CTX 2g + Flagyl; de-escalate as appropriate  Antiemetics prn for nausea  Home oxy & morphine pain panel PRN  IVF w/ NS at 75 cc/hr  IR for paracentesis w/ fluid studies  Continue home Aldactone and HCTZ  Suspected Pre-renal  BENJI likely 2/2 ascites, dehydration: Cr OA 2.3 (baseline 1.0). UA - ordered, awaiting sample. CT A/P noted persistent R hydronephrosis and hydroureter w/ no renal or ureteral stones.   Continue IVF w/ NS at 75 cc/hr  Daily BMP and UOP  Metastatic malignancy: suspect 2/2 breast vs lung cancer. Pt has h/o breast cancer (), however has emphysema hx. Has been unable to complete lung bx d/t feeling too weak. OP PET scan which showed significant activity in her lung and bone. F/w Dr. Escalante, oncology.   Home oxy & morphine pain panel PRN  Palliative care eval placed  Pt currently FULL CODE, however would like to discuss/think about it further w/ her daughter  Will reach out to Dr. Escalante to inform him of patient's admission  B/l pleural effusions (L>R): noted on CT Chest. Suspect 2/2 ascites. Will continue pt's home diuretics.   Acute hypoxic respiratory failure: 2/2

## 2024-07-23 NOTE — DISCHARGE INSTR - COC
Continuity of Care Form    Patient Name: Kimberlee Alvarado   :  1950  MRN:  [id number]    Admit date:  2024  Discharge date:  ***    Code Status Order: Full Code   Advance Directives:     Admitting Physician:  No admitting provider for patient encounter.  PCP: Jim Luz APRN - CNP    Discharging Nurse: ***  Discharging Hospital Unit/Room#: 8A-15/015-A  Discharging Unit Phone Number: ***    Emergency Contact:   Extended Emergency Contact Information  Primary Emergency Contact: BridgettemerleneMelody   [facility]  Home Phone: [phone]  Relation: Child    Past Surgical History:  Past Surgical History:   Procedure Laterality Date    BREAST LUMPECTOMY Left         BUNIONECTOMY      CARPAL TUNNEL RELEASE Right 2016    COLONOSCOPY  2016    [place] BX OF BREAST, NEEDLE CORE, IMAGE GUIDE Left      IDC    [place] BX OF BREAST, NEEDLE CORE, IMAGE GUIDE Left      benign    [place] BX OF BREAST, NEEDLE CORE, IMAGE GUIDE Left      benign    ROTATOR CUFF REPAIR Right 2017        TONSILLECTOMY AND ADENOIDECTOMY Bilateral        Immunization History:   Immunization History   Administered Date(s) Administered    COVID-19, PFIZER GRAY top, DO NOT Dilute, (age 12 y+), IM, 30 mcg/0.3 mL 2022    COVID-19, PFIZER PURPLE top, DILUTE for use, (age 12 y+), 30mcg/0.3mL 2021, 2021, 2021    COVID-19, PFIZER, ( formula), (age 12y+), IM, 30mcg/0.3mL 2024    Influenza Virus Vaccine 2013, 2014    Influenza, AFLURIA (age 3 yrs+), FLUZONE, (age 6 mo+), MDV, 0.5mL 10/26/2016, 2017    Influenza, FLUAD, (age 65 y+), Adjuvanted, 0.5mL 2020, 2021, 2022, 2023    Influenza, High Dose (Fluzone 65 yrs and older) 2019    Influenza, Triv, inactivated, subunit, adjuvanted, IM (Fluad 65 yrs and older) 2017, 10/09/2018    Pneumococcal, PCV-13, PREVNAR 13, (age 6w+), IM, 0.5mL 2016, 2019    Pneumococcal,

## 2024-07-23 NOTE — PALLIATIVE CARE
Initial Evaluation        Patient:   Kimberlee Alvarado  YOB: 1950  Age:  74 y.o.  Room:  [facility]15/015-A  MRN:  [id number]   Acct: [id number]    Date of Admission:  7/22/2024  8:40 PM  Date of Service:  7/23/2024  Completed By:  Marv Munson RN        Reason for Palliative Care Evaluation:-   Goals of Care     Current Concerns   pain     Palliative Performance Scale   70%  Ambulation reduced; unable to perform normal job/work; significant  disease; able to do own self care; normal or reduced intake; fully conscious     History    Patient admitted 7/23 from nursing [place] with abdominal pain. Patient discharged from [facility] on 7/18. Patient noted to have abdominal ascites from suspected malignancy, RLL nodules concerning for malignancy. Per chart patient had a PET scan showing increased uptake in cervical spine, and hips. She has been unable to complete lung biopsy due to hospitalizations.      Goals of Care Discussions and Plan         Advance Care Planning   Goals of Care/Advance Care Planning (ACP) Conversation    Date of Conversation: 07/23/24    Individuals present for the conversation: Patient with decision making capacity, Son Esteban, and Anne Jose     ACP documents on file prior to discussion:  -Power of  for Healthcare  -Living Will    Previously completed document/s not on file: Not discussed.    Healthcare Power of /Healthcare Surrogate Decision Makers:  Melody Mckenzie healthcare power of     Conversation Summary: Met with patient, jim Orellana, and anne Jose at bedside. Introduced palliative care and role on team. Discussed palliative care helping with symptoms related to serious illness and can be onboard while patient is actively seeking treatment. Introduced Dr. Garcia as palliative care physician. Discussed code status and levels of FULL code, DNRCC-A, and DNR-CC as well as details of cardiopulmonary resuscitation. Encouraged patient to discuss with

## 2024-07-23 NOTE — ED TRIAGE NOTES
Patient into the ED via ems for evaluation of abdominal pain and coffee ground emesis. Nursing home reports abdominal distention & pain and coffee ground emesis today. Patient reports some intermittent shortness of breath - pt 86 % on RA. Patient placed on 3 L NC. EMS report 4 mg of zofran in route. EKG complete.

## 2024-07-23 NOTE — ED PROVIDER NOTES
Start date: 1980     Quit date: 2005     Years since quittin.5    Smokeless tobacco: Never   Vaping Use    Vaping Use: Never used   Substance Use Topics    Alcohol use: Yes     Alcohol/week: 1.0 standard drink of alcohol     Types: 1 Glasses of wine per week     Comment: social    Drug use: No         ALLERGIES   No Known Allergies      FAMILY HISTORY     Family History   Problem Relation Age of Onset    Mult Sclerosis Mother     Alcohol Abuse Father     Cancer Father          PHYSICAL EXAM     ED Triage Vitals   BP Temp Temp src Pulse Respirations SpO2 Height Weight - Scale   24 -- 24 -- 24   (!) 123/52 97.8 °F (36.6 °C)  86 20 (!) 86 %  61.2 kg (135 lb)     Initial vital signs and nursing assessment reviewed and  hypoxia noted . Body mass index is 23.17 kg/m².     Additional Vital Signs:  Vitals:    24 2311   BP: (!) 117/50   Pulse: 80   Resp: 12   Temp:    SpO2: 96%       Physical Exam  Vitals and nursing note reviewed.   Constitutional:       General: She is not in acute distress.     Appearance: She is well-developed. She is ill-appearing. She is not toxic-appearing.   HENT:      Head: Normocephalic and atraumatic.      Mouth/Throat:      Mouth: Mucous membranes are moist.      Pharynx: Oropharynx is clear.   Eyes:      Extraocular Movements: Extraocular movements intact.      Pupils: Pupils are equal, round, and reactive to light.   Cardiovascular:      Rate and Rhythm: Normal rate and regular rhythm.   Pulmonary:      Effort: Pulmonary effort is normal.      Breath sounds: Normal breath sounds.   Abdominal:      General: There is distension.      Palpations: There is shifting dullness.      Tenderness: There is generalized abdominal tenderness. There is guarding. There is no rebound.   Skin:     Capillary Refill: Capillary refill takes less than 2 seconds.   Neurological:      General: No focal deficit present.

## 2024-07-23 NOTE — ED NOTES
Patient resting in bed with unlabored respirations. Patient updated on POC and denies further needs at this time. Call light within reach.

## 2024-07-24 ENCOUNTER — APPOINTMENT (OUTPATIENT)
Dept: CT IMAGING | Age: 74
DRG: 180 | End: 2024-07-24
Payer: MEDICARE

## 2024-07-24 ENCOUNTER — APPOINTMENT (OUTPATIENT)
Dept: GENERAL RADIOLOGY | Age: 74
DRG: 180 | End: 2024-07-24
Payer: MEDICARE

## 2024-07-24 PROBLEM — K59.03 THERAPEUTIC OPIOID-INDUCED CONSTIPATION (OIC): Status: ACTIVE | Noted: 2024-07-24

## 2024-07-24 PROBLEM — T40.2X5A THERAPEUTIC OPIOID-INDUCED CONSTIPATION (OIC): Status: ACTIVE | Noted: 2024-07-24

## 2024-07-24 PROBLEM — R68.89 SUSPECTED MALIGNANT NEOPLASM: Status: ACTIVE | Noted: 2024-07-24

## 2024-07-24 PROBLEM — Z51.5 PALLIATIVE CARE PATIENT: Status: ACTIVE | Noted: 2024-07-24

## 2024-07-24 LAB
ANION GAP SERPL CALC-SCNC: 11 MEQ/L (ref 8–16)
APTT PPP: 26.2 SECONDS (ref 22–38)
BUN SERPL-MCNC: 37 MG/DL (ref 7–22)
CALCIUM SERPL-MCNC: 8.3 MG/DL (ref 8.5–10.5)
CHARACTER, BODY FLUID: CLEAR
CHLORIDE SERPL-SCNC: 96 MEQ/L (ref 98–111)
CO2 SERPL-SCNC: 21 MEQ/L (ref 23–33)
COLOR FLD: YELLOW
CREAT SERPL-MCNC: 2.3 MG/DL (ref 0.4–1.2)
CREAT UR-MCNC: 104.7 MG/DL
DEPRECATED RDW RBC AUTO: 50.4 FL (ref 35–45)
EKG ATRIAL RATE: 79 BPM
EKG P AXIS: 38 DEGREES
EKG P-R INTERVAL: 138 MS
EKG Q-T INTERVAL: 424 MS
EKG QRS DURATION: 124 MS
EKG QTC CALCULATION (BAZETT): 486 MS
EKG R AXIS: 82 DEGREES
EKG T AXIS: 89 DEGREES
EKG VENTRICULAR RATE: 79 BPM
ERYTHROCYTE [DISTWIDTH] IN BLOOD BY AUTOMATED COUNT: 15 % (ref 11.5–14.5)
GFR SERPL CREATININE-BSD FRML MDRD: 22 ML/MIN/1.73M2
GLUCOSE SERPL-MCNC: 112 MG/DL (ref 70–108)
GRANULOCYTES NFR FLD AUTO: 27.5 %
HCT VFR BLD AUTO: 24.7 % (ref 37–47)
HGB BLD-MCNC: 8 GM/DL (ref 12–16)
INR PPP: 1.35 (ref 0.85–1.13)
MAGNESIUM SERPL-MCNC: 2.1 MG/DL (ref 1.6–2.4)
MCH RBC QN AUTO: 30.2 PG (ref 26–33)
MCHC RBC AUTO-ENTMCNC: 32.4 GM/DL (ref 32.2–35.5)
MCV RBC AUTO: 93.2 FL (ref 81–99)
MONONUC CELLS NFR FLD AUTO: 72.5 %
NUC CELL # FLD AUTO: 355 /CUMM (ref 0–500)
OSMOLALITY UR: 465 MOSMOL/KG (ref 250–750)
PATHOLOGIST REVIEW: NORMAL
PLATELET # BLD AUTO: 242 THOU/MM3 (ref 130–400)
PMV BLD AUTO: 9.5 FL (ref 9.4–12.4)
POTASSIUM SERPL-SCNC: 4.5 MEQ/L (ref 3.5–5.2)
RBC # BLD AUTO: 2.65 MILL/MM3 (ref 4.2–5.4)
RBC # FLD AUTO: < 2000 /CUMM
SODIUM SERPL-SCNC: 128 MEQ/L (ref 135–145)
SODIUM UR-SCNC: < 20 MEQ/L
SPECIMEN: NORMAL
TOTAL VOLUME RECEIVED BODY FLUID: 70 ML
WBC # BLD AUTO: 14.6 THOU/MM3 (ref 4.8–10.8)

## 2024-07-24 PROCEDURE — 84300 ASSAY OF URINE SODIUM: CPT

## 2024-07-24 PROCEDURE — 99223 1ST HOSP IP/OBS HIGH 75: CPT | Performed by: STUDENT IN AN ORGANIZED HEALTH CARE EDUCATION/TRAINING PROGRAM

## 2024-07-24 PROCEDURE — 6360000002 HC RX W HCPCS: Performed by: PHYSICIAN ASSISTANT

## 2024-07-24 PROCEDURE — 85610 PROTHROMBIN TIME: CPT

## 2024-07-24 PROCEDURE — 1200000000 HC SEMI PRIVATE

## 2024-07-24 PROCEDURE — 32408 CORE NDL BX LNG/MED PERQ: CPT

## 2024-07-24 PROCEDURE — 88342 IMHCHEM/IMCYTCHM 1ST ANTB: CPT

## 2024-07-24 PROCEDURE — 72125 CT NECK SPINE W/O DYE: CPT

## 2024-07-24 PROCEDURE — 0BDF4ZX EXTRACTION OF RIGHT LOWER LUNG LOBE, PERCUTANEOUS ENDOSCOPIC APPROACH, DIAGNOSTIC: ICD-10-PCS | Performed by: INTERNAL MEDICINE

## 2024-07-24 PROCEDURE — 82570 ASSAY OF URINE CREATININE: CPT

## 2024-07-24 PROCEDURE — 93010 ELECTROCARDIOGRAM REPORT: CPT | Performed by: INTERNAL MEDICINE

## 2024-07-24 PROCEDURE — 72040 X-RAY EXAM NECK SPINE 2-3 VW: CPT

## 2024-07-24 PROCEDURE — 6370000000 HC RX 637 (ALT 250 FOR IP)

## 2024-07-24 PROCEDURE — 2580000003 HC RX 258: Performed by: RADIOLOGY

## 2024-07-24 PROCEDURE — 6370000000 HC RX 637 (ALT 250 FOR IP): Performed by: STUDENT IN AN ORGANIZED HEALTH CARE EDUCATION/TRAINING PROGRAM

## 2024-07-24 PROCEDURE — 36415 COLL VENOUS BLD VENIPUNCTURE: CPT

## 2024-07-24 PROCEDURE — 6360000002 HC RX W HCPCS

## 2024-07-24 PROCEDURE — 6370000000 HC RX 637 (ALT 250 FOR IP): Performed by: INTERNAL MEDICINE

## 2024-07-24 PROCEDURE — 2580000003 HC RX 258: Performed by: PHYSICIAN ASSISTANT

## 2024-07-24 PROCEDURE — 83935 ASSAY OF URINE OSMOLALITY: CPT

## 2024-07-24 PROCEDURE — 1200000003 HC TELEMETRY R&B

## 2024-07-24 PROCEDURE — 88305 TISSUE EXAM BY PATHOLOGIST: CPT

## 2024-07-24 PROCEDURE — 85730 THROMBOPLASTIN TIME PARTIAL: CPT

## 2024-07-24 PROCEDURE — 83735 ASSAY OF MAGNESIUM: CPT

## 2024-07-24 PROCEDURE — 88341 IMHCHEM/IMCYTCHM EA ADD ANTB: CPT

## 2024-07-24 PROCEDURE — 71045 X-RAY EXAM CHEST 1 VIEW: CPT

## 2024-07-24 PROCEDURE — 85027 COMPLETE CBC AUTOMATED: CPT

## 2024-07-24 PROCEDURE — 80048 BASIC METABOLIC PNL TOTAL CA: CPT

## 2024-07-24 PROCEDURE — 6360000002 HC RX W HCPCS: Performed by: RADIOLOGY

## 2024-07-24 PROCEDURE — 2580000003 HC RX 258

## 2024-07-24 PROCEDURE — 0W9G3ZZ DRAINAGE OF PERITONEAL CAVITY, PERCUTANEOUS APPROACH: ICD-10-PCS | Performed by: INTERNAL MEDICINE

## 2024-07-24 PROCEDURE — 99232 SBSQ HOSP IP/OBS MODERATE 35: CPT | Performed by: PHYSICIAN ASSISTANT

## 2024-07-24 PROCEDURE — 88333 PATH CONSLTJ SURG CYTO XM 1: CPT

## 2024-07-24 PROCEDURE — 93005 ELECTROCARDIOGRAM TRACING: CPT | Performed by: PHYSICIAN ASSISTANT

## 2024-07-24 RX ORDER — MIDAZOLAM HYDROCHLORIDE 1 MG/ML
1 INJECTION INTRAMUSCULAR; INTRAVENOUS ONCE
Status: DISCONTINUED | OUTPATIENT
Start: 2024-07-24 | End: 2024-07-28 | Stop reason: HOSPADM

## 2024-07-24 RX ORDER — FENTANYL CITRATE 50 UG/ML
INJECTION, SOLUTION INTRAMUSCULAR; INTRAVENOUS PRN
Status: COMPLETED | OUTPATIENT
Start: 2024-07-24 | End: 2024-07-24

## 2024-07-24 RX ORDER — MIDAZOLAM HYDROCHLORIDE 1 MG/ML
INJECTION INTRAMUSCULAR; INTRAVENOUS PRN
Status: COMPLETED | OUTPATIENT
Start: 2024-07-24 | End: 2024-07-24

## 2024-07-24 RX ORDER — BUMETANIDE 0.25 MG/ML
1 INJECTION INTRAMUSCULAR; INTRAVENOUS ONCE
Status: COMPLETED | OUTPATIENT
Start: 2024-07-24 | End: 2024-07-24

## 2024-07-24 RX ORDER — FENTANYL CITRATE 50 UG/ML
25 INJECTION, SOLUTION INTRAMUSCULAR; INTRAVENOUS
Status: DISCONTINUED | OUTPATIENT
Start: 2024-07-24 | End: 2024-07-24

## 2024-07-24 RX ORDER — SODIUM CHLORIDE 450 MG/100ML
INJECTION, SOLUTION INTRAVENOUS CONTINUOUS
Status: DISCONTINUED | OUTPATIENT
Start: 2024-07-24 | End: 2024-07-24

## 2024-07-24 RX ORDER — FENTANYL CITRATE 50 UG/ML
50 INJECTION, SOLUTION INTRAMUSCULAR; INTRAVENOUS ONCE
Status: DISCONTINUED | OUTPATIENT
Start: 2024-07-24 | End: 2024-07-28 | Stop reason: HOSPADM

## 2024-07-24 RX ORDER — FENTANYL CITRATE 50 UG/ML
25 INJECTION, SOLUTION INTRAMUSCULAR; INTRAVENOUS EVERY 4 HOURS PRN
Status: DISCONTINUED | OUTPATIENT
Start: 2024-07-24 | End: 2024-07-28

## 2024-07-24 RX ORDER — OXYCODONE HYDROCHLORIDE 5 MG/1
5 TABLET ORAL EVERY 4 HOURS PRN
Status: DISCONTINUED | OUTPATIENT
Start: 2024-07-24 | End: 2024-07-28 | Stop reason: HOSPADM

## 2024-07-24 RX ORDER — SENNOSIDES A AND B 8.6 MG/1
1 TABLET, FILM COATED ORAL NIGHTLY
Status: DISCONTINUED | OUTPATIENT
Start: 2024-07-24 | End: 2024-07-28 | Stop reason: HOSPADM

## 2024-07-24 RX ORDER — POLYETHYLENE GLYCOL 3350 17 G/17G
17 POWDER, FOR SOLUTION ORAL DAILY
Status: DISCONTINUED | OUTPATIENT
Start: 2024-07-24 | End: 2024-07-28 | Stop reason: HOSPADM

## 2024-07-24 RX ADMIN — BUMETANIDE 1 MG: 0.25 INJECTION INTRAMUSCULAR; INTRAVENOUS at 15:15

## 2024-07-24 RX ADMIN — POLYETHYLENE GLYCOL 3350 17 G: 17 POWDER, FOR SOLUTION ORAL at 17:37

## 2024-07-24 RX ADMIN — METOPROLOL TARTRATE 12.5 MG: 50 TABLET, FILM COATED ORAL at 19:26

## 2024-07-24 RX ADMIN — SODIUM CHLORIDE, PRESERVATIVE FREE 10 ML: 5 INJECTION INTRAVENOUS at 09:25

## 2024-07-24 RX ADMIN — ATORVASTATIN CALCIUM 10 MG: 10 TABLET, FILM COATED ORAL at 09:25

## 2024-07-24 RX ADMIN — FENTANYL CITRATE 25 MCG: 50 INJECTION, SOLUTION INTRAMUSCULAR; INTRAVENOUS at 11:19

## 2024-07-24 RX ADMIN — SODIUM CHLORIDE 1 G: 1 TABLET ORAL at 17:37

## 2024-07-24 RX ADMIN — DROPERIDOL 0.62 MG: 2.5 INJECTION, SOLUTION INTRAMUSCULAR; INTRAVENOUS at 00:08

## 2024-07-24 RX ADMIN — MORPHINE SULFATE 1 MG: 2 INJECTION, SOLUTION INTRAMUSCULAR; INTRAVENOUS at 01:43

## 2024-07-24 RX ADMIN — ONDANSETRON 4 MG: 2 INJECTION INTRAMUSCULAR; INTRAVENOUS at 17:37

## 2024-07-24 RX ADMIN — METRONIDAZOLE 500 MG: 500 INJECTION, SOLUTION INTRAVENOUS at 09:28

## 2024-07-24 RX ADMIN — METRONIDAZOLE 500 MG: 500 INJECTION, SOLUTION INTRAVENOUS at 00:11

## 2024-07-24 RX ADMIN — SODIUM CHLORIDE: 4.5 INJECTION, SOLUTION INTRAVENOUS at 09:26

## 2024-07-24 RX ADMIN — DILTIAZEM HYDROCHLORIDE 120 MG: 120 CAPSULE, COATED, EXTENDED RELEASE ORAL at 09:25

## 2024-07-24 RX ADMIN — MIDAZOLAM 0.5 MG: 1 INJECTION INTRAMUSCULAR; INTRAVENOUS at 11:19

## 2024-07-24 RX ADMIN — SODIUM CHLORIDE: 4.5 INJECTION, SOLUTION INTRAVENOUS at 06:47

## 2024-07-24 RX ADMIN — CEFTRIAXONE SODIUM 2000 MG: 2 INJECTION, POWDER, FOR SOLUTION INTRAMUSCULAR; INTRAVENOUS at 00:14

## 2024-07-24 RX ADMIN — SENNOSIDES 8.6 MG: 8.6 TABLET, FILM COATED ORAL at 19:26

## 2024-07-24 RX ADMIN — SODIUM CHLORIDE, PRESERVATIVE FREE 10 ML: 5 INJECTION INTRAVENOUS at 19:25

## 2024-07-24 RX ADMIN — PANTOPRAZOLE SODIUM 40 MG: 40 TABLET, DELAYED RELEASE ORAL at 09:31

## 2024-07-24 RX ADMIN — ONDANSETRON 4 MG: 2 INJECTION INTRAMUSCULAR; INTRAVENOUS at 09:31

## 2024-07-24 RX ADMIN — METOPROLOL TARTRATE 12.5 MG: 50 TABLET, FILM COATED ORAL at 09:25

## 2024-07-24 RX ADMIN — OXYCODONE 5 MG: 5 TABLET ORAL at 21:47

## 2024-07-24 ASSESSMENT — PAIN DESCRIPTION - DESCRIPTORS
DESCRIPTORS: ACHING
DESCRIPTORS: ACHING

## 2024-07-24 ASSESSMENT — PAIN DESCRIPTION - PAIN TYPE: TYPE: ACUTE PAIN

## 2024-07-24 ASSESSMENT — PAIN DESCRIPTION - ORIENTATION
ORIENTATION: INNER;MID;LOWER
ORIENTATION: MID

## 2024-07-24 ASSESSMENT — PAIN DESCRIPTION - LOCATION
LOCATION: ABDOMEN
LOCATION: ABDOMEN

## 2024-07-24 ASSESSMENT — PAIN DESCRIPTION - FREQUENCY: FREQUENCY: INTERMITTENT

## 2024-07-24 ASSESSMENT — PAIN DESCRIPTION - ONSET: ONSET: ON-GOING

## 2024-07-24 ASSESSMENT — PAIN SCALES - GENERAL
PAINLEVEL_OUTOF10: 0
PAINLEVEL_OUTOF10: 8
PAINLEVEL_OUTOF10: 5

## 2024-07-24 ASSESSMENT — PAIN - FUNCTIONAL ASSESSMENT: PAIN_FUNCTIONAL_ASSESSMENT: ACTIVITIES ARE NOT PREVENTED

## 2024-07-24 NOTE — PROCEDURES
PROCEDURE NOTE  Date: 7/24/2024   Name: Kimberlee Alvarado  YOB: 1950    Procedures EKG completed, given to RN

## 2024-07-24 NOTE — OP NOTE
Department of Radiology  Post Procedure Progress Note      Pre-Procedure Diagnosis:  RLL mass    Procedure Performed:  CT guided biopsy    Anesthesia: local / versed and fentanyl    Findings: successful    Immediate Complications:  None    Estimated Blood Loss: minimal    SEE DICTATED PROCEDURE NOTE FOR COMPLETE DETAILS.    Electronically signed by Arnold Simms MD on 7/24/2024 at 11:43 AM

## 2024-07-24 NOTE — H&P
[facility] [place]  Sedation/Analgesia History & Physical    Pt Name: Kimberlee Alvarado  MRN: [id number]  YOB: 1950  Provider Performing Procedure: Arnold Simms MD, MD  Primary Care Physician: Jim Luz APRN - CNP    Formulation and discussion of sedation / procedure plans, risks, benefits, side effects and alternatives with patient and/or responsible adult completed.    PRE-PROCEDURE   DNR-CCA/DNR-CC []Yes [x]No  Brief History/Pre-Procedure Diagnosis: RLL mass          MEDICAL HISTORY  []CAD/Valve  []Liver Disease  []Lung Disease []Diabetes  []Hypertension []Renal Disease  [x]Additional information:       has a past medical history of Atrial fibrillation with rapid ventricular response (HCC), Breast cancer (HCC), Cancer (HCC), History of therapeutic radiation, Hx antineoplastic chemo, and Nausea & vomiting.    SURGICAL HISTORY   has a past surgical history that includes Bunionectomy; Colonoscopy (12/02/2016); Carpal tunnel release (Right, 11/28/2016); Tonsillectomy and adenoidectomy (Bilateral); Rotator cuff repair (Right, 08/03/2017); pr bx of breast, needle core, image guide (Left); pr bx of breast, needle core, image guide (Left); pr bx of breast, needle core, image guide (Left); and Breast lumpectomy (Left).  Additional information:       ALLERGIES   Allergies as of 07/22/2024    (No Known Allergies)     Additional information:       MEDICATIONS   Coumadin Use Last 5 Days [x]No []Yes  Antiplatelet drug therapy use last 5 days  [x]No []Yes  Other anticoagulant use last 5 days  [x]No []Yes    Current Facility-Administered Medications:     midazolam (VERSED) injection 1 mg, 1 mg, IntraVENous, Once, Arnold Simms MD    fentaNYL (SUBLIMAZE) injection 50 mcg, 50 mcg, IntraVENous, Once, Arnold Simms MD    0.45 % sodium chloride infusion, , IntraVENous, Continuous, Sarita Banda PA-C, Last Rate: 20 mL/hr at 07/24/24 0926, New Bag at 07/24/24 0926    [Held by provider]

## 2024-07-25 ENCOUNTER — APPOINTMENT (OUTPATIENT)
Age: 74
DRG: 180 | End: 2024-07-25
Attending: PHYSICIAN ASSISTANT
Payer: MEDICARE

## 2024-07-25 ENCOUNTER — APPOINTMENT (OUTPATIENT)
Dept: MRI IMAGING | Age: 74
DRG: 180 | End: 2024-07-25
Payer: MEDICARE

## 2024-07-25 ENCOUNTER — APPOINTMENT (OUTPATIENT)
Dept: GENERAL RADIOLOGY | Age: 74
DRG: 180 | End: 2024-07-25
Payer: MEDICARE

## 2024-07-25 ENCOUNTER — APPOINTMENT (OUTPATIENT)
Dept: ULTRASOUND IMAGING | Age: 74
DRG: 180 | End: 2024-07-25
Payer: MEDICARE

## 2024-07-25 PROBLEM — E87.1 HYPONATREMIA: Status: ACTIVE | Noted: 2024-07-25

## 2024-07-25 LAB
ANION GAP SERPL CALC-SCNC: 12 MEQ/L (ref 8–16)
BUN SERPL-MCNC: 36 MG/DL (ref 7–22)
CALCIUM SERPL-MCNC: 8.6 MG/DL (ref 8.5–10.5)
CHLORIDE SERPL-SCNC: 95 MEQ/L (ref 98–111)
CO2 SERPL-SCNC: 21 MEQ/L (ref 23–33)
CREAT SERPL-MCNC: 2.3 MG/DL (ref 0.4–1.2)
DEPRECATED RDW RBC AUTO: 50.1 FL (ref 35–45)
ECHO AV CUSP MM: 1.5 CM
ECHO AV PEAK GRADIENT: 7 MMHG
ECHO AV PEAK VELOCITY: 1.4 M/S
ECHO AV VELOCITY RATIO: 0.71
ECHO BSA: 1.69 M2
ECHO EST RA PRESSURE: 5 MMHG
ECHO LA AREA 2C: 10.5 CM2
ECHO LA AREA 4C: 9.3 CM2
ECHO LA DIAMETER INDEX: 1.79 CM/M2
ECHO LA DIAMETER: 3 CM
ECHO LA MAJOR AXIS: 3.9 CM
ECHO LA MINOR AXIS: 4.2 CM
ECHO LA VOL BP: 19 ML (ref 22–52)
ECHO LA VOL MOD A2C: 22 ML (ref 22–52)
ECHO LA VOL MOD A4C: 17 ML (ref 22–52)
ECHO LA VOL/BSA BIPLANE: 11 ML/M2 (ref 16–34)
ECHO LA VOLUME INDEX MOD A2C: 13 ML/M2 (ref 16–34)
ECHO LA VOLUME INDEX MOD A4C: 10 ML/M2 (ref 16–34)
ECHO LV E' LATERAL VELOCITY: 9 CM/S
ECHO LV E' SEPTAL VELOCITY: 6 CM/S
ECHO LV FRACTIONAL SHORTENING: 26 % (ref 28–44)
ECHO LV INTERNAL DIMENSION DIASTOLE INDEX: 2.02 CM/M2
ECHO LV INTERNAL DIMENSION DIASTOLIC: 3.4 CM (ref 3.9–5.3)
ECHO LV INTERNAL DIMENSION SYSTOLIC INDEX: 1.49 CM/M2
ECHO LV INTERNAL DIMENSION SYSTOLIC: 2.5 CM
ECHO LV ISOVOLUMETRIC RELAXATION TIME (IVRT): 81 MS
ECHO LV IVSD: 0.9 CM (ref 0.6–0.9)
ECHO LV MASS 2D: 78.3 G (ref 67–162)
ECHO LV MASS INDEX 2D: 46.6 G/M2 (ref 43–95)
ECHO LV POSTERIOR WALL DIASTOLIC: 0.8 CM (ref 0.6–0.9)
ECHO LV RELATIVE WALL THICKNESS RATIO: 0.47
ECHO LVOT PEAK GRADIENT: 4 MMHG
ECHO LVOT PEAK VELOCITY: 1 M/S
ECHO MV A VELOCITY: 1.11 M/S
ECHO MV E DECELERATION TIME (DT): 197 MS
ECHO MV E VELOCITY: 0.62 M/S
ECHO MV E/A RATIO: 0.56
ECHO MV E/E' LATERAL: 6.89
ECHO MV E/E' RATIO (AVERAGED): 8.61
ECHO MV E/E' SEPTAL: 10.33
ECHO PV MAX VELOCITY: 1 M/S
ECHO PV PEAK GRADIENT: 4 MMHG
ECHO RV INTERNAL DIMENSION: 2.5 CM
ECHO RV TAPSE: 1.7 CM (ref 1.7–?)
ECHO TV E WAVE: 0.4 M/S
EKG ATRIAL RATE: 86 BPM
EKG P AXIS: 69 DEGREES
EKG P-R INTERVAL: 134 MS
EKG Q-T INTERVAL: 394 MS
EKG QRS DURATION: 126 MS
EKG QTC CALCULATION (BAZETT): 471 MS
EKG R AXIS: 13 DEGREES
EKG T AXIS: 105 DEGREES
EKG VENTRICULAR RATE: 86 BPM
ERYTHROCYTE [DISTWIDTH] IN BLOOD BY AUTOMATED COUNT: 15.3 % (ref 11.5–14.5)
GFR SERPL CREATININE-BSD FRML MDRD: 22 ML/MIN/1.73M2
GLUCOSE SERPL-MCNC: 103 MG/DL (ref 70–108)
HCT VFR BLD AUTO: 26.8 % (ref 37–47)
HGB BLD-MCNC: 8.6 GM/DL (ref 12–16)
MAGNESIUM SERPL-MCNC: 1.9 MG/DL (ref 1.6–2.4)
MCH RBC QN AUTO: 29.4 PG (ref 26–33)
MCHC RBC AUTO-ENTMCNC: 32.1 GM/DL (ref 32.2–35.5)
MCV RBC AUTO: 91.5 FL (ref 81–99)
PLATELET # BLD AUTO: 261 THOU/MM3 (ref 130–400)
PMV BLD AUTO: 9.6 FL (ref 9.4–12.4)
POTASSIUM SERPL-SCNC: 4.8 MEQ/L (ref 3.5–5.2)
RBC # BLD AUTO: 2.93 MILL/MM3 (ref 4.2–5.4)
SODIUM SERPL-SCNC: 128 MEQ/L (ref 135–145)
WBC # BLD AUTO: 14.3 THOU/MM3 (ref 4.8–10.8)

## 2024-07-25 PROCEDURE — 94669 MECHANICAL CHEST WALL OSCILL: CPT

## 2024-07-25 PROCEDURE — 72148 MRI LUMBAR SPINE W/O DYE: CPT

## 2024-07-25 PROCEDURE — 1200000000 HC SEMI PRIVATE

## 2024-07-25 PROCEDURE — 93306 TTE W/DOPPLER COMPLETE: CPT

## 2024-07-25 PROCEDURE — 76775 US EXAM ABDO BACK WALL LIM: CPT

## 2024-07-25 PROCEDURE — 93010 ELECTROCARDIOGRAM REPORT: CPT | Performed by: INTERNAL MEDICINE

## 2024-07-25 PROCEDURE — 99233 SBSQ HOSP IP/OBS HIGH 50: CPT | Performed by: PHYSICIAN ASSISTANT

## 2024-07-25 PROCEDURE — 6370000000 HC RX 637 (ALT 250 FOR IP)

## 2024-07-25 PROCEDURE — 6360000002 HC RX W HCPCS: Performed by: INTERNAL MEDICINE

## 2024-07-25 PROCEDURE — 83735 ASSAY OF MAGNESIUM: CPT

## 2024-07-25 PROCEDURE — 80048 BASIC METABOLIC PNL TOTAL CA: CPT

## 2024-07-25 PROCEDURE — 6360000002 HC RX W HCPCS

## 2024-07-25 PROCEDURE — 93306 TTE W/DOPPLER COMPLETE: CPT | Performed by: INTERNAL MEDICINE

## 2024-07-25 PROCEDURE — 94761 N-INVAS EAR/PLS OXIMETRY MLT: CPT

## 2024-07-25 PROCEDURE — P9047 ALBUMIN (HUMAN), 25%, 50ML: HCPCS | Performed by: INTERNAL MEDICINE

## 2024-07-25 PROCEDURE — 51702 INSERT TEMP BLADDER CATH: CPT

## 2024-07-25 PROCEDURE — 72141 MRI NECK SPINE W/O DYE: CPT

## 2024-07-25 PROCEDURE — 6370000000 HC RX 637 (ALT 250 FOR IP): Performed by: INTERNAL MEDICINE

## 2024-07-25 PROCEDURE — 1200000003 HC TELEMETRY R&B

## 2024-07-25 PROCEDURE — 6370000000 HC RX 637 (ALT 250 FOR IP): Performed by: STUDENT IN AN ORGANIZED HEALTH CARE EDUCATION/TRAINING PROGRAM

## 2024-07-25 PROCEDURE — 71045 X-RAY EXAM CHEST 1 VIEW: CPT

## 2024-07-25 PROCEDURE — 2W35XYZ IMMOBILIZATION OF BACK USING OTHER DEVICE: ICD-10-PCS | Performed by: STUDENT IN AN ORGANIZED HEALTH CARE EDUCATION/TRAINING PROGRAM

## 2024-07-25 PROCEDURE — 99222 1ST HOSP IP/OBS MODERATE 55: CPT | Performed by: INTERNAL MEDICINE

## 2024-07-25 PROCEDURE — 36415 COLL VENOUS BLD VENIPUNCTURE: CPT

## 2024-07-25 PROCEDURE — 2580000003 HC RX 258: Performed by: INTERNAL MEDICINE

## 2024-07-25 PROCEDURE — 2700000000 HC OXYGEN THERAPY PER DAY

## 2024-07-25 PROCEDURE — 72146 MRI CHEST SPINE W/O DYE: CPT

## 2024-07-25 PROCEDURE — 85027 COMPLETE CBC AUTOMATED: CPT

## 2024-07-25 PROCEDURE — 2580000003 HC RX 258

## 2024-07-25 RX ORDER — ALBUMIN (HUMAN) 12.5 G/50ML
25 SOLUTION INTRAVENOUS EVERY 6 HOURS
Status: COMPLETED | OUTPATIENT
Start: 2024-07-25 | End: 2024-07-25

## 2024-07-25 RX ORDER — SODIUM CHLORIDE 9 MG/ML
INJECTION, SOLUTION INTRAVENOUS CONTINUOUS
Status: DISCONTINUED | OUTPATIENT
Start: 2024-07-25 | End: 2024-07-28

## 2024-07-25 RX ADMIN — ONDANSETRON 4 MG: 2 INJECTION INTRAMUSCULAR; INTRAVENOUS at 21:54

## 2024-07-25 RX ADMIN — SENNOSIDES 8.6 MG: 8.6 TABLET, FILM COATED ORAL at 20:07

## 2024-07-25 RX ADMIN — SODIUM CHLORIDE 1 G: 1 TABLET ORAL at 16:26

## 2024-07-25 RX ADMIN — ALBUMIN (HUMAN) 25 G: 0.25 INJECTION, SOLUTION INTRAVENOUS at 17:34

## 2024-07-25 RX ADMIN — METOPROLOL TARTRATE 12.5 MG: 50 TABLET, FILM COATED ORAL at 20:07

## 2024-07-25 RX ADMIN — DROPERIDOL 0.62 MG: 2.5 INJECTION, SOLUTION INTRAMUSCULAR; INTRAVENOUS at 16:22

## 2024-07-25 RX ADMIN — SODIUM CHLORIDE: 9 INJECTION, SOLUTION INTRAVENOUS at 17:23

## 2024-07-25 RX ADMIN — POLYETHYLENE GLYCOL 3350 17 G: 17 POWDER, FOR SOLUTION ORAL at 07:51

## 2024-07-25 RX ADMIN — SODIUM CHLORIDE, PRESERVATIVE FREE 10 ML: 5 INJECTION INTRAVENOUS at 07:54

## 2024-07-25 RX ADMIN — ATORVASTATIN CALCIUM 10 MG: 10 TABLET, FILM COATED ORAL at 07:54

## 2024-07-25 RX ADMIN — DILTIAZEM HYDROCHLORIDE 120 MG: 120 CAPSULE, COATED, EXTENDED RELEASE ORAL at 07:49

## 2024-07-25 RX ADMIN — PANTOPRAZOLE SODIUM 40 MG: 40 TABLET, DELAYED RELEASE ORAL at 04:48

## 2024-07-25 RX ADMIN — ALBUMIN (HUMAN) 25 G: 0.25 INJECTION, SOLUTION INTRAVENOUS at 22:01

## 2024-07-25 RX ADMIN — ONDANSETRON 4 MG: 2 INJECTION INTRAMUSCULAR; INTRAVENOUS at 07:51

## 2024-07-25 RX ADMIN — METOPROLOL TARTRATE 12.5 MG: 50 TABLET, FILM COATED ORAL at 07:49

## 2024-07-25 RX ADMIN — ONDANSETRON 4 MG: 2 INJECTION INTRAMUSCULAR; INTRAVENOUS at 14:39

## 2024-07-25 RX ADMIN — OXYCODONE 5 MG: 5 TABLET ORAL at 21:54

## 2024-07-25 RX ADMIN — SODIUM CHLORIDE 1 G: 1 TABLET ORAL at 07:49

## 2024-07-25 ASSESSMENT — PAIN DESCRIPTION - DESCRIPTORS: DESCRIPTORS: ACHING

## 2024-07-25 ASSESSMENT — PAIN DESCRIPTION - ONSET: ONSET: PROGRESSIVE

## 2024-07-25 ASSESSMENT — PAIN SCALES - GENERAL
PAINLEVEL_OUTOF10: 4
PAINLEVEL_OUTOF10: 0

## 2024-07-25 ASSESSMENT — PAIN - FUNCTIONAL ASSESSMENT: PAIN_FUNCTIONAL_ASSESSMENT: ACTIVITIES ARE NOT PREVENTED

## 2024-07-25 ASSESSMENT — PAIN DESCRIPTION - PAIN TYPE: TYPE: ACUTE PAIN

## 2024-07-25 ASSESSMENT — PAIN DESCRIPTION - FREQUENCY: FREQUENCY: INTERMITTENT

## 2024-07-25 ASSESSMENT — PAIN DESCRIPTION - LOCATION: LOCATION: ABDOMEN

## 2024-07-25 ASSESSMENT — PAIN DESCRIPTION - ORIENTATION: ORIENTATION: MID

## 2024-07-25 NOTE — PLAN OF CARE
Problem: Safety - Adult  Goal: Free from fall injury  7/24/2024 2026 by Jacquelyn Mobley, RN  Outcome: Progressing     Problem: Discharge Planning  Goal: Discharge to home or other facility with appropriate resources  7/24/2024 2026 by Jacquelyn Mobley, RN  Outcome: Progressing  Flowsheets (Taken 7/24/2024 1915)  Discharge to home or other facility with appropriate resources: Identify barriers to discharge with patient and caregiver     Problem: Skin/Tissue Integrity  Goal: Absence of new skin breakdown  Description: 1.  Monitor for areas of redness and/or skin breakdown  2.  Assess vascular access sites hourly  3.  Every 4-6 hours minimum:  Change oxygen saturation probe site  4.  Every 4-6 hours:  If on nasal continuous positive airway pressure, respiratory therapy assess nares and determine need for appliance change or resting period.  7/24/2024 2026 by Jacquelyn Mobley, RN  Outcome: Progressing     Problem: Nutrition Deficit:  Goal: Optimize nutritional status  Outcome: Progressing     Problem: Pain  Goal: Verbalizes/displays adequate comfort level or baseline comfort level  Outcome: Progressing

## 2024-07-26 ENCOUNTER — APPOINTMENT (OUTPATIENT)
Dept: INTERVENTIONAL RADIOLOGY/VASCULAR | Age: 74
DRG: 180 | End: 2024-07-26
Payer: MEDICARE

## 2024-07-26 PROBLEM — C34.91 ADENOCARCINOMA OF RIGHT LUNG (HCC): Status: ACTIVE | Noted: 2024-07-24

## 2024-07-26 PROBLEM — R06.02 SHORTNESS OF BREATH: Status: ACTIVE | Noted: 2024-07-26

## 2024-07-26 PROBLEM — R18.0 MALIGNANT ASCITES: Status: ACTIVE | Noted: 2024-07-13

## 2024-07-26 PROBLEM — R68.89 SUSPECTED MALIGNANT NEOPLASM: Status: ACTIVE | Noted: 2024-07-26

## 2024-07-26 PROBLEM — N17.9 ACUTE RENAL FAILURE (HCC): Status: ACTIVE | Noted: 2024-07-26

## 2024-07-26 PROBLEM — C79.51 METASTATIC ADENOCARCINOMA TO BONE (HCC): Status: ACTIVE | Noted: 2024-07-26

## 2024-07-26 LAB
ALBUMIN SERPL BCG-MCNC: 3.1 G/DL (ref 3.5–5.1)
ALP SERPL-CCNC: 123 U/L (ref 38–126)
ALT SERPL W/O P-5'-P-CCNC: 8 U/L (ref 11–66)
ANION GAP SERPL CALC-SCNC: 16 MEQ/L (ref 8–16)
AST SERPL-CCNC: 14 U/L (ref 5–40)
BILIRUB SERPL-MCNC: 0.3 MG/DL (ref 0.3–1.2)
BUN SERPL-MCNC: 37 MG/DL (ref 7–22)
CALCIUM SERPL-MCNC: 8.7 MG/DL (ref 8.5–10.5)
CHLORIDE SERPL-SCNC: 98 MEQ/L (ref 98–111)
CO2 SERPL-SCNC: 19 MEQ/L (ref 23–33)
CREAT SERPL-MCNC: 2.1 MG/DL (ref 0.4–1.2)
DEPRECATED RDW RBC AUTO: 51 FL (ref 35–45)
ERYTHROCYTE [DISTWIDTH] IN BLOOD BY AUTOMATED COUNT: 15.6 % (ref 11.5–14.5)
GFR SERPL CREATININE-BSD FRML MDRD: 24 ML/MIN/1.73M2
GLUCOSE SERPL-MCNC: 91 MG/DL (ref 70–108)
HCT VFR BLD AUTO: 22.1 % (ref 37–47)
HGB BLD-MCNC: 7.1 GM/DL (ref 12–16)
MCH RBC QN AUTO: 29.5 PG (ref 26–33)
MCHC RBC AUTO-ENTMCNC: 32.1 GM/DL (ref 32.2–35.5)
MCV RBC AUTO: 91.7 FL (ref 81–99)
PLATELET # BLD AUTO: 272 THOU/MM3 (ref 130–400)
PMV BLD AUTO: 9.1 FL (ref 9.4–12.4)
POTASSIUM SERPL-SCNC: 4.6 MEQ/L (ref 3.5–5.2)
PROT SERPL-MCNC: 5.1 G/DL (ref 6.1–8)
RBC # BLD AUTO: 2.41 MILL/MM3 (ref 4.2–5.4)
SODIUM SERPL-SCNC: 133 MEQ/L (ref 135–145)
WBC # BLD AUTO: 12.9 THOU/MM3 (ref 4.8–10.8)

## 2024-07-26 PROCEDURE — 6360000002 HC RX W HCPCS

## 2024-07-26 PROCEDURE — 0T9030Z DRAINAGE OF RIGHT KIDNEY WITH DRAINAGE DEVICE, PERCUTANEOUS APPROACH: ICD-10-PCS | Performed by: STUDENT IN AN ORGANIZED HEALTH CARE EDUCATION/TRAINING PROGRAM

## 2024-07-26 PROCEDURE — 1200000000 HC SEMI PRIVATE

## 2024-07-26 PROCEDURE — 6370000000 HC RX 637 (ALT 250 FOR IP): Performed by: INTERNAL MEDICINE

## 2024-07-26 PROCEDURE — 6370000000 HC RX 637 (ALT 250 FOR IP): Performed by: STUDENT IN AN ORGANIZED HEALTH CARE EDUCATION/TRAINING PROGRAM

## 2024-07-26 PROCEDURE — 0T763DZ DILATION OF RIGHT URETER WITH INTRALUMINAL DEVICE, PERCUTANEOUS APPROACH: ICD-10-PCS | Performed by: STUDENT IN AN ORGANIZED HEALTH CARE EDUCATION/TRAINING PROGRAM

## 2024-07-26 PROCEDURE — 6360000002 HC RX W HCPCS: Performed by: RADIOLOGY

## 2024-07-26 PROCEDURE — 80053 COMPREHEN METABOLIC PANEL: CPT

## 2024-07-26 PROCEDURE — 50695 PLMT URETERAL STENT PRQ: CPT

## 2024-07-26 PROCEDURE — 6370000000 HC RX 637 (ALT 250 FOR IP): Performed by: UROLOGY

## 2024-07-26 PROCEDURE — 6360000004 HC RX CONTRAST MEDICATION: Performed by: RADIOLOGY

## 2024-07-26 PROCEDURE — 99233 SBSQ HOSP IP/OBS HIGH 50: CPT | Performed by: PHYSICIAN ASSISTANT

## 2024-07-26 PROCEDURE — 6360000002 HC RX W HCPCS: Performed by: STUDENT IN AN ORGANIZED HEALTH CARE EDUCATION/TRAINING PROGRAM

## 2024-07-26 PROCEDURE — BT111ZZ FLUOROSCOPY OF RIGHT KIDNEY USING LOW OSMOLAR CONTRAST: ICD-10-PCS | Performed by: STUDENT IN AN ORGANIZED HEALTH CARE EDUCATION/TRAINING PROGRAM

## 2024-07-26 PROCEDURE — 99232 SBSQ HOSP IP/OBS MODERATE 35: CPT | Performed by: INTERNAL MEDICINE

## 2024-07-26 PROCEDURE — 2709999900 IR GUIDED URETERAL STENT PLACE W NEPHRO CATH NEW ACCESS

## 2024-07-26 PROCEDURE — 6370000000 HC RX 637 (ALT 250 FOR IP)

## 2024-07-26 PROCEDURE — 85027 COMPLETE CBC AUTOMATED: CPT

## 2024-07-26 PROCEDURE — 2580000003 HC RX 258

## 2024-07-26 PROCEDURE — 36415 COLL VENOUS BLD VENIPUNCTURE: CPT

## 2024-07-26 PROCEDURE — 99233 SBSQ HOSP IP/OBS HIGH 50: CPT | Performed by: STUDENT IN AN ORGANIZED HEALTH CARE EDUCATION/TRAINING PROGRAM

## 2024-07-26 RX ORDER — OXYBUTYNIN CHLORIDE 5 MG/1
5 TABLET ORAL EVERY 8 HOURS PRN
Status: DISCONTINUED | OUTPATIENT
Start: 2024-07-26 | End: 2024-07-28 | Stop reason: HOSPADM

## 2024-07-26 RX ORDER — MIDAZOLAM HYDROCHLORIDE 1 MG/ML
INJECTION INTRAMUSCULAR; INTRAVENOUS PRN
Status: COMPLETED | OUTPATIENT
Start: 2024-07-26 | End: 2024-07-26

## 2024-07-26 RX ORDER — ONDANSETRON 2 MG/ML
INJECTION INTRAMUSCULAR; INTRAVENOUS PRN
Status: COMPLETED | OUTPATIENT
Start: 2024-07-26 | End: 2024-07-26

## 2024-07-26 RX ORDER — SENNA AND DOCUSATE SODIUM 50; 8.6 MG/1; MG/1
2 TABLET, FILM COATED ORAL DAILY PRN
Status: DISCONTINUED | OUTPATIENT
Start: 2024-07-26 | End: 2024-07-28 | Stop reason: HOSPADM

## 2024-07-26 RX ORDER — FENTANYL CITRATE 50 UG/ML
INJECTION, SOLUTION INTRAMUSCULAR; INTRAVENOUS PRN
Status: COMPLETED | OUTPATIENT
Start: 2024-07-26 | End: 2024-07-26

## 2024-07-26 RX ADMIN — POLYETHYLENE GLYCOL 3350 17 G: 17 POWDER, FOR SOLUTION ORAL at 07:56

## 2024-07-26 RX ADMIN — FENTANYL CITRATE 25 MCG: 50 INJECTION INTRAMUSCULAR; INTRAVENOUS at 20:42

## 2024-07-26 RX ADMIN — MIDAZOLAM 1 MG: 1 INJECTION INTRAMUSCULAR; INTRAVENOUS at 13:55

## 2024-07-26 RX ADMIN — IOTHALAMATE MEGLUMINE 20 ML: 600 INJECTION INTRAVASCULAR at 14:25

## 2024-07-26 RX ADMIN — FENTANYL CITRATE 50 MCG: 50 INJECTION, SOLUTION INTRAMUSCULAR; INTRAVENOUS at 13:55

## 2024-07-26 RX ADMIN — MIDAZOLAM 0.5 MG: 1 INJECTION INTRAMUSCULAR; INTRAVENOUS at 14:18

## 2024-07-26 RX ADMIN — ONDANSETRON 4 MG: 2 INJECTION INTRAMUSCULAR; INTRAVENOUS at 07:53

## 2024-07-26 RX ADMIN — FENTANYL CITRATE 25 MCG: 50 INJECTION, SOLUTION INTRAMUSCULAR; INTRAVENOUS at 14:18

## 2024-07-26 RX ADMIN — PANTOPRAZOLE SODIUM 40 MG: 40 TABLET, DELAYED RELEASE ORAL at 06:05

## 2024-07-26 RX ADMIN — DROPERIDOL 0.62 MG: 2.5 INJECTION, SOLUTION INTRAMUSCULAR; INTRAVENOUS at 20:01

## 2024-07-26 RX ADMIN — METOPROLOL TARTRATE 12.5 MG: 50 TABLET, FILM COATED ORAL at 07:56

## 2024-07-26 RX ADMIN — DILTIAZEM HYDROCHLORIDE 120 MG: 120 CAPSULE, COATED, EXTENDED RELEASE ORAL at 07:56

## 2024-07-26 RX ADMIN — ONDANSETRON 8 MG: 2 INJECTION INTRAMUSCULAR; INTRAVENOUS at 13:47

## 2024-07-26 RX ADMIN — ATORVASTATIN CALCIUM 10 MG: 10 TABLET, FILM COATED ORAL at 07:56

## 2024-07-26 RX ADMIN — SODIUM CHLORIDE, PRESERVATIVE FREE 10 ML: 5 INJECTION INTRAVENOUS at 20:02

## 2024-07-26 RX ADMIN — OXYBUTYNIN CHLORIDE 5 MG: 5 TABLET ORAL at 17:38

## 2024-07-26 RX ADMIN — SODIUM CHLORIDE, PRESERVATIVE FREE 10 ML: 5 INJECTION INTRAVENOUS at 07:57

## 2024-07-26 ASSESSMENT — PAIN SCALES - GENERAL
PAINLEVEL_OUTOF10: 0
PAINLEVEL_OUTOF10: 6

## 2024-07-26 ASSESSMENT — PAIN DESCRIPTION - LOCATION: LOCATION: ABDOMEN

## 2024-07-26 ASSESSMENT — PAIN DESCRIPTION - DESCRIPTORS: DESCRIPTORS: ACHING;SORE;SPASM;TENDER

## 2024-07-26 NOTE — H&P
[facility]  Sedation/Analgesia History & Physical    Pt Name: Kimberlee Alvarado  MRN: [id number]  YOB: 1950  Provider Performing Procedure: Maycol Zacarias MD, MD  Primary Care Physician: Jim Luz APRN - CNP    Formulation and discussion of sedation / procedure plans, risks, benefits, side effects and alternatives with patient and/or responsible adult completed.    PRE-PROCEDURE   DNR-CCA/DNR-CC []Yes [x]No  Brief History/Pre-Procedure Diagnosis: hydronephrosis           MEDICAL HISTORY  []CAD/Valve  []Liver Disease  []Lung Disease []Diabetes  []Hypertension []Renal Disease  []Additional information:       has a past medical history of Atrial fibrillation with rapid ventricular response (HCC), Breast cancer (HCC), Cancer (HCC), History of therapeutic radiation, Hx antineoplastic chemo, and Nausea & vomiting.    SURGICAL HISTORY   has a past surgical history that includes Bunionectomy; Colonoscopy (12/02/2016); Carpal tunnel release (Right, 11/28/2016); Tonsillectomy and adenoidectomy (Bilateral); Rotator cuff repair (Right, 08/03/2017); pr bx of breast, needle core, image guide (Left); pr bx of breast, needle core, image guide (Left); pr bx of breast, needle core, image guide (Left); Breast lumpectomy (Left); and CT NEEDLE BIOPSY LUNG PERCUTANEOUS (7/24/2024).  Additional information:       ALLERGIES   Allergies as of 07/22/2024    (No Known Allergies)     Additional information:       MEDICATIONS   Coumadin Use Last 5 Days [x]No []Yes  Antiplatelet drug therapy use last 5 days  [x]No []Yes  Other anticoagulant use last 5 days  [x]No []Yes    Current Facility-Administered Medications:     sennosides-docusate sodium (SENOKOT-S) 8.6-50 MG tablet 2 tablet, 2 tablet, Oral, Daily PRN, Sarita Banda PA-C    0.9 % sodium chloride infusion, , IntraVENous, Continuous, HemVenice rascon, DO, Last Rate: 75 mL/hr at 07/25/24 1723, New Bag at 07/25/24 1723    midazolam (VERSED) injection 1

## 2024-07-26 NOTE — OP NOTE
Department of Radiology  Post Procedure Progress Note      Pre-Procedure Diagnosis:  hydronephrosis     Procedure Performed:  right ureteral stent and nephrostomy tube insertion     Anesthesia: local / versed and fentanyl    Findings: successful    Immediate Complications:  None    Estimated Blood Loss: minimal    SEE DICTATED PROCEDURE NOTE FOR COMPLETE DETAILS.    Maycol Zacarias MD   7/26/2024 2:24 PM

## 2024-07-26 NOTE — PLAN OF CARE
Problem: Safety - Adult  Goal: Free from fall injury  7/25/2024 2037 by Jacquelyn Mobley, RN  Outcome: Progressing     Problem: Discharge Planning  Goal: Discharge to home or other facility with appropriate resources  7/25/2024 2037 by Jacquelyn Mobley, RN  Outcome: Progressing  Flowsheets (Taken 7/25/2024 1945)  Discharge to home or other facility with appropriate resources: Identify barriers to discharge with patient and caregiver     Problem: Skin/Tissue Integrity  Goal: Absence of new skin breakdown  Description: 1.  Monitor for areas of redness and/or skin breakdown  2.  Assess vascular access sites hourly  3.  Every 4-6 hours minimum:  Change oxygen saturation probe site  4.  Every 4-6 hours:  If on nasal continuous positive airway pressure, respiratory therapy assess nares and determine need for appliance change or resting period.  7/25/2024 2037 by Jacquelyn Mobley, RN  Outcome: Progressing     Problem: Nutrition Deficit:  Goal: Optimize nutritional status  Outcome: Progressing     Problem: Pain  Goal: Verbalizes/displays adequate comfort level or baseline comfort level  Outcome: Progressing

## 2024-07-26 NOTE — CARE COORDINATION
07/23/24 1300   Readmission Assessment   Number of Days since last admission? 1-7 days   Previous Disposition SNF  ([facility])   Who is being Interviewed Caregiver  (chart review)   What was the patient's/caregiver's perception as to why they think they needed to return back to the hospital? Other (Comment)  (abdominal pain)   Did you visit your Primary Care Physician after you left the hospital, before you returned this time? Yes  (provider at facility)   Did you see a specialist, such as Cardiac, Pulmonary, Orthopedic Physician, etc. after you left the hospital? No   Who advised the patient to return to the hospital? Skilled Unit   Does the patient report anything that got in the way of taking their medications? No   In our efforts to provide the best possible care to you and others like you, can you think of anything that we could have done to help you after you left the hospital the first time, so that you might not have needed to return so soon? Other (Comment)  (no)       
7/26/24, 7:51 AM EDT    DISCHARGE ON GOING EVALUATION    [facility] Hospital day: 3  Location: 8A-15/015-A Reason for admit: Spontaneous bacterial peritonitis (HCC) [K65.2]  Abdominal pain [R10.9]  Acute renal failure, unspecified acute renal failure type (HCC) [N17.9]     Procedures:   7/23 Paracentesis- 3.6 L  7/24 Lung biopsy  7/26 right ureteral stent and nephrostomy tube insertion      Imaging since last note: 7/25 MRI T Spine WO:1. Areas of abnormal signal intensity in the T2, T3, T4, T5, T6, T11 and T12   vertebral bodies and the spinous process of T5 consistent with bony metastases.   2. The thoracic spinal cord is normal.   3. Right lower lobe mass.   4. Bilateral pleural effusions   7/25 MRI L Spine WO: 1. Areas of abnormal signal intensity in the T11, T12, L4, L5 and S1 vertebral   bodies and in the right iliac bone consistent with bony metastases.   2. Mild to moderate canal, moderate right and mild to moderate left-sided   foraminal stenosis at L4-5.   3. Mild canal and moderate bilateral foraminal stenosis at L5-S1.   4. There is mild to moderate canal and bilateral foraminal stenosis at L3-4.   5. Mild canal and bilateral foraminal stenosis at L2-3.   6. Right-sided hydronephrosis and hydroureter.   7. Degenerative change involving the sacroiliac joints bilaterally.   8. Ascites.   7/25 MRI C Spine WO:   1. Areas of abnormal signal intensity in the C2, C5, T2, T3 and T4 vertebral   bodies new since previous study dated 3/25/2024, consistent with metastatic   disease.   2. Acute fracture involving the anterior arch of C1 on the right side, right   lateral mass of C1 and posterior arch of C1 on the left side, seen better on   previous CT scan. The fracture involving the right lateral mass at C1 may be   pathologic.   3. There is mild canal stenosis but no cord compression and mild to moderate   bilateral foraminal stenosis at C4-5, C5-6 and to a lesser extent C6-7.   7/25 CXR: Small right 
Case Management Assessment Initial Evaluation    Date/Time of Evaluation: 2024 1:22 PM  Assessment Completed by: Clarisse Jackson RN    If patient is discharged prior to next notation, then this note serves as note for discharge by case management.    Patient Name: Kimberlee Alvarado                   YOB: 1950  Diagnosis: Spontaneous bacterial peritonitis (HCC) [K65.2]  Abdominal pain [R10.9]  Acute renal failure, unspecified acute renal failure type (HCC) [N17.9]                   Date / Time: 2024  8:40 PM  [facility]     Patient Admission Status: Inpatient   Readmission Risk Low 0-14, Mod 15-19), High > 20: Readmission Risk Score: 23.3    Current PCP: Jim Luz APRN - [facility]    Additional Case Management Notes: admit from ER with abdominal pain and emesis.   Palliative care consulted. Planning lung biopsy for tomorrow with IR.   On 3 L O2.    24 21:10 24 05:49   Sodium 131 (L) 130 (L)   BUN,BUNPL 36 (H) 36 (H)   Creatinine 2.3 (H) 2.1 (H)   Procalcitonin  0.28 (H)   WBC 18.2 (H) 15.3 (H)   Hemoglobin Quant 8.7 (L) 8.4 (L)   Hematocrit 26.5 (L) 24.9 (L)     Procedures:   Paracentesis ordered  IR: Lung Biopsy scheduled for     Imagin/22 CXR: 1.Minimal blunting of the left costophrenic angle could represent a small left pleural fluid collection. Mildly increased density along the left hemidiaphragm could represent atelectasis or pneumonia.    CT Abd/Plv WO: 1. Large volume low-density ascites, increased in volume when compared to prior exam.   2. Persistent right hydronephrosis and hydroureter with no renal or ureteral stones. Findings could be related to distal ureteral obstruction or vesicoureteral reflux.    CT Chest WO: 1. New small bilateral pleural fluid collections larger on the left than the right with adjacent airspace disease that may represent compressive atelectasis or pneumonia.   2. Patchy small ground-glass opacities in the right 
Assistance needed at discharge: N/A            Potential DME:    Patient expects to discharge to: Skilled nursing facility  Plan for transportation at discharge:      Financial  Payor: MEDICARE / Plan: MEDICARE PART A AND B / Product Type: *No Product type* /     Potential assistance Purchasing Medications: No

## 2024-07-26 NOTE — H&P
Formulation and discussion of sedation / procedure plans, risks, benefits, side effects and alternatives with patient and/or responsible adult completed.    History and Physical reviewed and unchanged.    Electronically signed by Maycol Zacarias MD on 7/26/24 at 1:30 PM EDT

## 2024-07-27 PROBLEM — E87.20 METABOLIC ACIDOSIS: Status: ACTIVE | Noted: 2024-07-27

## 2024-07-27 LAB
ANION GAP SERPL CALC-SCNC: 17 MEQ/L (ref 8–16)
BUN SERPL-MCNC: 43 MG/DL (ref 7–22)
CALCIUM SERPL-MCNC: 8.9 MG/DL (ref 8.5–10.5)
CHLORIDE SERPL-SCNC: 99 MEQ/L (ref 98–111)
CO2 SERPL-SCNC: 17 MEQ/L (ref 23–33)
CREAT SERPL-MCNC: 1.7 MG/DL (ref 0.4–1.2)
DEPRECATED RDW RBC AUTO: 51.8 FL (ref 35–45)
EKG ATRIAL RATE: 115 BPM
EKG P AXIS: 77 DEGREES
EKG P-R INTERVAL: 130 MS
EKG Q-T INTERVAL: 356 MS
EKG QRS DURATION: 122 MS
EKG QTC CALCULATION (BAZETT): 492 MS
EKG R AXIS: 43 DEGREES
EKG T AXIS: 79 DEGREES
EKG VENTRICULAR RATE: 115 BPM
ERYTHROCYTE [DISTWIDTH] IN BLOOD BY AUTOMATED COUNT: 15.7 % (ref 11.5–14.5)
FERRITIN SERPL IA-MCNC: 1004 NG/ML (ref 10–291)
FOLATE SERPL-MCNC: > 20 NG/ML (ref 4.8–24.2)
GFR SERPL CREATININE-BSD FRML MDRD: 31 ML/MIN/1.73M2
GLUCOSE SERPL-MCNC: 107 MG/DL (ref 70–108)
HCT VFR BLD AUTO: 24.9 % (ref 37–47)
HGB BLD-MCNC: 7.8 GM/DL (ref 12–16)
HGB RETIC QN AUTO: 26.7 PG (ref 28.2–35.7)
IMM RETICS NFR: 43.4 % (ref 3–15.9)
IRON SATN MFR SERPL: 21 % (ref 20–50)
IRON SERPL-MCNC: 24 UG/DL (ref 50–170)
MCH RBC QN AUTO: 29 PG (ref 26–33)
MCHC RBC AUTO-ENTMCNC: 31.3 GM/DL (ref 32.2–35.5)
MCV RBC AUTO: 92.6 FL (ref 81–99)
PLATELET # BLD AUTO: 313 THOU/MM3 (ref 130–400)
PMV BLD AUTO: 9.7 FL (ref 9.4–12.4)
POTASSIUM SERPL-SCNC: 4.9 MEQ/L (ref 3.5–5.2)
RBC # BLD AUTO: 2.69 MILL/MM3 (ref 4.2–5.4)
RETICS # AUTO: 143 THOU/MM3 (ref 20–115)
RETICS/RBC NFR AUTO: 5.3 % (ref 0.5–2)
SODIUM SERPL-SCNC: 133 MEQ/L (ref 135–145)
TIBC SERPL-MCNC: 116 UG/DL (ref 171–450)
VIT B12 SERPL-MCNC: 628 PG/ML (ref 211–911)
WBC # BLD AUTO: 14.4 THOU/MM3 (ref 4.8–10.8)

## 2024-07-27 PROCEDURE — 6370000000 HC RX 637 (ALT 250 FOR IP): Performed by: INTERNAL MEDICINE

## 2024-07-27 PROCEDURE — 93010 ELECTROCARDIOGRAM REPORT: CPT | Performed by: INTERNAL MEDICINE

## 2024-07-27 PROCEDURE — 6360000002 HC RX W HCPCS

## 2024-07-27 PROCEDURE — 2580000003 HC RX 258: Performed by: INTERNAL MEDICINE

## 2024-07-27 PROCEDURE — 99232 SBSQ HOSP IP/OBS MODERATE 35: CPT | Performed by: INTERNAL MEDICINE

## 2024-07-27 PROCEDURE — 82746 ASSAY OF FOLIC ACID SERUM: CPT

## 2024-07-27 PROCEDURE — 93005 ELECTROCARDIOGRAM TRACING: CPT | Performed by: PHYSICIAN ASSISTANT

## 2024-07-27 PROCEDURE — 99233 SBSQ HOSP IP/OBS HIGH 50: CPT | Performed by: PHYSICIAN ASSISTANT

## 2024-07-27 PROCEDURE — 85046 RETICYTE/HGB CONCENTRATE: CPT

## 2024-07-27 PROCEDURE — 83540 ASSAY OF IRON: CPT

## 2024-07-27 PROCEDURE — 94761 N-INVAS EAR/PLS OXIMETRY MLT: CPT

## 2024-07-27 PROCEDURE — 6370000000 HC RX 637 (ALT 250 FOR IP)

## 2024-07-27 PROCEDURE — 6370000000 HC RX 637 (ALT 250 FOR IP): Performed by: STUDENT IN AN ORGANIZED HEALTH CARE EDUCATION/TRAINING PROGRAM

## 2024-07-27 PROCEDURE — 94669 MECHANICAL CHEST WALL OSCILL: CPT

## 2024-07-27 PROCEDURE — 2700000000 HC OXYGEN THERAPY PER DAY

## 2024-07-27 PROCEDURE — 2500000003 HC RX 250 WO HCPCS

## 2024-07-27 PROCEDURE — 83550 IRON BINDING TEST: CPT

## 2024-07-27 PROCEDURE — 1200000000 HC SEMI PRIVATE

## 2024-07-27 PROCEDURE — 82607 VITAMIN B-12: CPT

## 2024-07-27 PROCEDURE — 85027 COMPLETE CBC AUTOMATED: CPT

## 2024-07-27 PROCEDURE — 82728 ASSAY OF FERRITIN: CPT

## 2024-07-27 PROCEDURE — 80048 BASIC METABOLIC PNL TOTAL CA: CPT

## 2024-07-27 PROCEDURE — 6370000000 HC RX 637 (ALT 250 FOR IP): Performed by: PHYSICIAN ASSISTANT

## 2024-07-27 PROCEDURE — 99231 SBSQ HOSP IP/OBS SF/LOW 25: CPT | Performed by: UROLOGY

## 2024-07-27 PROCEDURE — 93005 ELECTROCARDIOGRAM TRACING: CPT

## 2024-07-27 PROCEDURE — 6360000002 HC RX W HCPCS: Performed by: SPECIALIST

## 2024-07-27 PROCEDURE — 36415 COLL VENOUS BLD VENIPUNCTURE: CPT

## 2024-07-27 RX ORDER — FENTANYL CITRATE 50 UG/ML
50 INJECTION, SOLUTION INTRAMUSCULAR; INTRAVENOUS EVERY 4 HOURS PRN
Status: DISCONTINUED | OUTPATIENT
Start: 2024-07-27 | End: 2024-07-28

## 2024-07-27 RX ORDER — METOPROLOL TARTRATE 1 MG/ML
5 INJECTION, SOLUTION INTRAVENOUS ONCE
Status: COMPLETED | OUTPATIENT
Start: 2024-07-27 | End: 2024-07-27

## 2024-07-27 RX ORDER — MEGESTROL ACETATE 40 MG/1
20 TABLET ORAL DAILY
Status: DISCONTINUED | OUTPATIENT
Start: 2024-07-27 | End: 2024-07-28 | Stop reason: HOSPADM

## 2024-07-27 RX ORDER — SCOLOPAMINE TRANSDERMAL SYSTEM 1 MG/1
1 PATCH, EXTENDED RELEASE TRANSDERMAL
Status: DISCONTINUED | OUTPATIENT
Start: 2024-07-27 | End: 2024-07-28 | Stop reason: HOSPADM

## 2024-07-27 RX ADMIN — DILTIAZEM HYDROCHLORIDE 120 MG: 120 CAPSULE, COATED, EXTENDED RELEASE ORAL at 10:56

## 2024-07-27 RX ADMIN — DROPERIDOL 0.62 MG: 2.5 INJECTION, SOLUTION INTRAMUSCULAR; INTRAVENOUS at 20:18

## 2024-07-27 RX ADMIN — ONDANSETRON 4 MG: 2 INJECTION INTRAMUSCULAR; INTRAVENOUS at 08:32

## 2024-07-27 RX ADMIN — METOPROLOL TARTRATE 12.5 MG: 50 TABLET, FILM COATED ORAL at 21:14

## 2024-07-27 RX ADMIN — METOPROLOL TARTRATE 12.5 MG: 50 TABLET, FILM COATED ORAL at 10:56

## 2024-07-27 RX ADMIN — ONDANSETRON 4 MG: 2 INJECTION INTRAMUSCULAR; INTRAVENOUS at 00:42

## 2024-07-27 RX ADMIN — FENTANYL CITRATE 50 MCG: 50 INJECTION INTRAMUSCULAR; INTRAVENOUS at 00:38

## 2024-07-27 RX ADMIN — SODIUM CHLORIDE: 9 INJECTION, SOLUTION INTRAVENOUS at 10:54

## 2024-07-27 RX ADMIN — OXYCODONE 5 MG: 5 TABLET ORAL at 11:57

## 2024-07-27 RX ADMIN — EPOETIN ALFA-EPBX 1880 UNITS: 2000 INJECTION, SOLUTION INTRAVENOUS; SUBCUTANEOUS at 15:59

## 2024-07-27 RX ADMIN — FENTANYL CITRATE 50 MCG: 50 INJECTION INTRAMUSCULAR; INTRAVENOUS at 04:38

## 2024-07-27 RX ADMIN — MEGESTROL ACETATE 20 MG: 40 TABLET ORAL at 15:59

## 2024-07-27 RX ADMIN — OXYCODONE 5 MG: 5 TABLET ORAL at 20:19

## 2024-07-27 RX ADMIN — FENTANYL CITRATE 50 MCG: 50 INJECTION INTRAMUSCULAR; INTRAVENOUS at 22:44

## 2024-07-27 RX ADMIN — DROPERIDOL 0.62 MG: 2.5 INJECTION, SOLUTION INTRAMUSCULAR; INTRAVENOUS at 11:53

## 2024-07-27 RX ADMIN — ONDANSETRON 4 MG: 2 INJECTION INTRAMUSCULAR; INTRAVENOUS at 17:41

## 2024-07-27 RX ADMIN — METOPROLOL TARTRATE 5 MG: 5 INJECTION INTRAVENOUS at 21:55

## 2024-07-27 ASSESSMENT — PAIN DESCRIPTION - DESCRIPTORS
DESCRIPTORS: SORE;NAGGING;DISCOMFORT
DESCRIPTORS: ACHING;SORE;SPASM
DESCRIPTORS: ACHING
DESCRIPTORS: ACHING;SORE;SPASM

## 2024-07-27 ASSESSMENT — PAIN DESCRIPTION - LOCATION
LOCATION: ABDOMEN

## 2024-07-27 ASSESSMENT — PAIN SCALES - GENERAL
PAINLEVEL_OUTOF10: 6
PAINLEVEL_OUTOF10: 4
PAINLEVEL_OUTOF10: 8
PAINLEVEL_OUTOF10: 0
PAINLEVEL_OUTOF10: 7
PAINLEVEL_OUTOF10: 0
PAINLEVEL_OUTOF10: 8
PAINLEVEL_OUTOF10: 0
PAINLEVEL_OUTOF10: 7
PAINLEVEL_OUTOF10: 3
PAINLEVEL_OUTOF10: 0

## 2024-07-27 ASSESSMENT — PAIN - FUNCTIONAL ASSESSMENT: PAIN_FUNCTIONAL_ASSESSMENT: PREVENTS OR INTERFERES SOME ACTIVE ACTIVITIES AND ADLS

## 2024-07-27 ASSESSMENT — PAIN DESCRIPTION - ORIENTATION: ORIENTATION: MID

## 2024-07-27 NOTE — PLAN OF CARE
Problem: Safety - Adult  Goal: Free from fall injury  Outcome: Progressing  Flowsheets (Taken 7/26/2024 2042)  Free From Fall Injury: Instruct family/caregiver on patient safety     Problem: Discharge Planning  Goal: Discharge to home or other facility with appropriate resources  Outcome: Progressing     Problem: Pain  Goal: Verbalizes/displays adequate comfort level or baseline comfort level  Outcome: Progressing

## 2024-07-27 NOTE — ONCOLOGY
Oncology note:  Covering for Dr. EscalanteKimberlee is a 74-year-old white female who has a remote diagnosis of left breast cancer 2013.  She is status post left breast lumpectomy, chemotherapy, radiation therapy and antihormonal treatment.  The patient has been admitted to the hospital with back pain, abdominal pain.  The patient underwent right lung biopsy.  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  The patient has metastatic disease to thoracic spine and potential carcinomatosis.  The patient has acute renal failure.  Nephrology on board.  I put radiation oncology consult.  I will start patient on Retacrit treating anemia of kidney disease.  I discussed with the family options of treatment in the future which would be based on the results of biomarkers Dr. Escalanet is going to order.  Dr. Escalante will resume patient care Monday, July 29.  A full consult will be dictated.  Thank you

## 2024-07-27 NOTE — CONSULTS
[facility]  [facility] [address]  [address] [address]  Dept: [phone]  Loc: [phone]  Visit Date: 7/22/2024    Urology Consult Note    Reason for Consult:  right hydronephrosis   Requesting Physician:  neph    History Obtained From:  patient, electronic medical record    Chief Complaint: abd pain    HISTORY OF PRESENT ILLNESS:                The patient is a 74 y.o. female with significant past medical history of PMHx of Afib, breast cancer s/p lumpectomy, HTN, HLD who presents to [facility] with abdominal pain, N/V. 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 She had a recent OP PET scan which showed significant activity in her lung and bone. She was scheduled for a lung bx but she was unable to proceed d/t feeling too weak/unwell. Since discharge, she has been having abdominal pain that progressively worsened over the past day, and multiple episodes of vomiting \"coffee-ground\" emesis today. Pt also reports lightheadedness, weakness, poor appetite/PO intake, abdominal \"tightness\". She denies fevers, chills, CP, SOB, dysuria, urinary retention, diarrhea, constipation. She is in a soft C-collar d/t significant spinal lesions that have caused pathologic fractures.       Past Medical History:        Diagnosis Date    Atrial fibrillation with rapid ventricular response (HCC) 07/12/2024    Breast cancer (HCC)     2013 Left Invasive Ductal    Cancer (HCC) 2013    Breast LEFT    History of therapeutic radiation     completed 2014    Hx antineoplastic chemo     pre op chemo 2014    Nausea & vomiting 07/12/2024     Past Surgical History:        Procedure Laterality Date    BREAST LUMPECTOMY Left     2014    BUNIONECTOMY      CARPAL TUNNEL RELEASE Right 11/28/2016    COLONOSCOPY  12/02/2016    CT NEEDLE BIOPSY LUNG PERCUTANEOUS  7/24/2024    CT NEEDLE BIOPSY LUNG 
Comprehensive Nutrition Assessment    Type and Reason for Visit: Initial, Consult (Poor PO intakes & poor appetite)    Nutrition Recommendations/Plan:   Continue diet as ordered.   Initiate chocolate Ensure Plus TID.   Consider MVI.      Malnutrition Assessment:  Malnutrition Status: Moderate malnutrition  Context: Acute Illness       Findings of the 6 clinical characteristics of malnutrition:  Energy Intake:  50% or less of estimated energy requirements for 5 or more days  Weight Loss:  Unable to assess (10.6% (16 lb) weight loss shown in EMR but they are stated weights and noted ascites with recent paracentesis)     Body Fat Loss:  Mild body fat loss Orbital   Muscle Mass Loss:  Mild muscle mass loss Temples (temporalis), Clavicles (pectoralis & deltoids), Hand (interosseous)  Fluid Accumulation:  Mild (ascites/ paracentesis)     Strength:  Not Performed    Nutrition Assessment:    Pt. moderately malnourished AEB criteria as listed above. At risk for further nutrition compromise r/t admitted d/t abdominal pain/ nausea/ vomiting. Noted patient had a paracentesis on 7/13 with 1.36 L removed during her admission at [facility] from 7/12-7/18/24. She was also found to have suspected malignant ascites/ omental carcinomatosis. Recent OP PET scan showed activity in lung and bone. Patient is in a C-collar d/t significant spinal lesions that have caused fractures. . Noted underlying medical condition (PMHx Afib with RVR, hx breast cancer in 2013 with lumpectomy and chemo/ radiation in 2014).    Nutrition Related Findings:    Pt. Report/Treatments/Miscellaneous:   7/23/24: Patient seen, sitting up in chair with C-collar on, son in room. Per patient her appetite has been decreased since February. She estimates that she has lost around 20 lb in the past few months. Patient confirms nausea and vomiting. Per son she often has an episode of vomiting when taking her morning pills. Patient reports that she has been eating 3 meals per 
[facility]    [address] [id number]  [phone]     Hospital Consult  7/25/2024 10:10 AM    Pt Name:    Kimberlee Alvarado  MRN:     [id number]   [id number]  YOB: 1950  Admit Date:    7/22/2024  8:40 PM  Primary Care Physician:  Jim Luz APRN - CNP        Reason for Consult:  BENJI    History:   The patient is a 74 y.o. with PMHx Afib, breast cancer s/p lumpectomy, HTN, HDL who presented to [facility] with abdominal pain, nausea and vomiting. Patient was admitted for nausea and vomiting from 7/12-7/18 and was found to have malignant ascites/ omental carcinomatosis. Patients Cr is 2.3 with a baseline of 1.0 and her sodium has been running low at 128. Patient  is alert and oriented x3 denies headache, chest pain, SOB, swelling, urinary symptoms, nausea, and abdominal pain. She is feeling very tired this morning and was sleeping in bed.          Past Medical History:  Past Medical History:   Diagnosis Date    Atrial fibrillation with rapid ventricular response (HCC) 07/12/2024    Breast cancer (HCC)     2013 Left Invasive Ductal    Cancer (HCC) 2013    Breast LEFT    History of therapeutic radiation     completed 2014    Hx antineoplastic chemo     pre op chemo 2014    Nausea & vomiting 07/12/2024       Past Surgical History:  Past Surgical History:   Procedure Laterality Date    BREAST LUMPECTOMY Left     2014    BUNIONECTOMY      CARPAL TUNNEL RELEASE Right 11/28/2016    COLONOSCOPY  12/02/2016    CT NEEDLE BIOPSY LUNG PERCUTANEOUS  7/24/2024    CT NEEDLE BIOPSY LUNG PERCUTANEOUS 7/24/2024 [facility] CT SCAN    MS BX OF BREAST, NEEDLE CORE, IMAGE GUIDE Left     2013 [facility]    MS BX OF BREAST, NEEDLE CORE, IMAGE GUIDE Left     2013 benign    MS BX OF BREAST, NEEDLE CORE, IMAGE GUIDE Left     2012 benign    ROTATOR CUFF REPAIR Right 08/03/2017        TONSILLECTOMY AND ADENOIDECTOMY Bilateral        Family History:  Family History   Problem Relation Age of Onset 
therapy.    Thank you for the consult.          MARK LLAMAS MD      D:  07/27/2024 12:08:52     T:  07/27/2024 13:24:20     KARIE/DAVID  Job #:  [id number]     Doc#:  [id number]     
C1-2 instability.     Plan:  1) Aspen C-Collar at all times for 6 weeks   2) No acute surgical intervention given significant metastatic disease may consider if she would be a candidate for palliative radiation treatment pending lung biopsy results     Jayna Wise MD    
effective at controlling symptoms  There must be at least one hour between giving oral immediate release opioids and the next dose of either IV or oral immediate release opioid  Patient will continue to follow with the palliative care clinic on discharge  Will refer to Palliative Care Clinic on discharge  Palliative Care will continue to have goals of care discussions with the patient and/or family  Palliative Care will continue to follow the patient while they are hospitalized   Please PerfectServe or call the Palliative Care team with any questions or concerns    CURRENT CODE STATUS:  Full Code No additional code details         Parts of this note may have been dictated by use of voice recognition software and electronically transcribed. The note may contain errors not detected in proofreading    Electronically signed by Nikko Garcia MD on 7/24/2024 at 5:09 PM           Palliative Care Office: [phone]

## 2024-07-27 NOTE — PROCEDURES
PROCEDURE NOTE  Date: 7/27/2024   Name: Kimberlee Alvarado  YOB: 1950    Procedures        EKG was completed and handed to Bennie MORIN

## 2024-07-28 VITALS
TEMPERATURE: 97.4 F | BODY MASS INDEX: 25.14 KG/M2 | WEIGHT: 147.27 LBS | OXYGEN SATURATION: 95 % | SYSTOLIC BLOOD PRESSURE: 88 MMHG | DIASTOLIC BLOOD PRESSURE: 62 MMHG | HEIGHT: 64 IN | HEART RATE: 107 BPM | RESPIRATION RATE: 26 BRPM

## 2024-07-28 LAB
ANION GAP SERPL CALC-SCNC: 18 MEQ/L (ref 8–16)
APTT PPP: 24.3 SECONDS (ref 22–38)
ARTERIAL PATENCY WRIST A: ABNORMAL
BACTERIA SPEC ANAEROBE CULT: NORMAL
BACTERIA SPEC BFLD CULT: NORMAL
BASE EXCESS BLDA CALC-SCNC: -3.2 MMOL/L (ref -2.5–2.5)
BDY SITE: ABNORMAL
BUN SERPL-MCNC: 45 MG/DL (ref 7–22)
CALCIUM SERPL-MCNC: 8.6 MG/DL (ref 8.5–10.5)
CHLORIDE SERPL-SCNC: 100 MEQ/L (ref 98–111)
CO2 SERPL-SCNC: 16 MEQ/L (ref 23–33)
COLLECTED BY:: ABNORMAL
CREAT SERPL-MCNC: 1.5 MG/DL (ref 0.4–1.2)
DEPRECATED RDW RBC AUTO: 52.3 FL (ref 35–45)
DEVICE: ABNORMAL
EKG ATRIAL RATE: 100 BPM
EKG P AXIS: 69 DEGREES
EKG P-R INTERVAL: 148 MS
EKG Q-T INTERVAL: 314 MS
EKG Q-T INTERVAL: 368 MS
EKG QRS DURATION: 120 MS
EKG QRS DURATION: 122 MS
EKG QTC CALCULATION (BAZETT): 474 MS
EKG QTC CALCULATION (BAZETT): 491 MS
EKG R AXIS: 36 DEGREES
EKG R AXIS: 69 DEGREES
EKG T AXIS: 69 DEGREES
EKG T AXIS: 80 DEGREES
EKG VENTRICULAR RATE: 100 BPM
EKG VENTRICULAR RATE: 147 BPM
ERYTHROCYTE [DISTWIDTH] IN BLOOD BY AUTOMATED COUNT: 16 % (ref 11.5–14.5)
FIO2 ON VENT O2 ANALYZER: 4 %
GFR SERPL CREATININE-BSD FRML MDRD: 37 ML/MIN/1.73M2
GLUCOSE SERPL-MCNC: 166 MG/DL (ref 70–108)
GRAM STN SPEC: NORMAL
HCO3 BLDA-SCNC: 20 MMOL/L (ref 23–28)
HCT VFR BLD AUTO: 25.4 % (ref 37–47)
HEPARIN UNFRACTIONATED: 0.07 U/ML (ref 0.3–0.7)
HGB BLD-MCNC: 7.9 GM/DL (ref 12–16)
INR PPP: 1.3 (ref 0.85–1.13)
MCH RBC QN AUTO: 28.7 PG (ref 26–33)
MCHC RBC AUTO-ENTMCNC: 31.1 GM/DL (ref 32.2–35.5)
MCV RBC AUTO: 92.4 FL (ref 81–99)
NT-PROBNP SERPL IA-MCNC: 1857 PG/ML (ref 0–124)
PCO2 BLDA: 27 MMHG (ref 35–45)
PH BLDA: 7.47 [PH] (ref 7.35–7.45)
PH BLDV: 7.39 [PH] (ref 7.31–7.41)
PLATELET # BLD AUTO: 292 THOU/MM3 (ref 130–400)
PMV BLD AUTO: 9.7 FL (ref 9.4–12.4)
PO2 BLDA: 68 MMHG (ref 71–104)
POTASSIUM SERPL-SCNC: 4.7 MEQ/L (ref 3.5–5.2)
RBC # BLD AUTO: 2.75 MILL/MM3 (ref 4.2–5.4)
SAO2 % BLDA: 95 %
SODIUM SERPL-SCNC: 134 MEQ/L (ref 135–145)
VENTILATION MODE VENT: ABNORMAL
WBC # BLD AUTO: 14.7 THOU/MM3 (ref 4.8–10.8)

## 2024-07-28 PROCEDURE — 82803 BLOOD GASES ANY COMBINATION: CPT

## 2024-07-28 PROCEDURE — 83880 ASSAY OF NATRIURETIC PEPTIDE: CPT

## 2024-07-28 PROCEDURE — 2500000003 HC RX 250 WO HCPCS

## 2024-07-28 PROCEDURE — 37799 UNLISTED PX VASCULAR SURGERY: CPT

## 2024-07-28 PROCEDURE — 85027 COMPLETE CBC AUTOMATED: CPT

## 2024-07-28 PROCEDURE — 99233 SBSQ HOSP IP/OBS HIGH 50: CPT | Performed by: PHYSICIAN ASSISTANT

## 2024-07-28 PROCEDURE — 80048 BASIC METABOLIC PNL TOTAL CA: CPT

## 2024-07-28 PROCEDURE — 94761 N-INVAS EAR/PLS OXIMETRY MLT: CPT

## 2024-07-28 PROCEDURE — 6360000002 HC RX W HCPCS: Performed by: STUDENT IN AN ORGANIZED HEALTH CARE EDUCATION/TRAINING PROGRAM

## 2024-07-28 PROCEDURE — 36415 COLL VENOUS BLD VENIPUNCTURE: CPT

## 2024-07-28 PROCEDURE — 93010 ELECTROCARDIOGRAM REPORT: CPT | Performed by: INTERNAL MEDICINE

## 2024-07-28 PROCEDURE — 82800 BLOOD PH: CPT

## 2024-07-28 PROCEDURE — 2580000003 HC RX 258

## 2024-07-28 PROCEDURE — 85730 THROMBOPLASTIN TIME PARTIAL: CPT

## 2024-07-28 PROCEDURE — 85610 PROTHROMBIN TIME: CPT

## 2024-07-28 PROCEDURE — 93005 ELECTROCARDIOGRAM TRACING: CPT

## 2024-07-28 PROCEDURE — 6360000002 HC RX W HCPCS

## 2024-07-28 PROCEDURE — 2700000000 HC OXYGEN THERAPY PER DAY

## 2024-07-28 PROCEDURE — 85520 HEPARIN ASSAY: CPT

## 2024-07-28 PROCEDURE — 6360000002 HC RX W HCPCS: Performed by: PHYSICIAN ASSISTANT

## 2024-07-28 PROCEDURE — 6370000000 HC RX 637 (ALT 250 FOR IP): Performed by: UROLOGY

## 2024-07-28 PROCEDURE — 99232 SBSQ HOSP IP/OBS MODERATE 35: CPT | Performed by: INTERNAL MEDICINE

## 2024-07-28 RX ORDER — FENTANYL CITRATE 50 UG/ML
50 INJECTION, SOLUTION INTRAMUSCULAR; INTRAVENOUS
Status: DISCONTINUED | OUTPATIENT
Start: 2024-07-28 | End: 2024-07-28 | Stop reason: HOSPADM

## 2024-07-28 RX ORDER — HALOPERIDOL 5 MG/ML
2 INJECTION INTRAMUSCULAR
Status: DISCONTINUED | OUTPATIENT
Start: 2024-07-28 | End: 2024-07-28 | Stop reason: HOSPADM

## 2024-07-28 RX ORDER — HEPARIN SODIUM 1000 [USP'U]/ML
80 INJECTION, SOLUTION INTRAVENOUS; SUBCUTANEOUS ONCE
Status: DISCONTINUED | OUTPATIENT
Start: 2024-07-28 | End: 2024-07-28

## 2024-07-28 RX ORDER — MORPHINE SULFATE 2 MG/ML
1 INJECTION, SOLUTION INTRAMUSCULAR; INTRAVENOUS EVERY 4 HOURS PRN
Status: DISCONTINUED | OUTPATIENT
Start: 2024-07-28 | End: 2024-07-28

## 2024-07-28 RX ORDER — HEPARIN SODIUM 1000 [USP'U]/ML
40 INJECTION, SOLUTION INTRAVENOUS; SUBCUTANEOUS PRN
Status: DISCONTINUED | OUTPATIENT
Start: 2024-07-28 | End: 2024-07-28

## 2024-07-28 RX ORDER — PROCHLORPERAZINE EDISYLATE 5 MG/ML
10 INJECTION INTRAMUSCULAR; INTRAVENOUS ONCE
Status: COMPLETED | OUTPATIENT
Start: 2024-07-28 | End: 2024-07-28

## 2024-07-28 RX ORDER — HEPARIN SODIUM 1000 [USP'U]/ML
80 INJECTION, SOLUTION INTRAVENOUS; SUBCUTANEOUS PRN
Status: DISCONTINUED | OUTPATIENT
Start: 2024-07-28 | End: 2024-07-28

## 2024-07-28 RX ORDER — LORAZEPAM 2 MG/ML
0.5 INJECTION INTRAMUSCULAR EVERY 6 HOURS PRN
Status: DISCONTINUED | OUTPATIENT
Start: 2024-07-28 | End: 2024-07-28

## 2024-07-28 RX ORDER — HEPARIN SODIUM 10000 [USP'U]/100ML
5-30 INJECTION, SOLUTION INTRAVENOUS CONTINUOUS
Status: DISCONTINUED | OUTPATIENT
Start: 2024-07-28 | End: 2024-07-28

## 2024-07-28 RX ORDER — LORAZEPAM 2 MG/ML
1 INJECTION INTRAMUSCULAR EVERY 6 HOURS PRN
Status: DISCONTINUED | OUTPATIENT
Start: 2024-07-28 | End: 2024-07-28

## 2024-07-28 RX ADMIN — ONDANSETRON 4 MG: 2 INJECTION INTRAMUSCULAR; INTRAVENOUS at 00:22

## 2024-07-28 RX ADMIN — PROCHLORPERAZINE EDISYLATE 10 MG: 5 INJECTION INTRAMUSCULAR; INTRAVENOUS at 02:16

## 2024-07-28 RX ADMIN — DILTIAZEM HYDROCHLORIDE 5 MG/HR: 5 INJECTION, SOLUTION INTRAVENOUS at 01:34

## 2024-07-28 RX ADMIN — LORAZEPAM 1 MG: 2 INJECTION INTRAMUSCULAR; INTRAVENOUS at 10:07

## 2024-07-28 RX ADMIN — OXYBUTYNIN CHLORIDE 5 MG: 5 TABLET ORAL at 00:29

## 2024-07-28 RX ADMIN — HALOPERIDOL LACTATE 2 MG: 5 INJECTION, SOLUTION INTRAMUSCULAR at 11:19

## 2024-07-28 RX ADMIN — LORAZEPAM 0.5 MG: 2 INJECTION INTRAMUSCULAR; INTRAVENOUS at 08:02

## 2024-07-28 RX ADMIN — DILTIAZEM HYDROCHLORIDE 7.5 MG/HR: 5 INJECTION, SOLUTION INTRAVENOUS at 02:16

## 2024-07-28 RX ADMIN — FENTANYL CITRATE 25 MCG: 50 INJECTION INTRAMUSCULAR; INTRAVENOUS at 06:51

## 2024-07-28 NOTE — DISCHARGE SUMMARY
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 *      Consultations:-  [] NONE [] Cardiology  [x] Nephrology  [x] Hemo onco   [] GI   [] ID  [] Endocrine  [] Pulm    [] Neuro    [] Psych   [x] Urology  [] ENT   [] G SURGERY   []Ortho    []CV surg    [x] Palliative  [] Hospice [] Pain management   []    []TCU   [] PT/OT  OTHERS:-    Significant Diagnostic Studies:    US GUIDED PARACENTESIS    Result Date: 7/23/2024  PROCEDURE: Ultrasound-guided paracentesis. CLINICAL INFORMATION: Ascites. COMPARISON: Procedure 7/13/2024 PROCEDURE: Physician performing procedure: Dr. Simms Informed consent signed: yes- self Local Anesthetic: 2% lidocaine Specimen volume: 70 ml Catheter: 10 cm 5 Kosovan Aspirated ascites volume: 3.6 liters Aspirated ascites color: yellow Site of Puncture: RLQ Complications: none The benefits and the risk of the procedure were explained to the patient. Signed consent was obtained. Limited ultrasound exam of the abdomen showed  large  amount of ascites. The right side of the abdomen was prepped and draped using the usual sterile technique and the skin was anesthetized. A 5 Armenian one-step catheter was introduced to the peritoneal ascites. 3.6 L of fluid drained. The

## 2024-07-28 NOTE — PROCEDURES
PROCEDURE NOTE  Date: 7/28/2024   Name: Kimberlee Alvarado  YOB: 1950    Procedures    12 lead EKG completed. Results handed to RN

## 2024-07-28 NOTE — DEATH NOTES
[facility] [place]  Notice of Patient Passing      Patient Name- Kimberlee Alvarado   Acct Number- [id number]   Attending Physician- Sarita Banda PA-C    Admitted on-2024  8:40 PM     On 2024 at 1246 patient was found in 4K08 with:   Absence of vital signs.   Absence of neurological response.    Confirmed time of death at 1246.   Physician or On-call Physician notified of time of death- yes    Family present at time of death- yes, multiple   Spiritual care present at time of death- no    Physician was notified and orders were obtained to release the body.   Post-Mortem documentation completed; form printed, signed, and given to admitting.    Afshan Foley RN RN Nursing Supervisor/ Manager  24   12:59 PM    ________________________________________________________________________  Name of  Home: [facility]   Home Phone Number: [phone]    Who Will Sign Death Certificate:     [x] Dr. Yancey

## 2024-07-28 NOTE — PROCEDURES
PROCEDURE NOTE  Date: 7/27/2024   Name: Kimberlee Alvarado  YOB: 1950    Procedures    12 lead EKG completed. Results handed to Bentley MORIN

## 2024-07-28 NOTE — SIGNIFICANT EVENT
Patient is very drowsy with episodes of high anxiety thrashing in bed and pulling at lines. Patient opens eyes and verbalizes with repeated stimuli but is very difficult to understand. Vital signs stable on diltiazem gtt. Per family these symptoms have been progressively worsening since last night around 9-10pm. Family very concerned with patient condition. Notified provider. STAT ABG ordered.

## 2024-07-28 NOTE — PROGRESS NOTES
Physician Progress Note        Patient:   Kimberlee Alvarado  YOB: 1950  Age:  74 y.o.  Room:  [facility]15/015-  MRN:  [id number]   Acct: [id number]  PCP: Jim Luz APRN - CNP    Date of Admission:  7/22/2024  8:40 PM  Date of Service:  7/26/2024    Reason for Consult: Goals of Care and Symptom Management.    History Obtained From:-  Patient, Electronic Medical Record, Patient's primary nurse             Subjective   Kimberlee Alvarado was seen in their room today for follow up with the palliative care team. There was family present at the bedside. Patient is complaining of pain, shortness of breath, fatigue, drowsiness, constipation, and anxiety. Patient denies nausea and vomiting. They have Senna 1 tablet nightly for scheduled symptom relief. From 8 AM yesterday to 8 AM today, they have used Oxycodone IR 5 mg once and Zofran 4 mg 3 times for as needed symptom relief.  Their comfort medications are working well to control their symptoms.  They did get a good night sleep last night. The patient is eating or receiving tube feeds. They have not had a bowel movement in the last 24 hours documented.     Objective   Vitals:-    BP (!) 119/46   Pulse 89   Temp 97.6 °F (36.4 °C) (Oral)   Resp 18   Ht 1.626 m (5' 4\")   Wt 62.4 kg (137 lb 8 oz)   SpO2 93%   BMI 23.60 kg/m²     Physical Exam  Vitals and nursing note reviewed.   Constitutional:       General: She is awake. She is not in acute distress.     Appearance: She is ill-appearing.      Interventions: Cervical collar in place.   HENT:      Head: Normocephalic and atraumatic.      Right Ear: External ear normal.      Left Ear: External ear normal.      Nose: No rhinorrhea.   Eyes:      General:         Right eye: No discharge.         Left eye: No discharge.   Pulmonary:      Effort: Pulmonary effort is normal. No respiratory distress.   Musculoskeletal:      Cervical back: Neck supple.   Neurological:      Mental 
    Antony Johnson, MARGARET - CNP  Urology Progress Note    Subjective: Kimberlee Alvarado is a 74 y.o. female.    s/p * Surgery not found * on     His/Her current Diet is: ADULT DIET; Clear Liquid; 5 carb choices (75 gm/meal); Low Sodium (2 gm).    Since the previous note, the patient reports the following:  No acute issues overnight.  No fevers or chills.  No nausea or vomiting.  No chest pain or shortness of breath.  No calf pain.  Pain Controlled.  Tolerating PO Diet.    Feels better  R neph draining concentrated urine  Rodriguez + yellow urine    Ok for nephrostogram next week, can remove R pcnt if stent draining  OV to establish care and discuss chronic stent    R hydronephrosis - possible distal ureteral obstruction, plan for IR to place neph tube +stent  BENJI - CRT trending down, neph on board  Recurrent ascites - may be causing extrinsic pressure on ureters resulting in hydro    Will follow  Reviewed with Dr Tee    Vitals and Labs:  Patient Vitals for the past 24 hrs:   BP Temp Temp src Pulse Resp SpO2   07/27/24 1051 (!) 142/56 97.6 °F (36.4 °C) Axillary (!) 106 20 96 %   07/27/24 0855 -- -- -- -- -- 95 %   07/27/24 0815 110/66 97.6 °F (36.4 °C) Oral (!) 110 20 95 %   07/27/24 0438 -- -- -- -- 18 --   07/27/24 0356 (!) 126/57 98.2 °F (36.8 °C) Oral (!) 105 18 92 %   07/27/24 0038 -- -- -- -- 18 --   07/27/24 0015 122/66 98.5 °F (36.9 °C) Tympanic (!) 112 -- 91 %   07/26/24 2042 (!) 132/53 98.4 °F (36.9 °C) Oral (!) 112 18 90 %   07/26/24 1730 (!) 130/56 98.6 °F (37 °C) Oral 96 18 93 %   07/26/24 1630 (!) 121/51 98.4 °F (36.9 °C) Oral 95 16 93 %   07/26/24 1600 (!) 133/51 98.6 °F (37 °C) Oral 95 15 91 %   07/26/24 1530 (!) 118/50 98.5 °F (36.9 °C) Oral 79 19 95 %   07/26/24 1515 (!) 132/53 98.4 °F (36.9 °C) Oral 91 17 94 %   07/26/24 1500 (!) 134/51 98.3 °F (36.8 °C) Oral 93 19 95 %   07/26/24 1448 -- -- -- -- 18 --   07/26/24 1445 (!) 133/47 98.6 °F (37 °C) Oral 89 18 93 %   07/26/24 1430 132/63 98.7 °F (37.1 °C) 
    Hospitalist Progress Note      Patient:  Kimberlee Alvarado 74 y.o. female       : 1950  Unit/Bed:-15/015-A    Date of Admission: 2024      ASSESSMENT AND PLAN    Active Problems  Recurrent ascites  Likely malignant - Cytology from ascites fluid  showed rare atypical cells, suspicious for malignancy.  S/p paracentesis  with 3.6L clear fluid removed.  Low PMN and TNC count, SBP ruled out. Stop ceftriaxone, flagyl.   Metastatic malignancy of unknown primary  5cm RLL pulmonary mass on CT Chest  PET 24 shows activity in lung and bone. Mammogram 2024 negative.   Follows with Dr. Escalante.   Plan for IR guided lung biopsy today ().   Follow pathology  Palliative care following.   BENJI   CT reports persistent right hydronephrosis and hydroureter; no renal or ureteral stones.   Will check renal US to further evaluate.   Check urine lytes.   Pt received IVF bolus, has been on IVF overnight with worsening Cr. Appears more volume overloaded, will give dose IV Bumex and repeat BMP in AM.   If no improvement, consider Nephrology consult.   Acute hypoxic resp failure  CT Chest shows small b/l effusions, L>R with adjacent airspace disease. Patchy small ground-glass opacities RML, b/l lower lobes. Moderate emphysema.   Will diuresis as above. No clinical signs of PNA.   Encourage I-S, acapella. Wean O2 as tolerated.   B/l Pleural effusions  Likely related to ascites/malignancy.   Last Echo is from . BNP elevated. Will check Echo to r/o CHF as etiology.   Acute on chronic Hyponatremia  Pt was on hypotonic fluids prior to biopsy, dc'ed.   Resume home salt tabs.   BMP in AM.   Leukocytosis  Chronic. No signs of infection. SBP ruled out. Monitor CBC daily.   C1 fracture vs lesion  Noted on PET scan . Pt states she has been wearing a soft c-collar per Oncology since. Has not been seen by Ortho.   Consult OrthoSpine, discussed with Dr. Dorantes.   XR and CT C-Spine   Goals of care  Full code. Plan to 
    Hospitalist Progress Note      Patient:  Kimberlee Alvarado 74 y.o. female       : 1950  Unit/Bed:4K-008-A    Date of Admission: 2024      ASSESSMENT AND PLAN    Active Problems  Goals of care / Code status  Pt declining rapidly. Discussed further workup and treatment with family at length. Decision was made for comfort only. Palliative was contacted. Code status changed to DNR-CC.   Recurrent malignant ascites  Cytology shows rare atypical cells, suspicious for malignancy; consistent with prior from .   S/p paracentesis  with 3.6L clear fluid removed.  Low PMN and TNC count, SBP ruled out. Stop ceftriaxone, flagyl.   Ascites reoccurring, ordered for another therapeutic paracentesis , not yet completed.   Metastatic mucinous adenocarcinoma of lung   5cm RLL pulmonary mass on CT Chest  PET 24 shows activity in lung and bone. Mammogram 2024 negative.   MRI spine shows abnormal signal intensities in C1, C5, T2, T3, T4 which are new since previous 3/25/2024.  Consistent with metastatic disease.  Follows with Dr. Escalante.   S/p IR guided lung biopsy   Pathology shows moderately differentiated adenocarcinoma with mucinous differentiation.  Oncology seen, discussed further treatment with family. Will follow up further in office.   Palliative care following  Will trial Megace for appetite stimulant.   BENJI   CT reports persistent right hydronephrosis and hydroureter; no renal or ureteral stones.   UA unremarkable. FENa consistent with pre-renal.  Pt received IVF bolus, followed by continuous IVF overnight on admission without improvement. Was given IV Bumex 1mg x1  as clinically appeared more volume overloaded. Cr remains the same, 2.3.   Renal ultrasound  showed right-sided hydronephrosis.  Nephrology following -   Suspect BENJI related to poor p.o. intake versus possible ATN.  With post-renal component. See Hydronephrosis below.   Anasarca suspected to be related to metastatic 
1051 Pt in specials radiology for right lung mass biopsy. Discussed procedure with pt and pt verbalizes understanding.  1100 Dr Simms to speak to pt. Consent signed.  1110 Pt attached to monitor and positioned on table on left side. Pre biopsy scans taken.  1117 Dr Simms to review images.  1120 Dr Simms to start procedure.  1128 Pathologist present. First core biopsy sample obtained. Pt tolerating well.  1133 Additional samples obtained.  1135 Total of 6 core biopsy samples obtained. Pt tolerated well.  1136 Biopsy complete. Needle removed and post images taken.  1139 Triple antibiotic ointment applied to biopsy site on right lower back with band-aid. Site without redness, swelling or hematoma. Pt c/o slight right back discomfort with slight shortness of breath  1141 Pt positioned on cart for comfort.  1143 Pt transferred to  per cart. Report to Monico MORIN.  
1330 Patient received in IR for RIGHT nephrostomy tube and stent insertion. Pt with Ccollar in place and on NC oxygen at 2LPM.   1333 This procedure has been fully reviewed with the patient and written informed consent has been obtained.  1401 Procedure started with Dr. Zacarias.  1420 6fr x 24cm ureteral stent placed with 8fr nephrostomy tube insertion.   1420 Procedure completed; patient tolerated well. Dressing to right flank; no bleeding noted.  1427 Patient on bed; comfort ensured.  1430 Patient taken to 8A via bed. Pt alert and oriented x3; follows commands. Skin pink, warm, and dry. Respirations easy, regular, and nonlabored.  Ccollar remains in place and NC oxygen at 2LPM.  
Comprehensive Nutrition Assessment    Type and Reason for Visit:  Reassess    Nutrition Recommendations/Plan:   Consider MVI when able to tolerate  Recommend resume diet post-procedure as appropriate   Will initiate Chocolate Ensure Plus TID - when diet full liquids or greater      Malnutrition Assessment:  Malnutrition Status:  Moderate malnutrition (07/23/24 0954)    Context:  Acute Illness     Findings of the 6 clinical characteristics of malnutrition:  Energy Intake:  50% or less of estimated energy requirements for 5 or more days  Weight Loss:  Unable to assess (10.6% (16 lb) weight loss shown in EMR but they are stated weights and noted ascites with recent paracentesis)     Body Fat Loss:  Mild body fat loss Orbital   Muscle Mass Loss:  Mild muscle mass loss Temples (temporalis), Clavicles (pectoralis & deltoids), Hand (interosseous)  Fluid Accumulation:  Mild (ascites/ paracentesis)     Strength:  Not Performed    Nutrition Assessment:     Pt. moderately malnourished AEB criteria as listed above. At risk for further nutrition compromise r/t admitted d/t abdominal pain/ nausea/ vomiting, recurrent ascites. Noted patient had a paracentesis on 7/13 with 1.36 L removed during her admission at [facility] from 7/12-7/18/24, paracentesis 7/23 with 3.6 L removed; Recent OP PET scan showed activity in lung and bone. Reports of BENJI, right hydronephrosis, patient is in a C-collar d/t significant spinal lesions that have caused fractures. Noted underlying medical condition (PMHx Afib with RVR, hx breast cancer in 2013 with lumpectomy and chemo/ radiation in 2014).     Nutrition Related Findings:    Pt. Report/Treatments/Miscellaneous: Patient seen earlier today with family, reports ongoing poor appetite/ intake as continues to struggle with nausea/ vomiting and abdominal discomfort, reports sips on Ensure Plus and agreeable to have with each meal, RN reports Urology changed diet to NPO this morning, note plan for IR to 
[facility]  INPATIENT PHYSICAL THERAPY  EVALUATION  STRZ MED SURG 8AB - 8A-15/015-A    Time In: 0846  Time Out: 0901  Timed Code Treatment Minutes: 8 Minutes  Minutes: 15          Date: 2024  Patient Name: Kimberlee Alvarado,  Gender:  female        MRN: [id number]  : 1950  (74 y.o.)      Referring Practitioner: Dr. JOLIE Roa  Diagnosis: spontaneous bacterial peritonitis  Additional Pertinent Hx: H&P:74 y.o. female with PMHx of Afib, breast cancer s/p lumpectomy, HTN, HLD who presents to [facility] with abdominal pain, N/V. 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 She had a recent OP PET scan which showed significant activity in her lung and bone. She was scheduled for a lung bx but she was unable to proceed d/t feeling too weak/unwell. Since discharge, she has been having abdominal pain that progressively worsened over the past day, and multiple episodes of vomiting \"coffee-ground\" emesis today. Pt also reports lightheadedness, weakness, poor appetite/PO intake, abdominal \"tightness\". She denies fevers, chills, CP, SOB, dysuria, urinary retention, diarrhea, constipation. She is in a soft C-collar d/t significant spinal lesions that have caused pathologic fractures.      Given patient's decline in health since recent discharge, as well as the recurrent ascites c/f SBP, discussed code status at length with pt and daughter at bedside. Pt and daughter understandably tearful. Discussed that with the new likely bone involvement, chest compressions would undoubtedly be painful and there would be a high likelihood of fracturing ribs. Patient requested to think about it and talk it over with her daughter some more. Agreed with plan to leave patient as FULL CODE for now, and will rediscuss at a later time.     Restrictions/Precautions:  Restrictions/Precautions: General Precautions, Fall 
[facility]  OCCUPATIONAL THERAPY MISSED TREATMENT NOTE  STRZ MED SURG 8AB  8A-15/015-A      Date: 2024  Patient Name: Kimberlee Alvarado        CSN: [id number]   : 1950  (74 y.o.)  Gender: female   Referring Practitioner: Sandy Roa MD  Diagnosis: abdominal pain         REASON FOR MISSED TREATMENT: Patient Unavailable, Pt off floor for MRI per nurse. OT checked at 13:28 and 14:27 this date and pt out of room at these times.                
[facility]   Nephrology progress note  7/26/2024, 2:58 PM      Pt Name:    Kimberlee Alvarado  MRN:     [id number]     YOB: 1950  Admit Date:    7/22/2024  8:40 PM    Chief Complaint: Nephrology following for BENJI    Subjective:  Patient was seen and examined this morning.   Late entry.   Had about 850 mL urine output/past 24 hours.    Objective:  24HR INTAKE/OUTPUT:    Intake/Output Summary (Last 24 hours) at 7/26/2024 1458  Last data filed at 7/26/2024 1000  Gross per 24 hour   Intake 1250 ml   Output 500 ml   Net 750 ml         I/O last 3 completed shifts:  In: 1420 [P.O.:620; I.V.:800]  Out: 1250 [Urine:1250]  I/O this shift:  In: 50 [P.O.:50]  Out: 150 [Urine:150]   Admission weight: 61.2 kg (135 lb)  Wt Readings from Last 3 Encounters:   07/26/24 62.4 kg (137 lb 8 oz)   07/25/24 63 kg (138 lb 14.2 oz)   07/18/24 63 kg (139 lb)        Vitals :   Vitals:    07/26/24 1415 07/26/24 1420 07/26/24 1425 07/26/24 1430   BP: (!) 140/52 (!) 124/45 (!) 125/57 132/63   Pulse: 88 92 90 89   Resp: 16 12 14 16   Temp:    98.7 °F (37.1 °C)   TempSrc:    Oral   SpO2: 97% 92% 99% 93%   Weight:       Height:           Physical examination  General Appearance: alert and cooperative with exam, appears comfortable, no distress  Mouth/Throat: neck brace  Neck: No JVD  Lungs: diminished  Heart:  S1, S2 heard  GI: soft, distended  Extremities: no significant leg edema    Medications:  Infusion:    sodium chloride 75 mL/hr at 07/25/24 1723    sodium chloride      dextrose       Meds:    midazolam  1 mg IntraVENous Once    fentanNYL  50 mcg IntraVENous Once    polyethylene glycol  17 g Oral Daily    senna  1 tablet Oral Nightly    sodium chloride flush  5-40 mL IntraVENous 2 times per day    [Held by provider] enoxaparin  30 mg SubCUTAneous Daily    atorvastatin  10 mg Oral Daily    dilTIAZem  120 mg Oral Daily    pantoprazole  40 mg Oral QAM AC    sodium chloride  1 g Oral TID WC    metoprolol tartrate  
[facility]   Nephrology progress note  7/27/2024      Pt Name:    Kimberlee Alvarado  MRN:     [id number]     YOB: 1950  Admit Date:    7/22/2024  8:40 PM    Chief Complaint: Nephrology following for BENJI    Subjective:  Patient was seen and examined this morning.   Late entry.   Multiple family members in the room      Objective:  24HR INTAKE/OUTPUT:    Intake/Output Summary (Last 24 hours) at 7/27/2024 1338  Last data filed at 7/27/2024 0500  Gross per 24 hour   Intake 25 ml   Output 950 ml   Net -925 ml           I/O last 3 completed shifts:  In: 1175 [P.O.:375; I.V.:800]  Out: 1250 [Urine:1250]  No intake/output data recorded.   Admission weight: 61.2 kg (135 lb)  Wt Readings from Last 3 Encounters:   07/26/24 62.4 kg (137 lb 8 oz)   07/25/24 63 kg (138 lb 14.2 oz)   07/18/24 63 kg (139 lb)        Vitals :   Vitals:    07/27/24 0438 07/27/24 0815 07/27/24 0855 07/27/24 1051   BP:  110/66  (!) 142/56   Pulse:  (!) 110  (!) 106   Resp: 18 20  20   Temp:  97.6 °F (36.4 °C)  97.6 °F (36.4 °C)   TempSrc:  Oral  Axillary   SpO2:  95% 95% 96%   Weight:       Height:           Physical examination  General Appearance: alert and cooperative with exam, appears comfortable, no distress  Mouth/Throat: neck brace  Lungs: No labored breathing or use of accessory muscle use noted  Extremities: no significant leg edema  :.  Has a Rodriguez catheter, nephrostomy tube    Medications:  Infusion:    sodium chloride 75 mL/hr at 07/27/24 1054    sodium chloride      dextrose       Meds:    scopolamine  1 patch TransDERmal Q72H    epoetin ervin-epbx  30 Units/kg SubCUTAneous Weekly    megestrol  20 mg Oral Daily    midazolam  1 mg IntraVENous Once    fentanNYL  50 mcg IntraVENous Once    polyethylene glycol  17 g Oral Daily    senna  1 tablet Oral Nightly    sodium chloride flush  5-40 mL IntraVENous 2 times per day    [Held by provider] enoxaparin  30 mg SubCUTAneous Daily    atorvastatin  10 mg Oral 
[facility]   Nephrology progress note  7/28/2024      Pt Name:    Kimberlee Alvarado  MRN:     [id number]     YOB: 1950  Admit Date:    7/22/2024  8:40 PM    Chief Complaint: Nephrology following for BENJI    Subjective:  Patient was seen and examined this morning.   Multiple family members in the room  Events noted  Family awaiting son to arrive and they are thinking of less aggressive measures at this time      Objective:  24HR INTAKE/OUTPUT:    Intake/Output Summary (Last 24 hours) at 7/28/2024 1258  Last data filed at 7/28/2024 0905  Gross per 24 hour   Intake 43.36 ml   Output 490 ml   Net -446.64 ml           I/O last 3 completed shifts:  In: 150 [P.O.:150]  Out: 1090 [Urine:1090]  I/O this shift:  In: 43.4 [I.V.:43.4]  Out: -    Admission weight: 61.2 kg (135 lb)  Wt Readings from Last 3 Encounters:   07/28/24 66.8 kg (147 lb 4.3 oz)   07/25/24 63 kg (138 lb 14.2 oz)   07/18/24 63 kg (139 lb)        Vitals :   Vitals:    07/28/24 0845 07/28/24 0900 07/28/24 0936 07/28/24 1108   BP: 123/64 118/61  (!) 88/62   Pulse: (!) 102 (!) 104 (!) 108 (!) 107   Resp: 15  20 26   Temp:       TempSrc:       SpO2: 98%  95%    Weight:       Height:           Physical examination  General Appearance: Patient lethargic, minimally responsive, tachypneic, tachycardic  Mouth/Throat: neck brace  Lungs: Tachypneic  Extremities: no significant leg edema  :.  Has a Rodriguez catheter, nephrostomy tube    Medications:  Infusion:    sodium chloride      dextrose       Meds:    dilTIAZem  30 mg Oral 4 times per day    scopolamine  1 patch TransDERmal Q72H    epoetin ervin-epbx  30 Units/kg SubCUTAneous Weekly    megestrol  20 mg Oral Daily    midazolam  1 mg IntraVENous Once    fentanNYL  50 mcg IntraVENous Once    polyethylene glycol  17 g Oral Daily    senna  1 tablet Oral Nightly    sodium chloride flush  5-40 mL IntraVENous 2 times per day    [Held by provider] enoxaparin  30 mg SubCUTAneous Daily    
[facility]  PROGRESS NOTE    Shift date: 7/25/2024   Shift day: Thursday   Shift # 1    Room # 8A-15/015-A   Name: Kimberlee Alvarado                Buddhism:    Place of Scientology:     Referral: Routine Visit    Admit Date & Time: 7/22/2024  8:40 PM    Assessment:  Kimberlee Alvarado is a 74 y.o. female in the hospital because of abdominal pain. Upon entering the room writer observes that the family was in the room but the patient has been moved to the lab running      Intervention:  Writer introduced self and title as  Writer offered space for the family  to express feelings, needs, and concerns and provided a ministry presence. Kimberlee has several health issues, according to the family. I prayed with the family.     Outcome:  The family was thankful.     Plan:  Chaplains will remain available to offer spiritual and emotional support as needed.      Electronically signed by Chaplain Henrietta, on 7/25/2024 at 2:29 PM.  \[facility]\"" Health  [facility]  [phone]     
Order received for lung biopsy. Chart reviewed. Pt will be scheduled for biopsy on Wednesday, July 24 at 1100. Pt is to be NPO 4 hours prior to procedure. Hold blood thinners. Consent for procedure will be obtained by the IR staff upon pt's arrival to the department. These and any additional orders will be placed as SIGNED and HELD ORDERS by the radiologist later today/this evening. They will need to be released and initiated by the NIGHT SHIFT staff after midnight. Pt's nurse, Ricardo RN was notified of the above and verbalized understanding.          
Patient educated on how to use incentive spirometer. Patient verbalized understanding and demonstrated proper use. Emphasized importance and usage of device, with coughing and deep breathing every 4 hours while awake.        
Patient educated on how to use incentive spirometer. Patient verbalized understanding and demonstrated proper use. Emphasized importance and usage of device, with coughing and deep breathing every 4 hours while awake.        
[facility]  PHYSICAL THERAPY MISSED TREATMENT NOTE  STRZ MED SURG 8AB    Date: 2024  Patient Name: Kimberlee Alvarado        MRN: [id number]   : 1950  (74 y.o.)  Gender: female   Referring Practitioner: Dr. JOLIE Roa  Diagnosis: spontaneous bacterial peritonitis         REASON FOR MISSED TREATMENT:  Missed Treat.      Await clarification from ortho spine regarding MRI results at C1 level. Pt also has an active bed rest order. Will cont to follow    
Pt transported off floor to 4 with 8AB tech, Resource nurse and CNP. Daughter at bedside  
    Modified Brantley:  Premorbid Functional Status: Not Applicable  Current Functional Status:  Not Applicable    Assessment:  Assessment: jillian pastor overall de-conditioning, nausea and O2 dependent secondary to abdominal pain and has hx of cancer impacting patient's ability to complete ADLs and functional transfers as prior. patient would benefit from continued, skilled OT to progress toward PLOF, decrease caregiver burden and increase occupational performance.  Performance deficits / Impairments: Decreased functional mobility , Decreased cognition, Decreased ADL status, Decreased endurance, Decreased strength, Decreased balance, Decreased coordination  Prognosis: Fair  REQUIRES OT FOLLOW-UP: Yes  Decision Making: Medium Complexity    Treatment Initiated: Treatment and education initiated within context of evaluation.  Evaluation time included review of current medical information, gathering information related to past medical, social and functional history, completion of standardized testing, formal and informal observation of tasks, assessment of data and development of plan of care and goals.  Treatment time included skilled education and facilitation of tasks to increase safety and independence with ADL's for improved functional independence and quality of life.    Discharge Recommendations:  Continue to assess pending progress, 24 hour supervision or assist, Patient would benefit from continued therapy after discharge, Subacute/Skilled Nursing Facility    Patient Education:          Patient Education  Education Given To: Patient;Family  Education Provided: Role of Therapy;Fall Prevention Strategies;Plan of Care;Precautions;ADL Adaptive Strategies;Transfer Training  Barriers to Learning: None    Equipment Recommendations:  Equipment Needed: Yes  Mobility Devices: ADL Assistive Devices    Plan:  Times Per Week: 3-5x  Times Per Day: Once a day  Current Treatment Recommendations: Balance training, Functional 
EREN Alvarado is a 74 y.o. female with PMHx of Afib, breast cancer s/p lumpectomy, HTN, HLD who presents to [facility] with abdominal pain, N/V. 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 She had a recent OP PET scan which showed significant activity in her lung and bone. She was scheduled for a lung bx but she was unable to proceed d/t feeling too weak/unwell. Since discharge, she has been having abdominal pain that progressively worsened over the past day, and multiple episodes of vomiting \"coffee-ground\" emesis today. Pt also reports lightheadedness, weakness, poor appetite/PO intake, abdominal \"tightness\". She denies fevers, chills, CP, SOB, dysuria, urinary retention, diarrhea, constipation. She is in a soft C-collar d/t significant spinal lesions that have caused pathologic fractures.      Given patient's decline in health since recent discharge, as well as the recurrent ascites c/f SBP, discussed code status at length with pt and daughter at bedside. Pt and daughter understandably tearful. Discussed that with the new likely bone involvement, chest compressions would undoubtedly be painful and there would be a high likelihood of fracturing ribs. Patient requested to think about it and talk it over with her daughter some more. Agreed with plan to leave patient as FULL CODE for now, and will rediscuss at a later time.      ED course: Temp 97.8, RR 20, HR 86, /52, SpO2 86% on RA, placed on 3L NC w/ improvement to 92%. Na 131, bicarb 21, BUN 36, Cr 2.3, AG 18, LA 1.1, , trop 24, albumin 2.9, alk phos 219, WBC 18.2, Hgb 8.7. Pt given 1L NS bolus, as well as Zofran and Droperinol for nausea. Given 1x dose IV Morphine 4mg for abdominal pain, and started on 2g IV CTX and IV Flagyl for suspected SBP.     Medications:    Infusion Medications    sodium chloride      dextrose      Scheduled Medications    
needed?): []Yes / [x]No    Level of care: []Step Down / [x]Med-Surg  Bed Status: [x]Inpatient / []Observation  Telemetry: [x]Yes / []No  PT/OT: [x]Yes / []No    DVT Prophylaxis: [x] Lovenox / [] Heparin / [] SCDs / [] Already on Systemic Anticoagulation / [] None     Expected discharge date:  tbd  Disposition: Home     Code status: Full Code     ===================================================================    Chief Complaint: abdominal pain  Subjective (past 24 hours):   7/27: Patient reports continued poor appetite, nausea with eating.  She reports abdominal discomfort, states that her abdomen feels tight and bloated again.  Denies fever/chills, shortness of breath, chest pain.    7/26: Pt reports poor appetite. She denies f/c, sob, cp. She states she feels her abdomen is starting to get tight again. No pain. Denies neck or back pain.  Discussed pathology results at length with pt and daughter at bedside. Discussed MRI findings and the concern for fast progression of malignancy. Discussed poor prognosis, advised treatment options would be discussed with Oncology as outpatient. Family requested to discuss with Dr. Mathew, will reach out. Case discussed with urology. Palliative care and Neph following.     7/25: Pt reports pain in the back of her head. Reports diffuse body pain, rated 2/10. Pt reports generally not feeling well, nausea, decreased appetite. No f/c, sob, cp, NVD.   Discussed with [facility], will get MRI spine.   Cr 2.3. Renal US ordered, Nephrology consulted.   Echo ordered.   Biopsy results pending.     7/24: Pt seen following biopsy, tolerated procedure well. Family present at bedside. Pt reports significant mid and her abdominal pain since paracentesis yesterday.  No N/V/D.  Patient denies fever/chills, shortness of breath, chest pain.  Denies cough, congestion, urinary symptoms.  Patient case and plan of care was discussed pt and family at length.      Initial HPI 7/22/2024 per chart 
Meds: oxyCODONE, fentanNYL, sodium chloride flush, sodium chloride, ondansetron **OR** ondansetron, polyethylene glycol, acetaminophen **OR** acetaminophen, glucose, dextrose bolus **OR** dextrose bolus, glucagon (rDNA), dextrose, droPERidol    Exam:  BP (!) 119/46   Pulse 89   Temp 97.6 °F (36.4 °C) (Oral)   Resp 18   Ht 1.626 m (5' 4\")   Wt 62.4 kg (137 lb 8 oz)   SpO2 93%   BMI 23.60 kg/m²   General: No distress, appears stated age. Chronically ill appearing.   Eyes:  PERRL. Conjunctivae/corneas clear.  HENT: Head normal appearing. Nares normal. Oral mucosa moist.  Hearing intact.   Neck: Supple, with full range of motion. Trachea midline.  No gross JVD appreciated. Soft c collar in place.   Respiratory:  Normal effort. Clear to auscultation, without rales or wheezes or rhonchi.  Cardiovascular: Normal rate, regular rhythm with normal S1/S2 without murmurs.    No LE edema.   Abdomen: Soft, non-tender, non-distended with normal bowel sounds.  Musculoskeletal: No joint swelling or tenderness. Normal tone. No abnormal movements.   Skin: Warm and dry. No rashes or lesions.  Neurologic:  No focal sensory/motor deficits in the upper or lower extremities. Cranial nerves:  grossly non-focal 2-12.     Psychiatric: Alert and oriented, normal insight and thought content.   Capillary Refill: Brisk,< 3 seconds.  Peripheral Pulses: +2 palpable, equal bilaterally.       Labs/Radiology: See chart or assessment above.     Electronically signed by Sarita Banda PA-C on 7/26/2024 at 8:50 AM

## 2024-07-28 NOTE — SIGNIFICANT EVENT
Notified by nursing that patient was tachycardic with -150's. On chart review the patient had been becoming more tachycardic throughout the day with plans for CTA of chest if it persists if nephrology ok-given BENJI. An EKG was obtained confirming patient in atrial fibrillation with RVR.  The patient has a history of this.  She follows outpatient with Dr. Tijerina. Initially, I ordered 5 mg of IV Metoprolol which yielded little effect on the rate.  Decision was made to start her on Cardizem for rate control. [id number] but had previously refused anticoagulation during past hospitalizations and is currently only on ASA.      Later in the evening, notified by nursing that the patient woke up in a panic, pulling at lines and complaining of shortness of breath. There were no reports of hypoxia. The episode was brief and upon arrival, the patient had fallen back to sleep.  She is on 7.5 mg/hr of Cardizem and has converted back to sinus tachycardia with a rate of 102.  Well's score for PE calculated and found to be 4 putting her in moderate risk group with a 16.2% chance of PE.     Plan:   Transfer patient to stepdown.   Given risk factor and worsening tachycardia and new onset of SOB, will contact nephrology and if ok, will order CTA chest to r/o PE.      MARGARET Garcia - CNP     Addendum:  Dr. Adam updated concerning possible PE and decreased urine output overnight.  At this time will plan to get a STAT BMP and update once resulted and then will make decision regarding how we will proceed with imaging.

## 2024-07-28 NOTE — PLAN OF CARE
Problem: Safety - Adult  Goal: Free from fall injury  Outcome: Progressing     Problem: Discharge Planning  Goal: Discharge to home or other facility with appropriate resources  Outcome: Progressing     Problem: Pain  Goal: Verbalizes/displays adequate comfort level or baseline comfort level  Outcome: Progressing  Flowsheets (Taken 7/27/2024 2019)  Verbalizes/displays adequate comfort level or baseline comfort level: Encourage patient to monitor pain and request assistance